# Patient Record
Sex: MALE | Race: WHITE | Employment: UNEMPLOYED | URBAN - METROPOLITAN AREA
[De-identification: names, ages, dates, MRNs, and addresses within clinical notes are randomized per-mention and may not be internally consistent; named-entity substitution may affect disease eponyms.]

---

## 2018-04-23 ENCOUNTER — HOSPITAL ENCOUNTER (OUTPATIENT)
Age: 58
Discharge: HOME | End: 2018-04-25
Payer: COMMERCIAL

## 2018-04-23 LAB
ALBUMIN SERPL-MCNC: 3.9 G/DL (ref 3.5–5.2)
ALP BLD-CCNC: 137 U/L (ref 40–129)
ALT SERPL-CCNC: 42 U/L (ref 0–40)
ANION GAP SERPL CALCULATED.3IONS-SCNC: 16 MMOL/L (ref 7–16)
AST SERPL-CCNC: 31 U/L (ref 0–39)
BASOPHILS ABSOLUTE: 0.08 E9/L (ref 0–0.2)
BASOPHILS RELATIVE PERCENT: 0.8 % (ref 0–2)
BILIRUB SERPL-MCNC: <0.2 MG/DL (ref 0–1.2)
BUN BLDV-MCNC: 9 MG/DL (ref 6–20)
CALCIUM SERPL-MCNC: 9.3 MG/DL (ref 8.6–10.2)
CHLORIDE BLD-SCNC: 95 MMOL/L (ref 98–107)
CHOLESTEROL, TOTAL: 248 MG/DL (ref 0–199)
CO2: 27 MMOL/L (ref 22–29)
CREAT SERPL-MCNC: 0.9 MG/DL (ref 0.7–1.2)
EOSINOPHILS ABSOLUTE: 0.35 E9/L (ref 0.05–0.5)
EOSINOPHILS RELATIVE PERCENT: 3.5 % (ref 0–6)
GFR AFRICAN AMERICAN: >60
GFR NON-AFRICAN AMERICAN: >60 ML/MIN/1.73
GLUCOSE BLD-MCNC: 125 MG/DL (ref 74–109)
HBA1C MFR BLD: 5.9 % (ref 4.8–5.9)
HCT VFR BLD CALC: 50.2 % (ref 37–54)
HDLC SERPL-MCNC: 35 MG/DL
HEMOGLOBIN: 16.9 G/DL (ref 12.5–16.5)
IMMATURE GRANULOCYTES #: 0.04 E9/L
IMMATURE GRANULOCYTES %: 0.4 % (ref 0–5)
LDL CHOLESTEROL CALCULATED: 167 MG/DL (ref 0–99)
LYMPHOCYTES ABSOLUTE: 4.03 E9/L (ref 1.5–4)
LYMPHOCYTES RELATIVE PERCENT: 40.8 % (ref 20–42)
MCH RBC QN AUTO: 36.9 PG (ref 26–35)
MCHC RBC AUTO-ENTMCNC: 33.7 % (ref 32–34.5)
MCV RBC AUTO: 109.6 FL (ref 80–99.9)
MONOCYTES ABSOLUTE: 0.68 E9/L (ref 0.1–0.95)
MONOCYTES RELATIVE PERCENT: 6.9 % (ref 2–12)
NEUTROPHILS ABSOLUTE: 4.7 E9/L (ref 1.8–7.3)
NEUTROPHILS RELATIVE PERCENT: 47.6 % (ref 43–80)
PDW BLD-RTO: 12.5 FL (ref 11.5–15)
PLATELET # BLD: 198 E9/L (ref 130–450)
PMV BLD AUTO: 10.3 FL (ref 7–12)
POTASSIUM SERPL-SCNC: 4.1 MMOL/L (ref 3.5–5)
RBC # BLD: 4.58 E12/L (ref 3.8–5.8)
SODIUM BLD-SCNC: 138 MMOL/L (ref 132–146)
TOTAL PROTEIN: 7.2 G/DL (ref 6.4–8.3)
TRIGL SERPL-MCNC: 232 MG/DL (ref 0–149)
URIC ACID, SERUM: 8 MG/DL (ref 3.4–7)
VLDLC SERPL CALC-MCNC: 46 MG/DL
WBC # BLD: 9.9 E9/L (ref 4.5–11.5)

## 2018-04-23 PROCEDURE — 84550 ASSAY OF BLOOD/URIC ACID: CPT

## 2018-04-23 PROCEDURE — 83036 HEMOGLOBIN GLYCOSYLATED A1C: CPT

## 2018-04-23 PROCEDURE — 85025 COMPLETE CBC W/AUTO DIFF WBC: CPT

## 2018-04-23 PROCEDURE — 80061 LIPID PANEL: CPT

## 2018-04-23 PROCEDURE — 80053 COMPREHEN METABOLIC PANEL: CPT

## 2018-10-12 ENCOUNTER — APPOINTMENT (OUTPATIENT)
Dept: GENERAL RADIOLOGY | Age: 58
End: 2018-10-12
Payer: COMMERCIAL

## 2018-10-12 ENCOUNTER — HOSPITAL ENCOUNTER (EMERGENCY)
Age: 58
Discharge: HOME | End: 2018-10-12
Payer: COMMERCIAL

## 2018-10-12 VITALS
HEART RATE: 108 BPM | WEIGHT: 260 LBS | RESPIRATION RATE: 20 BRPM | SYSTOLIC BLOOD PRESSURE: 183 MMHG | OXYGEN SATURATION: 98 % | DIASTOLIC BLOOD PRESSURE: 93 MMHG | TEMPERATURE: 98.4 F

## 2018-10-12 DIAGNOSIS — J40 BRONCHITIS: Primary | ICD-10-CM

## 2018-10-12 PROCEDURE — 99213 OFFICE O/P EST LOW 20 MIN: CPT

## 2018-10-12 PROCEDURE — 71046 X-RAY EXAM CHEST 2 VIEWS: CPT

## 2018-10-12 PROCEDURE — 94640 AIRWAY INHALATION TREATMENT: CPT

## 2018-10-12 PROCEDURE — 6370000000 HC RX 637 (ALT 250 FOR IP): Performed by: PHYSICIAN ASSISTANT

## 2018-10-12 RX ORDER — IPRATROPIUM BROMIDE AND ALBUTEROL SULFATE 2.5; .5 MG/3ML; MG/3ML
1 SOLUTION RESPIRATORY (INHALATION) ONCE
Status: COMPLETED | OUTPATIENT
Start: 2018-10-12 | End: 2018-10-12

## 2018-10-12 RX ORDER — PREDNISONE 10 MG/1
40 TABLET ORAL DAILY
Qty: 20 TABLET | Refills: 0 | Status: SHIPPED | OUTPATIENT
Start: 2018-10-12 | End: 2018-10-17

## 2018-10-12 RX ORDER — DOXYCYCLINE HYCLATE 100 MG
100 TABLET ORAL 2 TIMES DAILY
Qty: 20 TABLET | Refills: 0 | Status: SHIPPED | OUTPATIENT
Start: 2018-10-12 | End: 2018-10-22

## 2018-10-12 RX ORDER — BENZONATATE 100 MG/1
100 CAPSULE ORAL 3 TIMES DAILY PRN
Qty: 21 CAPSULE | Refills: 0 | Status: SHIPPED | OUTPATIENT
Start: 2018-10-12 | End: 2018-10-19

## 2018-10-12 RX ORDER — ALBUTEROL SULFATE 90 UG/1
2 AEROSOL, METERED RESPIRATORY (INHALATION) EVERY 6 HOURS PRN
Qty: 1 INHALER | Refills: 0 | Status: ON HOLD | OUTPATIENT
Start: 2018-10-12 | End: 2021-03-25 | Stop reason: HOSPADM

## 2018-10-12 RX ADMIN — IPRATROPIUM BROMIDE AND ALBUTEROL SULFATE 1 AMPULE: .5; 3 SOLUTION RESPIRATORY (INHALATION) at 17:07

## 2018-10-12 NOTE — ED PROVIDER NOTES
tenderness. · Respiratory:   Breath sounds: Bilateral normal.  Lung sounds: diminished breath sounds- throughout and wheezing- throughout. No respiratory distress. · CV:  Regular rate and rhythm, normal heart sounds, without pathological murmurs, ectopy, gallops, or rubs. · GI:  Abdomen Soft, nontender, good bowel sounds. No firm or pulsatile mass. · Integument:  Normal turgor. Warm, dry, without visible rash. · Neurological:  Oriented. Motor functions intact. Lab / Imaging Results   (All laboratory and radiology results have been personally reviewed by myself)  Labs:  No results found for this visit on 10/12/18. Imaging: All Radiology results interpreted by Radiologist unless otherwise noted. XR CHEST STANDARD (2 VW)   Final Result   No acute cardiopulmonary disease. ED Course / Medical Decision Making     Medications   ipratropium-albuterol (DUONEB) nebulizer solution 1 ampule (1 ampule Inhalation Given 10/12/18 1707)        Re-examination:  10/12/18       Time: 8504    Patients symptoms are improving. Better air movement overall. Mild scattered wheezing. Updated on results. Consults:   None    Procedures:   none    Medical Decision Making:    No sign of pneumonia on chest x-ray today. Patient is not hypoxic, he has no respiratory distress. He is mildly tachycardic, heart rate of 108 at time of discharge. At last visit 2 years ago heart was seen at that time. He denies any chest pain or shortness of breath. The bronchitis versus COPD exacerbation as he is a heavy smoker. We'll discharge home at this time. Advised that he needs to go to the ER for any worsening symptoms. Otherwise follow-up with any PCP. Plan of Care: Normal progression of disease discussed. All questions answered. Explained the rationale for symptomatic treatment rather than use of an antibiotic.   Instruction provided in the use of fluids, vaporizer, acetaminophen, and other OTC medication for symptom

## 2019-09-03 ENCOUNTER — HOSPITAL ENCOUNTER (OUTPATIENT)
Age: 59
Discharge: HOME | End: 2019-09-05
Payer: COMMERCIAL

## 2019-09-03 LAB
ALBUMIN SERPL-MCNC: 4.2 G/DL (ref 3.5–5.2)
ALP BLD-CCNC: 132 U/L (ref 40–129)
ALT SERPL-CCNC: 30 U/L (ref 0–40)
ANION GAP SERPL CALCULATED.3IONS-SCNC: 17 MMOL/L (ref 7–16)
ANISOCYTOSIS: ABNORMAL
AST SERPL-CCNC: 40 U/L (ref 0–39)
BASOPHILS ABSOLUTE: 0 E9/L (ref 0–0.2)
BASOPHILS RELATIVE PERCENT: 0.7 % (ref 0–2)
BILIRUB SERPL-MCNC: 0.6 MG/DL (ref 0–1.2)
BUN BLDV-MCNC: 17 MG/DL (ref 6–20)
CALCIUM SERPL-MCNC: 9.5 MG/DL (ref 8.6–10.2)
CHLORIDE BLD-SCNC: 96 MMOL/L (ref 98–107)
CHOLESTEROL, TOTAL: 274 MG/DL (ref 0–199)
CO2: 24 MMOL/L (ref 22–29)
CREAT SERPL-MCNC: 0.9 MG/DL (ref 0.7–1.2)
EOSINOPHILS ABSOLUTE: 0.68 E9/L (ref 0.05–0.5)
EOSINOPHILS RELATIVE PERCENT: 6.1 % (ref 0–6)
GFR AFRICAN AMERICAN: >60
GFR NON-AFRICAN AMERICAN: >60 ML/MIN/1.73
GLUCOSE BLD-MCNC: 155 MG/DL (ref 74–99)
HBA1C MFR BLD: 6.5 % (ref 4–5.6)
HCT VFR BLD CALC: 53.1 % (ref 37–54)
HDLC SERPL-MCNC: 50 MG/DL
HEMOGLOBIN: 17.7 G/DL (ref 12.5–16.5)
LDL CHOLESTEROL CALCULATED: 187 MG/DL (ref 0–99)
LYMPHOCYTES ABSOLUTE: 4.22 E9/L (ref 1.5–4)
LYMPHOCYTES RELATIVE PERCENT: 38.3 % (ref 20–42)
MCH RBC QN AUTO: 36.7 PG (ref 26–35)
MCHC RBC AUTO-ENTMCNC: 33.3 % (ref 32–34.5)
MCV RBC AUTO: 110.2 FL (ref 80–99.9)
MICROALBUMIN UR-MCNC: 84.6 MG/L
MONOCYTES ABSOLUTE: 0.44 E9/L (ref 0.1–0.95)
MONOCYTES RELATIVE PERCENT: 4.3 % (ref 2–12)
NEUTROPHILS ABSOLUTE: 5.66 E9/L (ref 1.8–7.3)
NEUTROPHILS RELATIVE PERCENT: 51.3 % (ref 43–80)
PDW BLD-RTO: 12.7 FL (ref 11.5–15)
PLATELET # BLD: 197 E9/L (ref 130–450)
PMV BLD AUTO: 10.6 FL (ref 7–12)
POLYCHROMASIA: ABNORMAL
POTASSIUM SERPL-SCNC: 4.8 MMOL/L (ref 3.5–5)
PROSTATE SPECIFIC ANTIGEN: 0.69 NG/ML (ref 0–4)
RBC # BLD: 4.82 E12/L (ref 3.8–5.8)
SODIUM BLD-SCNC: 137 MMOL/L (ref 132–146)
TOTAL PROTEIN: 7.9 G/DL (ref 6.4–8.3)
TRIGL SERPL-MCNC: 183 MG/DL (ref 0–149)
TSH SERPL DL<=0.05 MIU/L-ACNC: 1.66 UIU/ML (ref 0.27–4.2)
VLDLC SERPL CALC-MCNC: 37 MG/DL
WBC # BLD: 11.1 E9/L (ref 4.5–11.5)

## 2019-09-03 PROCEDURE — 83036 HEMOGLOBIN GLYCOSYLATED A1C: CPT

## 2019-09-03 PROCEDURE — 85025 COMPLETE CBC W/AUTO DIFF WBC: CPT

## 2019-09-03 PROCEDURE — G0103 PSA SCREENING: HCPCS

## 2019-09-03 PROCEDURE — 84443 ASSAY THYROID STIM HORMONE: CPT

## 2019-09-03 PROCEDURE — 82044 UR ALBUMIN SEMIQUANTITATIVE: CPT

## 2019-09-03 PROCEDURE — 80053 COMPREHEN METABOLIC PANEL: CPT

## 2019-09-03 PROCEDURE — 80061 LIPID PANEL: CPT

## 2020-01-17 ENCOUNTER — HOSPITAL ENCOUNTER (EMERGENCY)
Age: 60
Discharge: HOME | End: 2020-01-17
Attending: EMERGENCY MEDICINE
Payer: COMMERCIAL

## 2020-01-17 VITALS
WEIGHT: 260 LBS | RESPIRATION RATE: 16 BRPM | SYSTOLIC BLOOD PRESSURE: 130 MMHG | HEIGHT: 71 IN | HEART RATE: 111 BPM | BODY MASS INDEX: 36.4 KG/M2 | DIASTOLIC BLOOD PRESSURE: 77 MMHG | OXYGEN SATURATION: 95 % | TEMPERATURE: 98.9 F

## 2020-01-17 PROCEDURE — 10061 I&D ABSCESS COMP/MULTIPLE: CPT

## 2020-01-17 PROCEDURE — 99283 EMERGENCY DEPT VISIT LOW MDM: CPT

## 2020-01-17 PROCEDURE — 6370000000 HC RX 637 (ALT 250 FOR IP): Performed by: STUDENT IN AN ORGANIZED HEALTH CARE EDUCATION/TRAINING PROGRAM

## 2020-01-17 RX ORDER — HYDROCODONE BITARTRATE AND ACETAMINOPHEN 5; 325 MG/1; MG/1
1 TABLET ORAL ONCE
Status: COMPLETED | OUTPATIENT
Start: 2020-01-17 | End: 2020-01-17

## 2020-01-17 RX ORDER — DOXYCYCLINE HYCLATE 100 MG
100 TABLET ORAL 2 TIMES DAILY
Qty: 20 TABLET | Refills: 0 | Status: SHIPPED | OUTPATIENT
Start: 2020-01-17 | End: 2020-01-27

## 2020-01-17 RX ADMIN — HYDROCODONE BITARTRATE AND ACETAMINOPHEN 1 TABLET: 5; 325 TABLET ORAL at 14:59

## 2020-01-17 ASSESSMENT — ENCOUNTER SYMPTOMS
COUGH: 0
VOMITING: 0
ABDOMINAL PAIN: 0
COLOR CHANGE: 0
NAUSEA: 0
SHORTNESS OF BREATH: 0
DIARRHEA: 0
WHEEZING: 0
CONSTIPATION: 0

## 2020-01-17 ASSESSMENT — PAIN SCALES - GENERAL: PAINLEVEL_OUTOF10: 10

## 2020-01-17 ASSESSMENT — PAIN DESCRIPTION - FREQUENCY: FREQUENCY: CONTINUOUS

## 2020-01-17 NOTE — ED PROVIDER NOTES
Patient is a 63-year-old male presents the ED for abscess on the back of his neck. Patient states that he has had this for quite some time, states that it progressively got worse over the last week. He states that he had some drainage and noticed that there was a spot with some mild drainage, but was told that if the cyst did not get any worse or any bigger, to just watch it until it got so bad then to have it excised at that time. Patient denies any other focal neurologic deficits, no numbness or tingling. The history is provided by the patient. No  was used. Review of Systems   Constitutional: Negative for chills and fever. Respiratory: Negative for cough, shortness of breath and wheezing. Cardiovascular: Negative for chest pain and palpitations. Gastrointestinal: Negative for abdominal pain, constipation, diarrhea, nausea and vomiting. Genitourinary: Negative for dysuria and hematuria. Musculoskeletal: Negative for neck pain and neck stiffness. Skin: Positive for wound (Posterior neck. ). Negative for color change, pallor and rash. Neurological: Negative for dizziness, syncope, light-headedness, numbness and headaches. Psychiatric/Behavioral: Negative for confusion and decreased concentration. The patient is not nervous/anxious. Physical Exam  Vitals signs and nursing note reviewed. Constitutional:       General: He is not in acute distress. Appearance: He is well-developed. He is not diaphoretic. HENT:      Head: Normocephalic and atraumatic. Right Ear: External ear normal.      Left Ear: External ear normal.      Mouth/Throat:      Pharynx: No oropharyngeal exudate. Eyes:      Pupils: Pupils are equal, round, and reactive to light. Neck:      Musculoskeletal: Normal range of motion. Comments: Large abscess on the posterior portion of the neck, confirmed with ultrasound to have some fluid in that cystic area.   Cardiovascular: Rate and Rhythm: Normal rate and regular rhythm. Heart sounds: Normal heart sounds. No murmur. No friction rub. No gallop. Pulmonary:      Effort: Pulmonary effort is normal. No respiratory distress. Breath sounds: Normal breath sounds. No wheezing or rales. Chest:      Chest wall: No tenderness. Abdominal:      General: Bowel sounds are normal. There is no distension. Palpations: Abdomen is soft. There is no mass. Tenderness: There is no tenderness. There is no guarding or rebound. Hernia: No hernia is present. Musculoskeletal: Normal range of motion. General: No tenderness or deformity. Lymphadenopathy:      Cervical: No cervical adenopathy. Skin:     General: Skin is warm and dry. Capillary Refill: Capillary refill takes less than 2 seconds. Coloration: Skin is not pale. Findings: No erythema or rash. Neurological:      Mental Status: He is alert and oriented to person, place, and time. Cranial Nerves: No cranial nerve deficit. Psychiatric:         Behavior: Behavior normal.         Thought Content: Thought content normal.         Judgment: Judgment normal.          Procedures   Incision and Drainage Procedure Note    Indication: Abscess    Procedure: The patient was positioned appropriately and the skin over the incision site was prepped with alcohol. Local anesthesia was obtained by infiltration using 3.0 cc of 1% Lidocaine without epinephrine. An incision was then made over the greatest area of fluctuance and approximately 10 cc of thick, foul smelling and sabaceous material was expressed. Loculations were broken up using a hemostat but no more material was returned. The drainage cavity was then packed with sterile gauze. The patients tetanus status was up to date and did not require a booster dose. The patient tolerated the procedure well.     Complications: None    Heather Hebert DO  Resident, PGY-3  1/17/2020  3:19 PM        --------------------------------------------- PAST HISTORY ---------------------------------------------  Past Medical History:  has a past medical history of Hyperlipidemia and Hypertension. Past Surgical History:  has no past surgical history on file. Social History:  reports that he has been smoking. He has been smoking about 0.50 packs per day. He does not have any smokeless tobacco history on file. Family History: family history is not on file. The patients home medications have been reviewed. Allergies: Patient has no known allergies. -------------------------------------------------- RESULTS -------------------------------------------------  Labs:  No results found for this visit on 01/17/20. Radiology:  No orders to display       ------------------------- NURSING NOTES AND VITALS REVIEWED ---------------------------  Date / Time Roomed:  1/17/2020  2:09 PM  ED Bed Assignment:  02/02    The nursing notes within the ED encounter and vital signs as below have been reviewed. BP (!) 158/82   Pulse 116   Temp 98.9 °F (37.2 °C)   Resp 20   Ht 5' 11\" (1.803 m)   Wt 260 lb (117.9 kg)   SpO2 96%   BMI 36.26 kg/m²   Oxygen Saturation Interpretation: Normal      ------------------------------------------ PROGRESS NOTES ------------------------------------------  ED Course as of Jan 17 1519 Fri Jan 17, 2020   1429   ATTENDING PROVIDER ATTESTATION:     I have personally performed and/or participated in the history, exam, medical decision making, and procedures and agree with all pertinent clinical information unless otherwise noted. I have also reviewed and agree with the past medical, family and social history unless otherwise noted.     I have discussed this patient in detail with the resident and provided the instruction and education regarding the evidence-based evaluation and treatment of [unfilled]  History: patient presents with sebaceous cyst that has increased

## 2020-01-21 ENCOUNTER — HOSPITAL ENCOUNTER (OUTPATIENT)
Age: 60
Discharge: HOME | End: 2020-01-23
Payer: COMMERCIAL

## 2020-01-21 LAB
ALBUMIN SERPL-MCNC: 4.1 G/DL (ref 3.5–5.2)
ALP BLD-CCNC: 160 U/L (ref 40–129)
ALT SERPL-CCNC: 21 U/L (ref 0–40)
ANION GAP SERPL CALCULATED.3IONS-SCNC: 18 MMOL/L (ref 7–16)
AST SERPL-CCNC: 19 U/L (ref 0–39)
BASOPHILS ABSOLUTE: 0.11 E9/L (ref 0–0.2)
BASOPHILS RELATIVE PERCENT: 0.8 % (ref 0–2)
BILIRUB SERPL-MCNC: 0.3 MG/DL (ref 0–1.2)
BUN BLDV-MCNC: 17 MG/DL (ref 6–20)
CALCIUM SERPL-MCNC: 9.6 MG/DL (ref 8.6–10.2)
CHLORIDE BLD-SCNC: 94 MMOL/L (ref 98–107)
CHOLESTEROL, TOTAL: 233 MG/DL (ref 0–199)
CO2: 23 MMOL/L (ref 22–29)
CREAT SERPL-MCNC: 1 MG/DL (ref 0.7–1.2)
EOSINOPHILS ABSOLUTE: 0.41 E9/L (ref 0.05–0.5)
EOSINOPHILS RELATIVE PERCENT: 2.9 % (ref 0–6)
GFR AFRICAN AMERICAN: >60
GFR NON-AFRICAN AMERICAN: >60 ML/MIN/1.73
GLUCOSE BLD-MCNC: 238 MG/DL (ref 74–99)
HBA1C MFR BLD: 7 % (ref 4–5.6)
HCT VFR BLD CALC: 52.8 % (ref 37–54)
HDLC SERPL-MCNC: 40 MG/DL
HEMOGLOBIN: 17.2 G/DL (ref 12.5–16.5)
IMMATURE GRANULOCYTES #: 0.12 E9/L
IMMATURE GRANULOCYTES %: 0.8 % (ref 0–5)
LDL CHOLESTEROL CALCULATED: 147 MG/DL (ref 0–99)
LYMPHOCYTES ABSOLUTE: 4 E9/L (ref 1.5–4)
LYMPHOCYTES RELATIVE PERCENT: 28.3 % (ref 20–42)
MCH RBC QN AUTO: 35.5 PG (ref 26–35)
MCHC RBC AUTO-ENTMCNC: 32.6 % (ref 32–34.5)
MCV RBC AUTO: 108.9 FL (ref 80–99.9)
MICROALBUMIN UR-MCNC: 35.9 MG/L
MONOCYTES ABSOLUTE: 1.02 E9/L (ref 0.1–0.95)
MONOCYTES RELATIVE PERCENT: 7.2 % (ref 2–12)
NEUTROPHILS ABSOLUTE: 8.48 E9/L (ref 1.8–7.3)
NEUTROPHILS RELATIVE PERCENT: 60 % (ref 43–80)
PDW BLD-RTO: 12.1 FL (ref 11.5–15)
PLATELET # BLD: 225 E9/L (ref 130–450)
PMV BLD AUTO: 10.7 FL (ref 7–12)
POTASSIUM SERPL-SCNC: 4.7 MMOL/L (ref 3.5–5)
RBC # BLD: 4.85 E12/L (ref 3.8–5.8)
SODIUM BLD-SCNC: 135 MMOL/L (ref 132–146)
TOTAL PROTEIN: 7.8 G/DL (ref 6.4–8.3)
TRIGL SERPL-MCNC: 228 MG/DL (ref 0–149)
VLDLC SERPL CALC-MCNC: 46 MG/DL
WBC # BLD: 14.1 E9/L (ref 4.5–11.5)

## 2020-01-21 PROCEDURE — 82044 UR ALBUMIN SEMIQUANTITATIVE: CPT

## 2020-01-21 PROCEDURE — 83036 HEMOGLOBIN GLYCOSYLATED A1C: CPT

## 2020-01-21 PROCEDURE — 85025 COMPLETE CBC W/AUTO DIFF WBC: CPT

## 2020-01-21 PROCEDURE — 80061 LIPID PANEL: CPT

## 2020-01-21 PROCEDURE — 80053 COMPREHEN METABOLIC PANEL: CPT

## 2020-01-27 ENCOUNTER — OFFICE VISIT (OUTPATIENT)
Dept: SURGERY | Age: 60
End: 2020-01-27
Payer: COMMERCIAL

## 2020-01-27 VITALS
BODY MASS INDEX: 36.4 KG/M2 | WEIGHT: 260 LBS | HEART RATE: 110 BPM | SYSTOLIC BLOOD PRESSURE: 161 MMHG | DIASTOLIC BLOOD PRESSURE: 96 MMHG | OXYGEN SATURATION: 93 % | HEIGHT: 71 IN | TEMPERATURE: 97.3 F

## 2020-01-27 PROCEDURE — 99204 OFFICE O/P NEW MOD 45 MIN: CPT | Performed by: SURGERY

## 2020-01-27 RX ORDER — OMEPRAZOLE 40 MG/1
40 CAPSULE, DELAYED RELEASE ORAL DAILY PRN
COMMUNITY
Start: 2015-04-28 | End: 2022-07-10 | Stop reason: ALTCHOICE

## 2020-01-27 RX ORDER — AMOXICILLIN AND CLAVULANATE POTASSIUM 875; 125 MG/1; MG/1
TABLET, FILM COATED ORAL
COMMUNITY
Start: 2020-01-21 | End: 2020-02-04 | Stop reason: ALTCHOICE

## 2020-01-27 RX ORDER — SITAGLIPTIN 50 MG/1
50 TABLET, FILM COATED ORAL DAILY
COMMUNITY
Start: 2020-01-21 | End: 2022-07-10 | Stop reason: ALTCHOICE

## 2020-01-27 RX ORDER — ZOLPIDEM TARTRATE 5 MG/1
TABLET ORAL
Status: ON HOLD | COMMUNITY
Start: 2018-10-23 | End: 2021-03-25 | Stop reason: HOSPADM

## 2020-01-27 RX ORDER — UMECLIDINIUM BROMIDE AND VILANTEROL TRIFENATATE 62.5; 25 UG/1; UG/1
1 POWDER RESPIRATORY (INHALATION) DAILY
COMMUNITY
Start: 2019-12-11 | End: 2022-07-10 | Stop reason: ALTCHOICE

## 2020-01-27 RX ORDER — PRAVASTATIN SODIUM 20 MG
20 TABLET ORAL DAILY
COMMUNITY
Start: 2020-01-10 | End: 2022-07-10 | Stop reason: ALTCHOICE

## 2020-01-27 RX ORDER — AMLODIPINE BESYLATE AND BENAZEPRIL HYDROCHLORIDE 10; 40 MG/1; MG/1
CAPSULE ORAL
Status: ON HOLD | COMMUNITY
Start: 2018-10-23 | End: 2021-03-25 | Stop reason: HOSPADM

## 2020-01-27 RX ORDER — METOPROLOL SUCCINATE 50 MG/1
TABLET, EXTENDED RELEASE ORAL
Status: ON HOLD | COMMUNITY
Start: 2019-09-03 | End: 2021-03-25 | Stop reason: HOSPADM

## 2020-01-27 RX ORDER — ALLOPURINOL 300 MG/1
300 TABLET ORAL DAILY
COMMUNITY
Start: 2018-04-23 | End: 2022-07-10 | Stop reason: ALTCHOICE

## 2020-01-27 SDOH — HEALTH STABILITY: MENTAL HEALTH: HOW OFTEN DO YOU HAVE A DRINK CONTAINING ALCOHOL?: 4 OR MORE TIMES A WEEK

## 2020-01-27 NOTE — PROGRESS NOTES
General Surgery History and Physical    Patient's Name/Date of Birth: Heather Cerda / 1960    Date: January 27, 2020     Surgeon: Nii Mireles M.D.    PCP: Ruth Levine MD     Chief Complaint: soft tissue neoplasm of posterior neck    HPI:   Heather Cerda is a 61 y.o. male who presents for evaluation of soft tissue neoplasm of posterior neck. It has become more painful and is enlarging. Past Medical History:   Diagnosis Date    Hyperlipidemia     Hypertension        No past surgical history on file. Current Outpatient Medications   Medication Sig Dispense Refill    allopurinol (ZYLOPRIM) 300 MG tablet Take by mouth      amLODIPine-benazepril (LOTREL) 10-40 MG per capsule Take by mouth      amoxicillin-clavulanate (AUGMENTIN) 875-125 MG per tablet Take by mouth      metFORMIN (GLUCOPHAGE) 500 MG tablet Take by mouth      metoprolol succinate (TOPROL XL) 50 MG extended release tablet Take by mouth      omeprazole (PRILOSEC) 40 MG delayed release capsule Take by mouth      JANUVIA 50 MG tablet TK 1 T PO QD      pravastatin (PRAVACHOL) 20 MG tablet TK 1 T PO QD      zolpidem (AMBIEN) 5 MG tablet       ANORO ELLIPTA 62.5-25 MCG/INH AEPB inhaler INL 1 PUFF PO D      doxycycline hyclate (VIBRA-TABS) 100 MG tablet Take 1 tablet by mouth 2 times daily for 10 days 20 tablet 0    albuterol sulfate HFA (VENTOLIN HFA) 108 (90 Base) MCG/ACT inhaler Inhale 2 puffs into the lungs every 6 hours as needed for Wheezing 1 Inhaler 0    Amlodipine-Valsartan-HCTZ (EXFORGE HCT PO) Take  by mouth.  ibuprofen (IBU) 800 MG tablet Take 1 tablet by mouth every 8 hours as needed for Pain for 10 days. 30 tablet 0    ibuprofen (IBU) 800 MG tablet Take 1 tablet by mouth every 8 hours as needed for Pain for 7 days. 21 tablet 0     No current facility-administered medications for this visit.         No Known Allergies    The patient has a family history that is negative for severe cardiovascular or

## 2020-01-28 ENCOUNTER — TELEPHONE (OUTPATIENT)
Dept: SURGERY | Age: 60
End: 2020-01-28

## 2020-01-29 ENCOUNTER — PREP FOR PROCEDURE (OUTPATIENT)
Dept: SURGERY | Age: 60
End: 2020-01-29

## 2020-01-29 RX ORDER — SODIUM CHLORIDE 0.9 % (FLUSH) 0.9 %
10 SYRINGE (ML) INJECTION PRN
Status: CANCELLED | OUTPATIENT
Start: 2020-01-29

## 2020-01-29 RX ORDER — SODIUM CHLORIDE 0.9 % (FLUSH) 0.9 %
10 SYRINGE (ML) INJECTION EVERY 12 HOURS SCHEDULED
Status: CANCELLED | OUTPATIENT
Start: 2020-01-29

## 2020-01-30 ENCOUNTER — TELEPHONE (OUTPATIENT)
Dept: SURGERY | Age: 60
End: 2020-01-30

## 2020-02-05 ENCOUNTER — HOSPITAL ENCOUNTER (OUTPATIENT)
Age: 60
Setting detail: OUTPATIENT SURGERY
Discharge: HOME | End: 2020-02-05
Attending: SURGERY | Admitting: SURGERY
Payer: COMMERCIAL

## 2020-02-05 ENCOUNTER — ANESTHESIA EVENT (OUTPATIENT)
Dept: OPERATING ROOM | Age: 60
End: 2020-02-05
Payer: COMMERCIAL

## 2020-02-05 ENCOUNTER — ANESTHESIA (OUTPATIENT)
Dept: OPERATING ROOM | Age: 60
End: 2020-02-05
Payer: COMMERCIAL

## 2020-02-05 VITALS
DIASTOLIC BLOOD PRESSURE: 79 MMHG | SYSTOLIC BLOOD PRESSURE: 122 MMHG | OXYGEN SATURATION: 94 % | RESPIRATION RATE: 18 BRPM | HEIGHT: 71 IN | BODY MASS INDEX: 38.5 KG/M2 | TEMPERATURE: 97.8 F | WEIGHT: 275 LBS | HEART RATE: 99 BPM

## 2020-02-05 VITALS
OXYGEN SATURATION: 93 % | SYSTOLIC BLOOD PRESSURE: 99 MMHG | RESPIRATION RATE: 17 BRPM | DIASTOLIC BLOOD PRESSURE: 57 MMHG

## 2020-02-05 LAB
BASOPHILS ABSOLUTE: 0.07 E9/L (ref 0–0.2)
BASOPHILS RELATIVE PERCENT: 0.4 % (ref 0–2)
EOSINOPHILS ABSOLUTE: 0.27 E9/L (ref 0.05–0.5)
EOSINOPHILS RELATIVE PERCENT: 1.7 % (ref 0–6)
HCT VFR BLD CALC: 48.9 % (ref 37–54)
HEMOGLOBIN: 17.3 G/DL (ref 12.5–16.5)
IMMATURE GRANULOCYTES #: 0.11 E9/L
IMMATURE GRANULOCYTES %: 0.7 % (ref 0–5)
LYMPHOCYTES ABSOLUTE: 4.77 E9/L (ref 1.5–4)
LYMPHOCYTES RELATIVE PERCENT: 30.1 % (ref 20–42)
MCH RBC QN AUTO: 36.6 PG (ref 26–35)
MCHC RBC AUTO-ENTMCNC: 35.4 % (ref 32–34.5)
MCV RBC AUTO: 103.4 FL (ref 80–99.9)
METER GLUCOSE: 168 MG/DL (ref 74–99)
MONOCYTES ABSOLUTE: 1.06 E9/L (ref 0.1–0.95)
MONOCYTES RELATIVE PERCENT: 6.7 % (ref 2–12)
NEUTROPHILS ABSOLUTE: 9.56 E9/L (ref 1.8–7.3)
NEUTROPHILS RELATIVE PERCENT: 60.4 % (ref 43–80)
PDW BLD-RTO: 12.2 FL (ref 11.5–15)
PLATELET # BLD: 106 E9/L (ref 130–450)
PMV BLD AUTO: 10.9 FL (ref 7–12)
RBC # BLD: 4.73 E12/L (ref 3.8–5.8)
WBC # BLD: 15.8 E9/L (ref 4.5–11.5)

## 2020-02-05 PROCEDURE — 85025 COMPLETE CBC W/AUTO DIFF WBC: CPT

## 2020-02-05 PROCEDURE — 3700000000 HC ANESTHESIA ATTENDED CARE: Performed by: SURGERY

## 2020-02-05 PROCEDURE — 36415 COLL VENOUS BLD VENIPUNCTURE: CPT

## 2020-02-05 PROCEDURE — 88304 TISSUE EXAM BY PATHOLOGIST: CPT

## 2020-02-05 PROCEDURE — 7100000010 HC PHASE II RECOVERY - FIRST 15 MIN: Performed by: SURGERY

## 2020-02-05 PROCEDURE — 2580000003 HC RX 258: Performed by: ANESTHESIOLOGY

## 2020-02-05 PROCEDURE — 6360000002 HC RX W HCPCS: Performed by: NURSE ANESTHETIST, CERTIFIED REGISTERED

## 2020-02-05 PROCEDURE — 3600000012 HC SURGERY LEVEL 2 ADDTL 15MIN: Performed by: SURGERY

## 2020-02-05 PROCEDURE — 12042 INTMD RPR N-HF/GENIT2.6-7.5: CPT | Performed by: SURGERY

## 2020-02-05 PROCEDURE — 3700000001 HC ADD 15 MINUTES (ANESTHESIA): Performed by: SURGERY

## 2020-02-05 PROCEDURE — 2580000003 HC RX 258: Performed by: SURGERY

## 2020-02-05 PROCEDURE — 2500000003 HC RX 250 WO HCPCS: Performed by: SURGERY

## 2020-02-05 PROCEDURE — 99024 POSTOP FOLLOW-UP VISIT: CPT | Performed by: SURGERY

## 2020-02-05 PROCEDURE — 2709999900 HC NON-CHARGEABLE SUPPLY: Performed by: SURGERY

## 2020-02-05 PROCEDURE — 6360000002 HC RX W HCPCS: Performed by: SURGERY

## 2020-02-05 PROCEDURE — 3600000002 HC SURGERY LEVEL 2 BASE: Performed by: SURGERY

## 2020-02-05 PROCEDURE — 11424 EXC H-F-NK-SP B9+MARG 3.1-4: CPT | Performed by: SURGERY

## 2020-02-05 PROCEDURE — 82962 GLUCOSE BLOOD TEST: CPT

## 2020-02-05 PROCEDURE — 7100000011 HC PHASE II RECOVERY - ADDTL 15 MIN: Performed by: SURGERY

## 2020-02-05 RX ORDER — CEFAZOLIN SODIUM 2 G/50ML
2 SOLUTION INTRAVENOUS
Status: COMPLETED | OUTPATIENT
Start: 2020-02-05 | End: 2020-02-05

## 2020-02-05 RX ORDER — HYDROCODONE BITARTRATE AND ACETAMINOPHEN 5; 325 MG/1; MG/1
1 TABLET ORAL EVERY 6 HOURS PRN
Qty: 5 TABLET | Refills: 0 | Status: SHIPPED | OUTPATIENT
Start: 2020-02-05 | End: 2020-02-08

## 2020-02-05 RX ORDER — BUPIVACAINE HYDROCHLORIDE AND EPINEPHRINE 2.5; 5 MG/ML; UG/ML
INJECTION, SOLUTION EPIDURAL; INFILTRATION; INTRACAUDAL; PERINEURAL PRN
Status: DISCONTINUED | OUTPATIENT
Start: 2020-02-05 | End: 2020-02-05 | Stop reason: ALTCHOICE

## 2020-02-05 RX ORDER — SODIUM CHLORIDE 9 MG/ML
INJECTION, SOLUTION INTRAVENOUS CONTINUOUS
Status: DISCONTINUED | OUTPATIENT
Start: 2020-02-05 | End: 2020-02-05 | Stop reason: HOSPADM

## 2020-02-05 RX ORDER — PROPOFOL 10 MG/ML
INJECTION, EMULSION INTRAVENOUS CONTINUOUS PRN
Status: DISCONTINUED | OUTPATIENT
Start: 2020-02-05 | End: 2020-02-05 | Stop reason: SDUPTHER

## 2020-02-05 RX ORDER — MIDAZOLAM HYDROCHLORIDE 1 MG/ML
INJECTION INTRAMUSCULAR; INTRAVENOUS PRN
Status: DISCONTINUED | OUTPATIENT
Start: 2020-02-05 | End: 2020-02-05 | Stop reason: SDUPTHER

## 2020-02-05 RX ORDER — SODIUM CHLORIDE 0.9 % (FLUSH) 0.9 %
10 SYRINGE (ML) INJECTION PRN
Status: DISCONTINUED | OUTPATIENT
Start: 2020-02-05 | End: 2020-02-05 | Stop reason: HOSPADM

## 2020-02-05 RX ORDER — FENTANYL CITRATE 50 UG/ML
INJECTION, SOLUTION INTRAMUSCULAR; INTRAVENOUS PRN
Status: DISCONTINUED | OUTPATIENT
Start: 2020-02-05 | End: 2020-02-05 | Stop reason: SDUPTHER

## 2020-02-05 RX ORDER — SODIUM CHLORIDE 0.9 % (FLUSH) 0.9 %
10 SYRINGE (ML) INJECTION EVERY 12 HOURS SCHEDULED
Status: DISCONTINUED | OUTPATIENT
Start: 2020-02-05 | End: 2020-02-05 | Stop reason: HOSPADM

## 2020-02-05 RX ADMIN — SODIUM CHLORIDE, PRESERVATIVE FREE 10 ML: 5 INJECTION INTRAVENOUS at 08:54

## 2020-02-05 RX ADMIN — CEFAZOLIN SODIUM 2 G: 2 SOLUTION INTRAVENOUS at 07:40

## 2020-02-05 RX ADMIN — FENTANYL CITRATE 100 MCG: 50 INJECTION, SOLUTION INTRAMUSCULAR; INTRAVENOUS at 07:33

## 2020-02-05 RX ADMIN — SODIUM CHLORIDE: 9 INJECTION, SOLUTION INTRAVENOUS at 06:56

## 2020-02-05 RX ADMIN — FENTANYL CITRATE 100 MCG: 50 INJECTION, SOLUTION INTRAMUSCULAR; INTRAVENOUS at 07:58

## 2020-02-05 RX ADMIN — MIDAZOLAM 2 MG: 1 INJECTION INTRAMUSCULAR; INTRAVENOUS at 07:33

## 2020-02-05 RX ADMIN — PROPOFOL 120 MCG/KG/MIN: 10 INJECTION, EMULSION INTRAVENOUS at 07:37

## 2020-02-05 ASSESSMENT — PULMONARY FUNCTION TESTS
PIF_VALUE: 1
PIF_VALUE: 0
PIF_VALUE: 0
PIF_VALUE: 1
PIF_VALUE: 1
PIF_VALUE: 0
PIF_VALUE: 0
PIF_VALUE: 1
PIF_VALUE: 0
PIF_VALUE: 1
PIF_VALUE: 0
PIF_VALUE: 1
PIF_VALUE: 1
PIF_VALUE: 0
PIF_VALUE: 1
PIF_VALUE: 0
PIF_VALUE: 1
PIF_VALUE: 0
PIF_VALUE: 0
PIF_VALUE: 1
PIF_VALUE: 0
PIF_VALUE: 1
PIF_VALUE: 0
PIF_VALUE: 0
PIF_VALUE: 1
PIF_VALUE: 0
PIF_VALUE: 1
PIF_VALUE: 0

## 2020-02-05 ASSESSMENT — PAIN DESCRIPTION - LOCATION: LOCATION: NECK

## 2020-02-05 ASSESSMENT — PAIN DESCRIPTION - DESCRIPTORS: DESCRIPTORS: SORE

## 2020-02-05 ASSESSMENT — LIFESTYLE VARIABLES: SMOKING_STATUS: 1

## 2020-02-05 ASSESSMENT — PAIN - FUNCTIONAL ASSESSMENT: PAIN_FUNCTIONAL_ASSESSMENT: 0-10

## 2020-02-05 ASSESSMENT — PAIN SCALES - GENERAL: PAINLEVEL_OUTOF10: 5

## 2020-02-05 ASSESSMENT — PAIN DESCRIPTION - PAIN TYPE: TYPE: SURGICAL PAIN

## 2020-02-05 NOTE — H&P
General Surgery History and Physical     Patient's Name/Date of Birth: Barb Gamboa / 1960     Date: 2/5/20      Surgeon: Pily Bautista M.D.     PCP: Ken Lu MD     Chief Complaint: soft tissue neoplasm of posterior neck     HPI:   Barb Gamboa is a 61 y.o. male who presents for evaluation of soft tissue neoplasm of posterior neck. It has become more painful and is enlarging.         Past Medical History        Past Medical History:   Diagnosis Date    Hyperlipidemia      Hypertension              Past Surgical History   No past surgical history on file.        Current Facility-Administered Medications          Current Outpatient Medications   Medication Sig Dispense Refill    allopurinol (ZYLOPRIM) 300 MG tablet Take by mouth        amLODIPine-benazepril (LOTREL) 10-40 MG per capsule Take by mouth        amoxicillin-clavulanate (AUGMENTIN) 875-125 MG per tablet Take by mouth        metFORMIN (GLUCOPHAGE) 500 MG tablet Take by mouth        metoprolol succinate (TOPROL XL) 50 MG extended release tablet Take by mouth        omeprazole (PRILOSEC) 40 MG delayed release capsule Take by mouth        JANUVIA 50 MG tablet TK 1 T PO QD        pravastatin (PRAVACHOL) 20 MG tablet TK 1 T PO QD        zolpidem (AMBIEN) 5 MG tablet          ANORO ELLIPTA 62.5-25 MCG/INH AEPB inhaler INL 1 PUFF PO D        doxycycline hyclate (VIBRA-TABS) 100 MG tablet Take 1 tablet by mouth 2 times daily for 10 days 20 tablet 0    albuterol sulfate HFA (VENTOLIN HFA) 108 (90 Base) MCG/ACT inhaler Inhale 2 puffs into the lungs every 6 hours as needed for Wheezing 1 Inhaler 0    Amlodipine-Valsartan-HCTZ (EXFORGE HCT PO) Take  by mouth.          ibuprofen (IBU) 800 MG tablet Take 1 tablet by mouth every 8 hours as needed for Pain for 10 days. 30 tablet 0    ibuprofen (IBU) 800 MG tablet Take 1 tablet by mouth every 8 hours as needed for Pain for 7 days.  21 tablet 0      No current facility-administered congestion  Hematological and Lymphatic ROS: negative for - blood clots, blood transfusions, bruising or fatigue  Endocrine ROS: negative for - malaise/lethargy, mood swings, palpitations or polydipsia/polyuria  Breast ROS: negative for - new or changing breast lumps or nipple changes  Respiratory ROS: negative for - sputum changes, stridor, tachypnea or wheezing  Cardiovascular ROS: negative for - irregular heartbeat, loss of consciousness, murmur or orthopnea  Gastrointestinal ROS: negative for - constipation, diarrhea, gas/bloating, heartburn or hematemesis  Genito-Urinary ROS: negative for -  genital discharge, genital ulcers or hematuria  Musculoskeletal ROS: negative for - gait disturbance, muscle pain or muscular weakness        Physical exam:   BP (!) 140/67   Pulse 100   Temp 96.5 °F (35.8 °C) (Temporal)   Resp 20   Ht 5' 11\" (1.803 m)   Wt 275 lb (124.7 kg)   SpO2 96%   BMI 38.35 kg/m²     General appearance:  NAD  Head: NCAT, PERRLA, EOMI, red conjunctiva  Neck: supple, no masses  Lungs: CTAB, equal chest rise bilateral  Heart: Reg rate  Abdomen: soft, nontender, nondistended  Skin; soft tissue neoplasm of posterior neck that is soft rubbery and mobile. Gu: no cva tenderness  Extremities: extremities normal, atraumatic, no cyanosis or edema  Pyschosocial: normal affect, no signs of depression        Assessment:  61 y.o. male with soft tissue neoplasm of posterior neck     Plan: To OR for excision Discussed the risk, benefits and alternatives of surgery including wound infections, bleeding, scar  and the risks of  anesthetic including MI, CVA, sudden death or reactions to anesthetic medications. The patient understands the risks and alternatives.  All questions were answered to the patient's satisfaction and they freely signed the consent.  Jayne Thorne MD

## 2020-02-05 NOTE — OP NOTE
DATE OF PROCEDURE: 2/5/2020   SURGEON: GEORGIE Luis. PREOPERATIVE DIAGNOSIS: painful neck cyst, 3.5 cm in size. POSTOPERATIVE DIAGNOSIS: same. OPERATION: Excision of painful neck cyst 3.5 cm in sizet . ANESTHESIA: LMAC and local.   ESTIMATED BLOOD LOSS: Minimal.   COMPLICATIONS: None. FLUIDS: Crystalloid. DISPOSITION: Discharged home. SPECIMEN: cyst.   PROCEDURE: The patient was brought into the operative suite and was placed   under [facility] anesthesia; was infused with local anesthetic after being prepped   and draped in a normal sterile condition. Once this was done, approximately a 0.5x 1.5-cm incision was made in the neck. Once this was done, the incision was carried down through the   dermis with electrocautery. This was then delivered and sent for   specimen. The wound was sterilely irrigated, and electrocautery was used to obtain   hemostasis. Once this was done, deep dermal stitches were placed with a 3-0   Vicryl suture, and a running 4-0 Vicryl suture was used in a subcuticular   fashion to approximate the skin. Finally, Dermabond was placed, and the   patient was woken up in stable condition and taken to PACU.

## 2020-02-05 NOTE — PROGRESS NOTES
Ambulated in the hallway, steady gait. Requesting discharge, met criteria. Instructions reviewed including MyChart and escribed med.

## 2020-02-05 NOTE — ANESTHESIA PRE PROCEDURE
Department of Anesthesiology  Preprocedure Note       Name:  Severo Pickett   Age:  61 y.o.  :  1960                                          MRN:  [id number]         Date:  2020      Surgeon: Alie Martinez):  Nikita Roger MD    Procedure: EXCISION POSTERIOR SOFT TISSUE NEOPLASM NECK (CPT 85336) (N/A )    Medications prior to admission:   Prior to Admission medications    Medication Sig Start Date End Date Taking? Authorizing Provider   allopurinol (ZYLOPRIM) 300 MG tablet Take by mouth 18   Historical Provider, MD   amLODIPine-benazepril (LOTREL) 10-40 MG per capsule Take by mouth 10/23/18   Historical Provider, MD   metFORMIN (GLUCOPHAGE) 500 MG tablet Take by mouth 2 times daily (with meals)  18   Historical Provider, MD   metoprolol succinate (TOPROL XL) 50 MG extended release tablet Take by mouth 9/3/19   Historical Provider, MD   omeprazole (PRILOSEC) 40 MG delayed release capsule Take by mouth 4/28/15   Historical Provider, MD   JANUVIA 50 MG tablet TK 1 T PO QD 20   Historical Provider, MD   pravastatin (PRAVACHOL) 20 MG tablet TK 1 T PO QD 1/10/20   Historical Provider, MD   zolpidem (AMBIEN) 5 MG tablet  10/23/18   Historical Provider, MD Sandra Greene ELLIPTA 62.5-25 MCG/INH AEPB inhaler INL 1 PUFF PO D 19   Historical Provider, MD   albuterol sulfate HFA (VENTOLIN HFA) 108 (90 Base) MCG/ACT inhaler Inhale 2 puffs into the lungs every 6 hours as needed for Wheezing 10/12/18   BELGICA Roa   ibuprofen (IBU) 800 MG tablet Take 1 tablet by mouth every 8 hours as needed for Pain for 10 days. 13  Duglas Howell MD   ibuprofen (IBU) 800 MG tablet Take 1 tablet by mouth every 8 hours as needed for Pain for 7 days.  12  Salud Whipple DO       Current medications:    Current Facility-Administered Medications   Medication Dose Route Frequency Provider Last Rate Last Dose    0.9 % sodium chloride infusion   Intravenous Continuous Vincenzo Faye MD

## 2020-02-13 ENCOUNTER — OFFICE VISIT (OUTPATIENT)
Dept: SURGERY | Age: 60
End: 2020-02-13

## 2020-02-13 VITALS
OXYGEN SATURATION: 95 % | TEMPERATURE: 98.1 F | BODY MASS INDEX: 37.1 KG/M2 | WEIGHT: 265 LBS | DIASTOLIC BLOOD PRESSURE: 85 MMHG | SYSTOLIC BLOOD PRESSURE: 157 MMHG | HEIGHT: 71 IN | HEART RATE: 105 BPM

## 2020-02-13 PROCEDURE — 99024 POSTOP FOLLOW-UP VISIT: CPT | Performed by: SURGERY

## 2020-02-13 NOTE — PROGRESS NOTES
Surgery Progress Note            Chief complaint:   Patient Active Problem List   Diagnosis    Epidermal cyst of neck       S: doing well    O:   Vitals:    02/13/20 0952   BP: (!) 157/85   Pulse: 105   Temp: 98.1 °F (36.7 °C)   SpO2: 95%     No intake or output data in the 24 hours ending 02/13/20 1014        Labs:  No results for input(s): WBC, HGB, HCT in the last 72 hours. Invalid input(s): PLR  Lab Results   Component Value Date    CREATININE 1.0 01/21/2020    BUN 17 01/21/2020     01/21/2020    K 4.7 01/21/2020    CL 94 (L) 01/21/2020    CO2 23 01/21/2020     No results for input(s): LIPASE, AMYLASE in the last 72 hours. Physical exam:   BP (!) 157/85   Pulse 105   Temp 98.1 °F (36.7 °C) (Oral)   Ht 5' 11\" (1.803 m)   Wt 265 lb (120.2 kg)   SpO2 95%   BMI 36.96 kg/m²   General appearance: NAD  Head: NCAT  Neck: supple, no masses  Lungs: equal chest rise bilateral  Heart: S1S2 present  Abdomen: soft, nontender, nondistended  Skin; no new lesions, incisions clean and intact  Gu: no cva tenderness  Extremities: extremities normal, atraumatic, no cyanosis or edema    A:  Post op neck cyst excision    P: follow up as needed.      Maria T No MD  2/13/2020

## 2020-02-20 ENCOUNTER — OFFICE VISIT (OUTPATIENT)
Dept: SURGERY | Age: 60
End: 2020-02-20

## 2020-02-20 VITALS
BODY MASS INDEX: 36.4 KG/M2 | HEIGHT: 71 IN | SYSTOLIC BLOOD PRESSURE: 134 MMHG | DIASTOLIC BLOOD PRESSURE: 75 MMHG | TEMPERATURE: 98 F | HEART RATE: 112 BPM | WEIGHT: 260 LBS

## 2020-02-20 PROCEDURE — 99024 POSTOP FOLLOW-UP VISIT: CPT | Performed by: SURGERY

## 2021-03-20 ENCOUNTER — HOSPITAL ENCOUNTER (INPATIENT)
Age: 61
LOS: 5 days | Discharge: HOME | DRG: 897 | End: 2021-03-25
Attending: EMERGENCY MEDICINE | Admitting: INTERNAL MEDICINE
Payer: COMMERCIAL

## 2021-03-20 ENCOUNTER — APPOINTMENT (OUTPATIENT)
Dept: CT IMAGING | Age: 61
DRG: 897 | End: 2021-03-20
Payer: COMMERCIAL

## 2021-03-20 DIAGNOSIS — I49.8 OTHER CARDIAC ARRHYTHMIA: ICD-10-CM

## 2021-03-20 DIAGNOSIS — E83.42 HYPOMAGNESEMIA: Primary | ICD-10-CM

## 2021-03-20 DIAGNOSIS — S22.42XA CLOSED FRACTURE OF MULTIPLE RIBS OF LEFT SIDE, INITIAL ENCOUNTER: ICD-10-CM

## 2021-03-20 DIAGNOSIS — S40.019A CONTUSION, SHOULDER AND UPPER ARM, MULTIPLE SITES, UNSPECIFIED LATERALITY, INITIAL ENCOUNTER: ICD-10-CM

## 2021-03-20 DIAGNOSIS — R55 SYNCOPE AND COLLAPSE: ICD-10-CM

## 2021-03-20 DIAGNOSIS — S40.029A CONTUSION, SHOULDER AND UPPER ARM, MULTIPLE SITES, UNSPECIFIED LATERALITY, INITIAL ENCOUNTER: ICD-10-CM

## 2021-03-20 DIAGNOSIS — J44.1 COPD EXACERBATION (HCC): ICD-10-CM

## 2021-03-20 DIAGNOSIS — S09.90XA CLOSED HEAD INJURY, INITIAL ENCOUNTER: ICD-10-CM

## 2021-03-20 PROBLEM — I49.9 CARDIAC ARRHYTHMIA: Status: ACTIVE | Noted: 2021-03-20

## 2021-03-20 LAB
ANION GAP SERPL CALCULATED.3IONS-SCNC: 20 MMOL/L (ref 7–16)
APTT: 26 SEC (ref 24.5–35.1)
B.E.: 1.3 MMOL/L (ref -3–3)
BUN BLDV-MCNC: 25 MG/DL (ref 8–23)
CALCIUM SERPL-MCNC: 7.4 MG/DL (ref 8.6–10.2)
CHLORIDE BLD-SCNC: 87 MMOL/L (ref 98–107)
CO2: 26 MMOL/L (ref 22–29)
COHB: 0.3 % (ref 0–1.5)
CREAT SERPL-MCNC: 1.2 MG/DL (ref 0.7–1.2)
CRITICAL: ABNORMAL
DATE ANALYZED: ABNORMAL
DATE OF COLLECTION: ABNORMAL
GFR AFRICAN AMERICAN: >60
GFR NON-AFRICAN AMERICAN: >60 ML/MIN/1.73
GLUCOSE BLD-MCNC: 162 MG/DL (ref 74–99)
HCO3: 20.7 MMOL/L (ref 22–26)
HCT VFR BLD CALC: 31.8 % (ref 37–54)
HEMOGLOBIN: 11.1 G/DL (ref 12.5–16.5)
HHB: 2.7 % (ref 0–5)
INR BLD: 1.2
LAB: ABNORMAL
Lab: ABNORMAL
MAGNESIUM: 1.1 MG/DL (ref 1.6–2.6)
MCH RBC QN AUTO: 37.6 PG (ref 26–35)
MCHC RBC AUTO-ENTMCNC: 34.9 % (ref 32–34.5)
MCV RBC AUTO: 107.8 FL (ref 80–99.9)
METHB: 0.3 % (ref 0–1.5)
MODE: ABNORMAL
O2 CONTENT: 16.4 ML/DL
O2 SATURATION: 97.3 % (ref 92–98.5)
O2HB: 96.7 % (ref 94–97)
OPERATOR ID: 274
PATIENT TEMP: 37
PCO2: 20.2 MMHG (ref 35–45)
PDW BLD-RTO: 14.1 FL (ref 11.5–15)
PH BLOOD GAS: 7.63 (ref 7.35–7.45)
PLATELET # BLD: 410 E9/L (ref 130–450)
PMV BLD AUTO: 10 FL (ref 7–12)
PO2: 91.3 MMHG (ref 75–100)
POTASSIUM SERPL-SCNC: 3.5 MMOL/L (ref 3.5–5)
PRO-BNP: 1540 PG/ML (ref 0–125)
PROTHROMBIN TIME: 14.1 SEC (ref 9.3–12.4)
RBC # BLD: 2.95 E12/L (ref 3.8–5.8)
REASON FOR REJECTION: NORMAL
REJECTED TEST: NORMAL
SARS-COV-2, NAAT: NOT DETECTED
SODIUM BLD-SCNC: 133 MMOL/L (ref 132–146)
SOURCE, BLOOD GAS: ABNORMAL
THB: 12 G/DL (ref 11.5–16.5)
TIME ANALYZED: 1617
TROPONIN: 0.02 NG/ML (ref 0–0.03)
WBC # BLD: 17.1 E9/L (ref 4.5–11.5)

## 2021-03-20 PROCEDURE — 83880 ASSAY OF NATRIURETIC PEPTIDE: CPT

## 2021-03-20 PROCEDURE — 6370000000 HC RX 637 (ALT 250 FOR IP): Performed by: INTERNAL MEDICINE

## 2021-03-20 PROCEDURE — 84484 ASSAY OF TROPONIN QUANT: CPT

## 2021-03-20 PROCEDURE — 36415 COLL VENOUS BLD VENIPUNCTURE: CPT

## 2021-03-20 PROCEDURE — 80048 BASIC METABOLIC PNL TOTAL CA: CPT

## 2021-03-20 PROCEDURE — 94640 AIRWAY INHALATION TREATMENT: CPT

## 2021-03-20 PROCEDURE — 85610 PROTHROMBIN TIME: CPT

## 2021-03-20 PROCEDURE — 93005 ELECTROCARDIOGRAM TRACING: CPT | Performed by: EMERGENCY MEDICINE

## 2021-03-20 PROCEDURE — 96365 THER/PROPH/DIAG IV INF INIT: CPT

## 2021-03-20 PROCEDURE — 87635 SARS-COV-2 COVID-19 AMP PRB: CPT

## 2021-03-20 PROCEDURE — 96375 TX/PRO/DX INJ NEW DRUG ADDON: CPT

## 2021-03-20 PROCEDURE — 99285 EMERGENCY DEPT VISIT HI MDM: CPT

## 2021-03-20 PROCEDURE — 2580000003 HC RX 258: Performed by: PHYSICIAN ASSISTANT

## 2021-03-20 PROCEDURE — 6370000000 HC RX 637 (ALT 250 FOR IP): Performed by: PHYSICIAN ASSISTANT

## 2021-03-20 PROCEDURE — 96366 THER/PROPH/DIAG IV INF ADDON: CPT

## 2021-03-20 PROCEDURE — 85730 THROMBOPLASTIN TIME PARTIAL: CPT

## 2021-03-20 PROCEDURE — 82805 BLOOD GASES W/O2 SATURATION: CPT

## 2021-03-20 PROCEDURE — 6360000002 HC RX W HCPCS: Performed by: PHYSICIAN ASSISTANT

## 2021-03-20 PROCEDURE — 99254 IP/OBS CNSLTJ NEW/EST MOD 60: CPT | Performed by: SURGERY

## 2021-03-20 PROCEDURE — 6360000002 HC RX W HCPCS: Performed by: EMERGENCY MEDICINE

## 2021-03-20 PROCEDURE — 93005 ELECTROCARDIOGRAM TRACING: CPT | Performed by: PHYSICIAN ASSISTANT

## 2021-03-20 PROCEDURE — 2060000000 HC ICU INTERMEDIATE R&B

## 2021-03-20 PROCEDURE — 85027 COMPLETE CBC AUTOMATED: CPT

## 2021-03-20 PROCEDURE — 83735 ASSAY OF MAGNESIUM: CPT

## 2021-03-20 PROCEDURE — 70450 CT HEAD/BRAIN W/O DYE: CPT

## 2021-03-20 PROCEDURE — 71250 CT THORAX DX C-: CPT

## 2021-03-20 RX ORDER — NICOTINE 21 MG/24HR
1 PATCH, TRANSDERMAL 24 HOURS TRANSDERMAL DAILY
Status: DISCONTINUED | OUTPATIENT
Start: 2021-03-20 | End: 2021-03-25 | Stop reason: HOSPADM

## 2021-03-20 RX ORDER — PREDNISONE 20 MG/1
60 TABLET ORAL ONCE
Status: COMPLETED | OUTPATIENT
Start: 2021-03-20 | End: 2021-03-20

## 2021-03-20 RX ORDER — MORPHINE SULFATE 10 MG/ML
6 INJECTION, SOLUTION INTRAMUSCULAR; INTRAVENOUS ONCE
Status: COMPLETED | OUTPATIENT
Start: 2021-03-20 | End: 2021-03-20

## 2021-03-20 RX ORDER — MECOBALAMIN 5000 MCG
10 TABLET,DISINTEGRATING ORAL NIGHTLY
Status: DISCONTINUED | OUTPATIENT
Start: 2021-03-20 | End: 2021-03-25 | Stop reason: HOSPADM

## 2021-03-20 RX ORDER — HYDROCODONE BITARTRATE AND ACETAMINOPHEN 5; 325 MG/1; MG/1
1 TABLET ORAL 3 TIMES DAILY PRN
Status: DISCONTINUED | OUTPATIENT
Start: 2021-03-20 | End: 2021-03-21

## 2021-03-20 RX ORDER — METHYLPREDNISOLONE SODIUM SUCCINATE 125 MG/2ML
60 INJECTION, POWDER, LYOPHILIZED, FOR SOLUTION INTRAMUSCULAR; INTRAVENOUS ONCE
Status: COMPLETED | OUTPATIENT
Start: 2021-03-20 | End: 2021-03-20

## 2021-03-20 RX ORDER — SODIUM CHLORIDE 0.9 % (FLUSH) 0.9 %
10 SYRINGE (ML) INJECTION PRN
Status: DISCONTINUED | OUTPATIENT
Start: 2021-03-20 | End: 2021-03-25 | Stop reason: HOSPADM

## 2021-03-20 RX ORDER — LIDOCAINE 4 G/G
1 PATCH TOPICAL DAILY
Status: DISCONTINUED | OUTPATIENT
Start: 2021-03-20 | End: 2021-03-25 | Stop reason: HOSPADM

## 2021-03-20 RX ORDER — LEVALBUTEROL 1.25 MG/.5ML
1.25 SOLUTION, CONCENTRATE RESPIRATORY (INHALATION) ONCE
Status: COMPLETED | OUTPATIENT
Start: 2021-03-20 | End: 2021-03-20

## 2021-03-20 RX ORDER — MAGNESIUM SULFATE IN WATER 40 MG/ML
2000 INJECTION, SOLUTION INTRAVENOUS ONCE
Status: COMPLETED | OUTPATIENT
Start: 2021-03-20 | End: 2021-03-20

## 2021-03-20 RX ORDER — SODIUM CHLORIDE FOR INHALATION 0.9 %
3 VIAL, NEBULIZER (ML) INHALATION ONCE
Status: COMPLETED | OUTPATIENT
Start: 2021-03-20 | End: 2021-03-20

## 2021-03-20 RX ORDER — 0.9 % SODIUM CHLORIDE 0.9 %
1000 INTRAVENOUS SOLUTION INTRAVENOUS ONCE
Status: COMPLETED | OUTPATIENT
Start: 2021-03-20 | End: 2021-03-20

## 2021-03-20 RX ADMIN — PREDNISONE 60 MG: 20 TABLET ORAL at 16:15

## 2021-03-20 RX ADMIN — METOPROLOL TARTRATE 50 MG: 25 TABLET, FILM COATED ORAL at 21:29

## 2021-03-20 RX ADMIN — METHYLPREDNISOLONE SODIUM SUCCINATE 60 MG: 125 INJECTION, POWDER, FOR SOLUTION INTRAMUSCULAR; INTRAVENOUS at 16:30

## 2021-03-20 RX ADMIN — LEVALBUTEROL 1.25 MG: 1.25 SOLUTION, CONCENTRATE RESPIRATORY (INHALATION) at 16:11

## 2021-03-20 RX ADMIN — HYDROCODONE BITARTRATE AND ACETAMINOPHEN 1 TABLET: 5; 325 TABLET ORAL at 21:29

## 2021-03-20 RX ADMIN — MAGNESIUM SULFATE HEPTAHYDRATE 2000 MG: 40 INJECTION, SOLUTION INTRAVENOUS at 16:26

## 2021-03-20 RX ADMIN — Medication 10 MG: at 22:19

## 2021-03-20 RX ADMIN — ISODIUM CHLORIDE 3 ML: 0.03 SOLUTION RESPIRATORY (INHALATION) at 16:11

## 2021-03-20 RX ADMIN — MORPHINE SULFATE 6 MG: 10 INJECTION, SOLUTION INTRAMUSCULAR; INTRAVENOUS at 17:45

## 2021-03-20 RX ADMIN — SODIUM CHLORIDE 1000 ML: 9 INJECTION, SOLUTION INTRAVENOUS at 16:27

## 2021-03-20 ASSESSMENT — PAIN DESCRIPTION - PAIN TYPE: TYPE: ACUTE PAIN

## 2021-03-20 ASSESSMENT — PAIN DESCRIPTION - DESCRIPTORS: DESCRIPTORS: BURNING;SHARP;STABBING

## 2021-03-20 ASSESSMENT — PAIN SCALES - GENERAL: PAINLEVEL_OUTOF10: 9

## 2021-03-20 ASSESSMENT — PAIN DESCRIPTION - ORIENTATION: ORIENTATION: LEFT

## 2021-03-20 ASSESSMENT — PAIN DESCRIPTION - LOCATION: LOCATION: RIB CAGE

## 2021-03-20 NOTE — ED NOTES
Patient vomited after given to prednisone and 2 out of the 3 pills were in the bag. Dr. Julio C Foster notified. DR. Julio C Foster also notified of critical ABG values.       Estefani Rothman, RN  03/20/21 [address] [employer] [place], RN  03/20/21 9802

## 2021-03-20 NOTE — ED PROVIDER NOTES
ED Attending: CC: Jacqui  Department of Emergency Medicine   ED  Encounter Note  Admit Date/RoomTime: 3/20/2021  2:11 PM  ED Room:     NAME: Selam Simpson  : 1960  MRN: [id number]     Chief Complaint:  Fall (multiple falls over the last couple weeks) and Rib Pain (left sided)    HISTORY OF PRESENT ILLNESS        Selam Simpson is a 61 y.o. male who presents to the ED by ambulance for multiple falls, left rib pain, palpitations and shortness of breath, beginning several days ago. The complaint has been persistent and are moderate in severity. Patient has longstanding history of COPD, diabetes hyperlipidemia hypertension and history of cardiac arrhythmias where he was evaluated more than 1 occasion at [facility].  He states he was told at least at 1 time that he has atrial fibrillation then he was told he does not have atrial fibrillation. He does not take any chronic medications for rate control or blood thinners. He is unsure if he is actually having loss of consciousness because that he feels dizzy at times when he stands and walks around. At the moment he is denying any abdominal pain, back pain or extremity discomfort. He does have old bruising around his right eye, scabs over his right knee and ecchymosis to the forehead. He is denying any neck pain, fever or urinary complaints also. ROS   Pertinent positives and negatives are stated within HPI, all other systems reviewed and are negative. Past Medical History:  has a past medical history of Diabetes mellitus ([address] Hyperlipidemia, and Hypertension. Surgical History:  has a past surgical history that includes Appendectomy; Lung surgery; Colonoscopy; and Neck surgery (N/A, 2020). Social History:  reports that he has been smoking. He has been smoking about 0.50 packs per day.  He has never used smokeless tobacco. He reports current alcohol use of about 5.0 standard drinks of alcohol per week. He reports that he does not use drugs. Family History: family history is not on file. Allergies: Patient has no known allergies. PHYSICAL EXAM   Oxygen Saturation Interpretation: Normal.        ED Triage Vitals   BP Temp Temp Source Pulse Resp SpO2 Height Weight   03/20/21 1610 03/20/21 1412 03/20/21 1412 03/20/21 1412 03/20/21 1412 03/20/21 1412 03/20/21 1412 03/20/21 1412   121/86 98.1 °F (36.7 °C) Oral 130 20 98 % 5' 11\" (1.803 m) 260 lb (117.9 kg)         Physical Exam  Constitutional/General: Chronically ill-appearing, seated in bed uncomfortable appearing at times, nontoxic  HEENT:  NC/multistage bruising forehead and around right eye from recent trauma. No tenderness palpated. PERRLA,  Airway patent. Neck: Supple, full ROM, non tender to palpation in the midline, no stridor, no crepitus, no meningeal signs  Respiratory: Breath sounds equal bilaterally. Scattered wheezes throughout both lung periods. CV: Tachycardic regularly irregular rhythm ranging anywhere from 110 to 150 bpm.. No murmurs, gallops, or rubs. 2+ distal pulses  Chest: Significant tenderness left lateral chest wall without crepitus or wounds noted. No rash. Remainder of anterior and posterior thorax nontraumatic nontender. GI:  Abdomen Soft, obese, Non tender, Non distended. +BS. No rebound, guarding, or rigidity. No pulsatile masses. Musculoskeletal: Moves all extremities x 4. Warm and well perfused, no clubbing, cyanosis, or edema. Capillary refill <3 seconds. Healing scabs over right knee, nontender full range of motion. Integument: skin warm and dry. No rashes. Lymphatic: no lymphadenopathy noted  Neurologic: GCS 15, no focal deficits, symmetric strength 5/5 in the upper and lower extremities bilaterally  Psychiatric: Anxious.       Lab / Imaging Results   (All laboratory and radiology results have been personally reviewed by myself)  Labs:  Results for orders placed or performed during the hospital encounter of 03/20/21   COVID-19, Rapid    Specimen: Nasopharyngeal Swab; Nasal   Result Value Ref Range    SARS-CoV-2, NAAT Not Detected Not Detected   CBC   Result Value Ref Range    WBC 17.1 (H) 4.5 - 11.5 E9/L    RBC 2.95 (L) 3.80 - 5.80 E12/L    Hemoglobin 11.1 (L) 12.5 - 16.5 g/dL    Hematocrit 31.8 (L) 37.0 - 54.0 %    .8 (H) 80.0 - 99.9 fL    MCH 37.6 (H) 26.0 - 35.0 pg    MCHC 34.9 (H) 32.0 - 34.5 %    RDW 14.1 11.5 - 15.0 fL    Platelets 241 452 - 565 E9/L    MPV 10.0 7.0 - 12.0 fL   Protime-INR   Result Value Ref Range    Protime 14.1 (H) 9.3 - 12.4 sec    INR 1.2    APTT   Result Value Ref Range    aPTT 26.0 24.5 - 35.1 sec   Brain Natriuretic Peptide   Result Value Ref Range    Pro-BNP 1,540 (H) 0 - 125 pg/mL   Magnesium   Result Value Ref Range    Magnesium 1.1 (LL) 1.6 - 2.6 mg/dL   SPECIMEN REJECTION   Result Value Ref Range    Rejected Test BMP TROP     Reason for Rejection see below    Troponin   Result Value Ref Range    Troponin 0.02 0.00 - 0.03 ng/mL   Basic Metabolic Panel   Result Value Ref Range    Sodium 133 132 - 146 mmol/L    Potassium 3.5 3.5 - 5.0 mmol/L    Chloride 87 (L) 98 - 107 mmol/L    CO2 26 22 - 29 mmol/L    Anion Gap 20 (H) 7 - 16 mmol/L    Glucose 162 (H) 74 - 99 mg/dL    BUN 25 (H) 8 - 23 mg/dL    CREATININE 1.2 0.7 - 1.2 mg/dL    GFR Non-African American >60 >=60 mL/min/1.73    GFR African American >60     Calcium 7.4 (L) 8.6 - 10.2 mg/dL   Blood Gas, Arterial   Result Value Ref Range    Date Analyzed [id number]     Time Analyzed 1617     Source: Blood Arterial     pH, Blood Gas 7.629 (HH) 7.350 - 7.450    PCO2 20.2 (L) 35.0 - 45.0 mmHg    PO2 91.3 75.0 - 100.0 mmHg    HCO3 20.7 (L) 22.0 - 26.0 mmol/L    B.E. 1.3 -3.0 - 3.0 mmol/L    O2 Sat 97.3 92.0 - 98.5 %    O2Hb 96.7 94.0 - 97.0 %    COHb 0.3 0.0 - 1.5 %    MetHb 0.3 0.0 - 1.5 %    O2 Content 16.4 mL/dL    HHb 2.7 0.0 - 5.0 %    tHb (est) 12.0 11.5 - 16.5 g/dL    Mode RA     Date Of Collection      Time Collected      Pt Temp 37           Lab [id number]     Critical(s) Notified Handed report to Dr/RN    EKG 12 Lead   Result Value Ref Range    Ventricular Rate 118 BPM    Atrial Rate 104 BPM    QRS Duration 74 ms    Q-T Interval 348 ms    QTc Calculation (Bazett) 487 ms    R Axis -12 degrees    T Axis 104 degrees   EKG 12 Lead   Result Value Ref Range    Ventricular Rate 131 BPM    Atrial Rate 120 BPM    P-R Interval 168 ms    QRS Duration 74 ms    Q-T Interval 342 ms    QTc Calculation (Bazett) 505 ms    R Axis -10 degrees    T Axis 130 degrees     Imaging: All Radiology results interpreted by Radiologist unless otherwise noted. CT HEAD WO CONTRAST   Final Result   1.   1.  There is no acute intracranial abnormality. Specifically, there is   no intracranial hemorrhage. 2. Age-appropriate atrophy and periventricular leukomalacia,   3. Chronic bilateral maxillary sinusitis         CT CHEST WO CONTRAST   Final Result   Old healed fractures of the 5th and 6th ribs on the left with no acute   fracture. Chronic obstructive lung changes with mild subsegmental atelectasis or   scarring scattered along the left lung base anteriorly and posteriorly with   associated pleural thickening posteriorly. Suggest follow-up with chest   x-rays to assure stability      Mildly atherosclerotic thoracic aorta with no aneurysm or dissection and   unremarkable mediastinum. Moderate coronary artery calcifications, suggest cardiology follow-up      4 cm right adrenal mass which may represent an adenoma but remains   indeterminate. Suggest a follow-up CT scan or MRI of the abdomen for further   characterization. Mild hepatomegaly and chronic liver changes with fatty replacement throughout      Probable gynecomastia bilaterally. EKG #1:  Interpreted by emergency department physician unless otherwise noted. Time:  1529    Rate: 131 bpm  Rhythm: Undetermined rhythm with regular rate. .  Interpretation: Indeterminate rhythm with nonspecific ST-T wave changes, occasional PVC. Comparison: There are no previous tracings available for comparison. ED Course / Medical Decision Making     Medications   sodium chloride flush 0.9 % injection 10 mL (has no administration in time range)   nicotine (NICODERM CQ) 21 MG/24HR 1 patch (1 patch Transdermal Patch Applied 3/20/21 1744)   lidocaine 4 % external patch 1 patch (1 patch Transdermal Patch Applied 3/20/21 2129)   melatonin disintegrating tablet 10 mg (10 mg Oral Given 3/20/21 2219)   HYDROcodone-acetaminophen (NORCO) 5-325 MG per tablet 1 tablet (1 tablet Oral Given 3/20/21 2129)   metoprolol tartrate (LOPRESSOR) tablet 50 mg (50 mg Oral Given 3/20/21 2129)   levalbuterol (XOPENEX) nebulizer solution 1.25 mg (1.25 mg Nebulization Given 3/20/21 1611)   sodium chloride nebulizer 0.9 % solution 3 mL (3 mLs Nebulization Given 3/20/21 1611)   0.9 % sodium chloride bolus (0 mLs Intravenous Stopped 3/20/21 1744)   predniSONE (DELTASONE) tablet 60 mg (60 mg Oral Given 3/20/21 1615)   magnesium sulfate 2000 mg in 50 mL IVPB premix (0 mg Intravenous Stopped 3/20/21 1829)   methylPREDNISolone sodium (SOLU-MEDROL) injection 60 mg (60 mg Intravenous Given 3/20/21 1630)   morphine (PF) injection 6 mg (6 mg Intravenous Given 3/20/21 1745)        Re-Evaluations:  3/20/21      Time: 5736    Patients condition remains stable. Consultations:             [address] [place] [address] for trauma services who feels comfortable with patient remaining at this facility as long as medical needs can be managed. IP CONSULT TO HOSPITALIST will admit to the monitored bed with appropriate consultations. Procedures:   none    MDM: Patient presented to the ER with complaints of rib pain after multiple falls. Found to be tachycardic, found to have wheezes throughout both lung fields.     Plan of Care/Counseling:  I reviewed today's visit with the patient in addition to providing specific details for the plan of care and counseling regarding the diagnosis and prognosis. Questions are answered at this time and are agreeable with the plan. ASSESSMENT     1. Hypomagnesemia    2. Other cardiac arrhythmia    3. COPD exacerbation (HCC)    4. Closed head injury, initial encounter    5. Syncope and collapse    6. Contusion, shoulder and upper arm, multiple sites, unspecified laterality, initial encounter    7. Closed fracture of multiple ribs of left side, initial encounter      This patient's ED course included: a personal history and physicial examination, re-evaluation prior to disposition, cardiac monitoring and continuous pulse oximetry  This patient has remained hemodynamically stable and been closely monitored during their ED course. PLAN   Inpatient Admission to Telemetry Unit. Patient condition is stable. New Medications     New Prescriptions    No medications on file     Electronically signed by BELGICA Cr   DD: 3/20/21  **This report was transcribed using voice recognition software. Every effort was made to ensure accuracy; however, inadvertent computerized transcription errors may be present.   [address] [place] [address]  03/20/21 3578

## 2021-03-20 NOTE — CONSULTS
ROS: negative spontaneous bleeding or bruising  Endocrine ROS: negative  lethargy, mood swings, palpitations or polydipsia/polyuria  Respiratory ROS: negative sputum changes, stridor, tachypnea or wheezing  Cardiovascular ROS: negative for - loss of consciousness, murmur or orthopnea  Gastrointestinal ROS: negative for - hematochezia or hematemesis  Genito-Urinary ROS: negative for -  genital discharge or hematuria  Musculoskeletal ROS: negative for - focal weakness, gangrene  Psych/Neuro ROS: negative for - visual or auditory hallucinations, suicidal ideation      Physical exam:   /86   Pulse 130   Temp 98.1 °F (36.7 °C) (Oral)   Resp 20   Ht 5' 11\" (1.803 m)   Wt 260 lb (117.9 kg)   SpO2 98%   BMI 36.26 kg/m²   General appearance:  NAD, appears stated age  Head: NCAT, PERRLA, EOMI, red conjunctiva  Neck: supple, no masses, trachea midline  Lungs: Equal chest rise bilateral, no retractions, no wheezing  Heart: Reg rate  Abdomen: soft, obese, nontender  Skin; warm and dry, no cyanosis  Gu: no cva tenderness  Extremities: atraumatic, no focal motor deficits, no open wounds  Psych: No tremor, visual hallucinations        Radiology: I reviewed relevant abdominal imaging from this admission and that available in the EMR including CT chest: No acute rib fractures they will appear to be healing    Assessment:  Gretchen Queen is a 61 y.o. male with recent falls old healing rib fractures COPD    Patient Active Problem List   Diagnosis    Epidermal cyst of neck    Cardiac arrhythmia       Plan:  No acute trauma issue  P.o. pain control  Incentive spirometer  PT OT  Time spent reviewing past medical, surgical, social and family history, vitals, nursing assessment and images. Time spent face to face with patient and family counciling and discussing care exceeded 50% of the time of the consult.  Additional time spent reviewing images and labs, discussing case with nursing, support staff and other physicians; as well as coordinating care.         Physician Signature: Electronically signed by Dr. Jeanie Landry  [phone] (p)

## 2021-03-21 ENCOUNTER — APPOINTMENT (OUTPATIENT)
Dept: ULTRASOUND IMAGING | Age: 61
DRG: 897 | End: 2021-03-21
Payer: COMMERCIAL

## 2021-03-21 LAB
ALBUMIN SERPL-MCNC: 3.6 G/DL (ref 3.5–5.2)
ALP BLD-CCNC: 173 U/L (ref 40–129)
ALT SERPL-CCNC: 11 U/L (ref 0–40)
ANION GAP SERPL CALCULATED.3IONS-SCNC: 18 MMOL/L (ref 7–16)
AST SERPL-CCNC: 36 U/L (ref 0–39)
BASOPHILS ABSOLUTE: 0.02 E9/L (ref 0–0.2)
BASOPHILS RELATIVE PERCENT: 0.2 % (ref 0–2)
BILIRUB SERPL-MCNC: 0.8 MG/DL (ref 0–1.2)
BUN BLDV-MCNC: 33 MG/DL (ref 8–23)
CALCIUM SERPL-MCNC: 7.3 MG/DL (ref 8.6–10.2)
CHLORIDE BLD-SCNC: 86 MMOL/L (ref 98–107)
CO2: 25 MMOL/L (ref 22–29)
CREAT SERPL-MCNC: 1.7 MG/DL (ref 0.7–1.2)
EKG ATRIAL RATE: 104 BPM
EKG ATRIAL RATE: 120 BPM
EKG P-R INTERVAL: 168 MS
EKG Q-T INTERVAL: 342 MS
EKG Q-T INTERVAL: 348 MS
EKG QRS DURATION: 74 MS
EKG QRS DURATION: 74 MS
EKG QTC CALCULATION (BAZETT): 487 MS
EKG QTC CALCULATION (BAZETT): 505 MS
EKG R AXIS: -10 DEGREES
EKG R AXIS: -12 DEGREES
EKG T AXIS: 104 DEGREES
EKG T AXIS: 130 DEGREES
EKG VENTRICULAR RATE: 118 BPM
EKG VENTRICULAR RATE: 131 BPM
EOSINOPHILS ABSOLUTE: 0.01 E9/L (ref 0.05–0.5)
EOSINOPHILS RELATIVE PERCENT: 0.1 % (ref 0–6)
GFR AFRICAN AMERICAN: 50
GFR NON-AFRICAN AMERICAN: 41 ML/MIN/1.73
GLUCOSE BLD-MCNC: 144 MG/DL (ref 74–99)
HCT VFR BLD CALC: 28.3 % (ref 37–54)
HEMOGLOBIN: 9.8 G/DL (ref 12.5–16.5)
IMMATURE GRANULOCYTES #: 0.11 E9/L
IMMATURE GRANULOCYTES %: 0.8 % (ref 0–5)
LYMPHOCYTES ABSOLUTE: 2.54 E9/L (ref 1.5–4)
LYMPHOCYTES RELATIVE PERCENT: 19.1 % (ref 20–42)
MAGNESIUM: 1.6 MG/DL (ref 1.6–2.6)
MCH RBC QN AUTO: 38.4 PG (ref 26–35)
MCHC RBC AUTO-ENTMCNC: 34.6 % (ref 32–34.5)
MCV RBC AUTO: 111 FL (ref 80–99.9)
MONOCYTES ABSOLUTE: 0.7 E9/L (ref 0.1–0.95)
MONOCYTES RELATIVE PERCENT: 5.3 % (ref 2–12)
NEUTROPHILS ABSOLUTE: 9.9 E9/L (ref 1.8–7.3)
NEUTROPHILS RELATIVE PERCENT: 74.5 % (ref 43–80)
PDW BLD-RTO: 14.3 FL (ref 11.5–15)
PLATELET # BLD: 292 E9/L (ref 130–450)
PMV BLD AUTO: 9.8 FL (ref 7–12)
POLYCHROMASIA: ABNORMAL
POTASSIUM REFLEX MAGNESIUM: 3.6 MMOL/L (ref 3.5–5)
RBC # BLD: 2.55 E12/L (ref 3.8–5.8)
SODIUM BLD-SCNC: 129 MMOL/L (ref 132–146)
TOTAL PROTEIN: 6.5 G/DL (ref 6.4–8.3)
WBC # BLD: 13.3 E9/L (ref 4.5–11.5)

## 2021-03-21 PROCEDURE — 99254 IP/OBS CNSLTJ NEW/EST MOD 60: CPT | Performed by: INTERNAL MEDICINE

## 2021-03-21 PROCEDURE — 97161 PT EVAL LOW COMPLEX 20 MIN: CPT | Performed by: PHYSICAL THERAPIST

## 2021-03-21 PROCEDURE — 93880 EXTRACRANIAL BILAT STUDY: CPT

## 2021-03-21 PROCEDURE — 94640 AIRWAY INHALATION TREATMENT: CPT

## 2021-03-21 PROCEDURE — 6370000000 HC RX 637 (ALT 250 FOR IP): Performed by: INTERNAL MEDICINE

## 2021-03-21 PROCEDURE — 97530 THERAPEUTIC ACTIVITIES: CPT | Performed by: PHYSICAL THERAPIST

## 2021-03-21 PROCEDURE — 2060000000 HC ICU INTERMEDIATE R&B

## 2021-03-21 PROCEDURE — 2580000003 HC RX 258: Performed by: INTERNAL MEDICINE

## 2021-03-21 PROCEDURE — 6370000000 HC RX 637 (ALT 250 FOR IP): Performed by: PHYSICIAN ASSISTANT

## 2021-03-21 PROCEDURE — 93010 ELECTROCARDIOGRAM REPORT: CPT | Performed by: INTERNAL MEDICINE

## 2021-03-21 PROCEDURE — 85025 COMPLETE CBC W/AUTO DIFF WBC: CPT

## 2021-03-21 PROCEDURE — 80053 COMPREHEN METABOLIC PANEL: CPT

## 2021-03-21 PROCEDURE — 83735 ASSAY OF MAGNESIUM: CPT

## 2021-03-21 RX ORDER — PANTOPRAZOLE SODIUM 40 MG/1
40 TABLET, DELAYED RELEASE ORAL
Status: DISCONTINUED | OUTPATIENT
Start: 2021-03-21 | End: 2021-03-25 | Stop reason: HOSPADM

## 2021-03-21 RX ORDER — ATORVASTATIN CALCIUM 40 MG/1
40 TABLET, FILM COATED ORAL NIGHTLY
Status: DISCONTINUED | OUTPATIENT
Start: 2021-03-21 | End: 2021-03-25 | Stop reason: HOSPADM

## 2021-03-21 RX ORDER — AMLODIPINE BESYLATE 5 MG/1
10 TABLET ORAL DAILY
Status: DISCONTINUED | OUTPATIENT
Start: 2021-03-21 | End: 2021-03-22

## 2021-03-21 RX ORDER — HYDROCODONE BITARTRATE AND ACETAMINOPHEN 5; 325 MG/1; MG/1
1 TABLET ORAL ONCE
Status: COMPLETED | OUTPATIENT
Start: 2021-03-21 | End: 2021-03-21

## 2021-03-21 RX ORDER — METOPROLOL TARTRATE 50 MG/1
100 TABLET, FILM COATED ORAL 2 TIMES DAILY
Status: DISCONTINUED | OUTPATIENT
Start: 2021-03-21 | End: 2021-03-22

## 2021-03-21 RX ORDER — ALOGLIPTIN 12.5 MG/1
12.5 TABLET, FILM COATED ORAL DAILY
Status: DISCONTINUED | OUTPATIENT
Start: 2021-03-21 | End: 2021-03-25 | Stop reason: HOSPADM

## 2021-03-21 RX ORDER — HYDROCODONE BITARTRATE AND ACETAMINOPHEN 10; 325 MG/1; MG/1
1 TABLET ORAL 3 TIMES DAILY PRN
Status: DISCONTINUED | OUTPATIENT
Start: 2021-03-21 | End: 2021-03-23

## 2021-03-21 RX ORDER — METOPROLOL TARTRATE 50 MG/1
50 TABLET, FILM COATED ORAL ONCE
Status: DISCONTINUED | OUTPATIENT
Start: 2021-03-21 | End: 2021-03-22

## 2021-03-21 RX ORDER — TRAZODONE HYDROCHLORIDE 50 MG/1
50 TABLET ORAL NIGHTLY
Status: DISCONTINUED | OUTPATIENT
Start: 2021-03-21 | End: 2021-03-25 | Stop reason: HOSPADM

## 2021-03-21 RX ORDER — PRAVASTATIN SODIUM 20 MG
20 TABLET ORAL NIGHTLY
Status: DISCONTINUED | OUTPATIENT
Start: 2021-03-21 | End: 2021-03-21

## 2021-03-21 RX ORDER — LISINOPRIL 10 MG/1
40 TABLET ORAL DAILY
Status: DISCONTINUED | OUTPATIENT
Start: 2021-03-21 | End: 2021-03-21

## 2021-03-21 RX ORDER — IPRATROPIUM BROMIDE AND ALBUTEROL SULFATE 2.5; .5 MG/3ML; MG/3ML
1 SOLUTION RESPIRATORY (INHALATION)
Status: DISCONTINUED | OUTPATIENT
Start: 2021-03-21 | End: 2021-03-25 | Stop reason: HOSPADM

## 2021-03-21 RX ORDER — HYDROCODONE BITARTRATE AND ACETAMINOPHEN 10; 325 MG/1; MG/1
1 TABLET ORAL 3 TIMES DAILY
Status: DISCONTINUED | OUTPATIENT
Start: 2021-03-21 | End: 2021-03-21

## 2021-03-21 RX ORDER — SODIUM CHLORIDE 9 MG/ML
INJECTION, SOLUTION INTRAVENOUS CONTINUOUS
Status: DISCONTINUED | OUTPATIENT
Start: 2021-03-21 | End: 2021-03-23

## 2021-03-21 RX ORDER — AMLODIPINE BESYLATE AND BENAZEPRIL HYDROCHLORIDE 10; 40 MG/1; MG/1
1 CAPSULE ORAL DAILY
Status: DISCONTINUED | OUTPATIENT
Start: 2021-03-21 | End: 2021-03-21 | Stop reason: CLARIF

## 2021-03-21 RX ADMIN — METFORMIN HYDROCHLORIDE 500 MG: 500 TABLET ORAL at 08:30

## 2021-03-21 RX ADMIN — HYDROCODONE BITARTRATE AND ACETAMINOPHEN 1 TABLET: 5; 325 TABLET ORAL at 09:38

## 2021-03-21 RX ADMIN — ATORVASTATIN CALCIUM 40 MG: 40 TABLET, FILM COATED ORAL at 23:55

## 2021-03-21 RX ADMIN — AMLODIPINE BESYLATE 10 MG: 5 TABLET ORAL at 09:37

## 2021-03-21 RX ADMIN — IPRATROPIUM BROMIDE AND ALBUTEROL SULFATE 1 AMPULE: .5; 3 SOLUTION RESPIRATORY (INHALATION) at 17:09

## 2021-03-21 RX ADMIN — TRAZODONE HYDROCHLORIDE 50 MG: 50 TABLET ORAL at 22:19

## 2021-03-21 RX ADMIN — IPRATROPIUM BROMIDE AND ALBUTEROL SULFATE 1 AMPULE: .5; 3 SOLUTION RESPIRATORY (INHALATION) at 13:04

## 2021-03-21 RX ADMIN — HYDROCODONE BITARTRATE AND ACETAMINOPHEN 1 TABLET: 10; 325 TABLET ORAL at 23:55

## 2021-03-21 RX ADMIN — IPRATROPIUM BROMIDE AND ALBUTEROL SULFATE 1 AMPULE: .5; 3 SOLUTION RESPIRATORY (INHALATION) at 05:24

## 2021-03-21 RX ADMIN — METFORMIN HYDROCHLORIDE 500 MG: 500 TABLET ORAL at 15:55

## 2021-03-21 RX ADMIN — SODIUM CHLORIDE: 9 INJECTION, SOLUTION INTRAVENOUS at 10:57

## 2021-03-21 RX ADMIN — PANTOPRAZOLE SODIUM 40 MG: 40 TABLET, DELAYED RELEASE ORAL at 08:30

## 2021-03-21 RX ADMIN — Medication 10 MG: at 22:19

## 2021-03-21 RX ADMIN — LISINOPRIL 40 MG: 10 TABLET ORAL at 09:37

## 2021-03-21 RX ADMIN — ALOGLIPTIN 12.5 MG: 12.5 TABLET, FILM COATED ORAL at 09:38

## 2021-03-21 RX ADMIN — HYDROCODONE BITARTRATE AND ACETAMINOPHEN 1 TABLET: 5; 325 TABLET ORAL at 10:58

## 2021-03-21 RX ADMIN — HYDROCODONE BITARTRATE AND ACETAMINOPHEN 1 TABLET: 10; 325 TABLET ORAL at 15:55

## 2021-03-21 RX ADMIN — METOPROLOL TARTRATE 50 MG: 25 TABLET, FILM COATED ORAL at 09:37

## 2021-03-21 ASSESSMENT — PAIN DESCRIPTION - LOCATION: LOCATION: RIB CAGE

## 2021-03-21 ASSESSMENT — PAIN SCALES - GENERAL
PAINLEVEL_OUTOF10: 6
PAINLEVEL_OUTOF10: 3
PAINLEVEL_OUTOF10: 8
PAINLEVEL_OUTOF10: 10

## 2021-03-21 ASSESSMENT — PAIN DESCRIPTION - PAIN TYPE
TYPE: ACUTE PAIN

## 2021-03-21 ASSESSMENT — PAIN DESCRIPTION - ONSET: ONSET: ON-GOING

## 2021-03-21 NOTE — PROGRESS NOTES
Pt complains of dizziness intermittently when going from sitting to standing. Will check orthostatic BP next time pt gets up.

## 2021-03-21 NOTE — ED NOTES
Patient is reporting that pain in left side of ribs are 10/10 and is requesting something more for pain. An attempt to contact Dr. Cristi Pryor will be made.       Sadaf Wing RN  03/21/21 3972

## 2021-03-21 NOTE — PROGRESS NOTES
Dr. Gladys Cárdenas notified about pt 80/48 BP, was told to hold second 50 mg metoprolol dose. No other orders at this time. Dr. Krista Crespo called to get ok to give pt Norco with hypotensive BP. He stated it was ok to give but hold lisinopril. Lisinopril was dc'd by Dr. Gladys Cárdenas earlier in the day.

## 2021-03-21 NOTE — CONSULTS
CHIEF COMPLAINT: Palpitations/Afib/Coronary calcifications    HISTORY OF PRESENT ILLNESS: Patient is a 61 y.o. male seen at the request of Patsy Simeon MD and not followed by cardiology. Consulted for palpitations and coronary calcifications. Patient presented with falls. Poor historian. States in past at [facility] he was told he had atrial fibrillation then later told he does not. Currently in afib. No CP or angina. Baseline HENDERSON he says from COPD. Denies other cardiac history.     Past Medical History:   Diagnosis Date    Diabetes mellitus ([facility] [address]     Hyperlipidemia     Hypertension        Patient Active Problem List   Diagnosis    Epidermal cyst of neck    Cardiac arrhythmia       No Known Allergies    Current Facility-Administered Medications   Medication Dose Route Frequency Provider Last Rate Last Admin    ipratropium-albuterol (DUONEB) nebulizer solution 1 ampule  1 ampule Inhalation Q4H WA Carol Diehl MD   1 ampule at 03/21/21 0524    metFORMIN (GLUCOPHAGE) tablet 500 mg  500 mg Oral BID WC Carol Diehl MD   500 mg at 03/21/21 0830    pravastatin (PRAVACHOL) tablet 20 mg  20 mg Oral Nightly Carol Diehl MD        pantoprazole (PROTONIX) tablet 40 mg  40 mg Oral QAM AC Carol Diehl MD   40 mg at 03/21/21 0830    alogliptin (NESINA) tablet 12.5 mg  12.5 mg Oral Daily Carol Diehl MD   12.5 mg at 03/21/21 8078    amLODIPine (NORVASC) tablet 10 mg  10 mg Oral Daily Carol Diehl MD   10 mg at 03/21/21 4227    lisinopril (PRINIVIL;ZESTRIL) tablet 40 mg  40 mg Oral Daily Carol Diehl MD   40 mg at 03/21/21 9958    0.9 % sodium chloride infusion   Intravenous Continuous Carol Diehl MD        sodium chloride flush 0.9 % injection 10 mL  10 mL Intravenous PRN Enrigue Prude, PA        nicotine (NICODERM CQ) 21 MG/24HR 1 patch  1 patch Transdermal Daily Enrigue Prude, PA   1 patch at 03/21/21 0940    lidocaine 4 % external patch 1 patch  1 patch Transdermal Daily Sunday MD Fide   1 patch at 03/20/21 2129    melatonin disintegrating tablet 10 mg  10 mg Oral Nightly Sunday MD Fide   10 mg at 03/20/21 2219    HYDROcodone-acetaminophen (NORCO) 5-325 MG per tablet 1 tablet  1 tablet Oral TID PRN Morrisyao Elizalde MD   1 tablet at 03/21/21 5738    metoprolol tartrate (LOPRESSOR) tablet 50 mg  50 mg Oral BID Sunday MD Fide   50 mg at 03/21/21 5739       Social History     Socioeconomic History    Marital status:      Spouse name: Not on file    Number of children: Not on file    Years of education: Not on file    Highest education level: Not on file   Occupational History    Not on file   Social Needs    Financial resource strain: Not on file    Food insecurity     Worry: Not on file     Inability: Not on file   [employer] needs     Medical: Not on file     Non-medical: Not on file   Tobacco Use    Smoking status: Current Every Day Smoker     Packs/day: 0.50    Smokeless tobacco: Never Used   Substance and Sexual Activity    Alcohol use: Yes     Alcohol/week: 5.0 standard drinks     Types: 5 Shots of liquor per week     Frequency: 4 or more times a week    Drug use: Never    Sexual activity: Not on file   Lifestyle    Physical activity     Days per week: Not on file     Minutes per session: Not on file    Stress: Not on file   Relationships    Social connections     Talks on phone: Not on file     Gets together: Not on file     Attends Jain service: Not on file     Active member of club or organization: Not on file     Attends meetings of clubs or organizations: Not on file     Relationship status: Not on file    Intimate partner violence     Fear of current or ex partner: Not on file     Emotionally abused: Not on file     Physically abused: Not on file     Forced sexual activity: Not on file   Other Topics Concern    Not on file   Social History Narrative    Not on file       History reviewed.  No pertinent family history. Review of Systems:   Heart: as above   Lungs: as above   Eyes: denies changes in vision or discharge. Ears: denies changes in hearing or pain. Nose: denies epistaxis or masses   Throat: denies sore throat or trouble swallowing. Neuro: denies numbness, tingling, tremors. Skin: denies rashes or itching. : denies hematuria, dysuria   GI: denies vomiting, diarrhea   Psych: denies mood changed, anxiety, depression. all others negative. Physical Exam   /69   Pulse 107   Temp 98.2 °F (36.8 °C) (Infrared)   Resp 18   Ht 5' 11\" (1.803 m)   Wt 260 lb (117.9 kg)   SpO2 97%   BMI 36.26 kg/m²   Constitutional: Oriented to person, place, and time. Well-developed and well-nourished. No distress. Head: Normocephalic and atraumatic. Eyes: EOM are normal. Pupils are equal, round, and reactive to light. Neck: Normal range of motion. Neck supple. No hepatojugular reflux and no JVD present. Carotid bruit is not present. No tracheal deviation present. No thyromegaly present. Cardiovascular: Normal rate, regular rhythm, normal heart sounds and intact distal pulses. Exam reveals no gallop and no friction rub. No murmur heard. Pulmonary/Chest: Effort normal and breath sounds normal. No respiratory distress. No wheezes. No rales. No tenderness. Abdominal: Soft. Bowel sounds are normal. No distension and no mass. No tenderness. No rebound and no guarding. Musculoskeletal: Normal range of motion. No edema and no tenderness. Lymphadenopathy:   No cervical adenopathy. No groin adenopathy. Neurological: Alert and oriented to person, place, and time. Skin: Skin is warm and dry. No rash noted. Not diaphoretic. No erythema. Psychiatric: Normal mood and affect.  Behavior is normal.       CBC:   Lab Results   Component Value Date    WBC 13.3 03/21/2021    RBC 2.55 03/21/2021    HGB 9.8 03/21/2021    HCT 28.3 03/21/2021    .0 03/21/2021    MCH 38.4 03/21/2021    MCHC 34.6 03/21/2021    RDW 14.3 03/21/2021     03/21/2021    MPV 9.8 03/21/2021     BMP:   Lab Results   Component Value Date     03/21/2021    K 3.6 03/21/2021    CL 86 03/21/2021    CO2 25 03/21/2021    BUN 33 03/21/2021    LABALBU 3.6 03/21/2021    CREATININE 1.7 03/21/2021    CALCIUM 7.3 03/21/2021    GFRAA 50 03/21/2021    LABGLOM 41 03/21/2021     Magnesium:    Lab Results   Component Value Date    MG 1.6 03/21/2021     Cardiac Enzymes:   Lab Results   Component Value Date    TROPONINI 0.02 03/20/2021      PT/INR:    Lab Results   Component Value Date    PROTIME 14.1 03/20/2021    INR 1.2 03/20/2021     TSH:    Lab Results   Component Value Date    TSH 1.660 09/03/2019     Rhythm Strip: atrial fibrillation. EKG:  nonspecific ST and T waves changes, atrial fibrillation. CT chest without contrast 3/20/2021:  Old healed fractures of the 5th and 6th ribs on the left with no acute   fracture.       Chronic obstructive lung changes with mild subsegmental atelectasis or   scarring scattered along the left lung base anteriorly and posteriorly with   associated pleural thickening posteriorly.  Suggest follow-up with chest   x-rays to assure stability       Mildly atherosclerotic thoracic aorta with no aneurysm or dissection and   unremarkable mediastinum.       Moderate coronary artery calcifications, suggest cardiology follow-up       4 cm right adrenal mass which may represent an adenoma but remains   indeterminate.  Suggest a follow-up CT scan or MRI of the abdomen for further   characterization.       Mild hepatomegaly and chronic liver changes with fatty replacement throughout       Probable gynecomastia bilaterally. ASSESSMENT AND PLAN:  Patient Active Problem List   Diagnosis    Epidermal cyst of neck    Cardiac arrhythmia     1. Palpitations/Afib:    Labs reviewed. Echo. Continue titrated BB. Lovenox for now if okay with surgery.  Probably home on NOAC, but hold on that for now given fall issues. 2. Coronary calcifications: No CP or angina. Medically manage for now. Statin(high intensity). 3. Multiple falls    4. ANALI: Hold ACEI. 5. COPD    6. HTN: Observe. 7. Lipids: Statin. 8. DM: Per primary service. Trina Dewitt D.O.   Cardiologist  Cardiology, [address] [facility]

## 2021-03-21 NOTE — ED NOTES
Breakfast tray given. Report called and given to [facility].       Nubia Sheth RN  03/21/21 7993

## 2021-03-21 NOTE — PROGRESS NOTES
Physical Therapy Initial Evaluation    Room #:  4609/0852-07  Patient Name: Irene Miller  YOB: 1960  MRN: [id number]    Referring Provider: Steve Rhoades MD       Date of Service: 3/21/2021    Evaluating Physical Therapist: Jesús Carrera, PT  #[id number]       Diagnosis:   Cardiac arrhythmia [I49.9]    admitted to hospital with multiple falls over past few weeks with rib pain/fracture  on left, shortness of breath , closed head injury      Patient Active Problem List   Diagnosis    Epidermal cyst of neck    Cardiac arrhythmia    Hypomagnesemia        Tentative placement recommendation: Subacute vs Home Health Physical Therapy if patient meets goals    Equipment recommendation:  To be determined      Prior Level of Function: Patient ambulated independently    Rehab Potential: good    for baseline    Past medical history:   Past Medical History:   Diagnosis Date    Diabetes mellitus ([facility] [address]     Hyperlipidemia     Hypertension      Past Surgical History:   Procedure Laterality Date    APPENDECTOMY      COLONOSCOPY      LUNG SURGERY      from scar tissue    NECK SURGERY N/A 2/5/2020    EXCISION POSTERIOR SOFT TISSUE NEOPLASM NECK (CPT 20145) performed by Ines Butt MD at [address]       Precautions: , falls and alarm , irregular heart rate    SUBJECTIVE:    Social history: Patient lives with daughter   in a ranch home  with 1 step  to enter without Rail  Tub shower     Equipment owned: None,       [address]   How much difficulty turning over in bed?: A Little  How much difficulty sitting down on / standing up from a chair with arms?: A Little  How much difficulty moving from lying on back to sitting on side of bed?: A Little  How much help from another person moving to and from a bed to a chair?: A Little  How much help from another person needed to walk in hospital room?: A Little  How much help from another person for climbing 3-5 steps with a railing?: A Little  AM-[facility] Inpatient Mobility Raw Score : 18  AM-PAC Inpatient T-Scale Score : 43.63  Mobility Inpatient CMS 0-100% Score: 46.58  Mobility Inpatient CMS G-Code Modifier : CK    Nursing cleared patient for PT evaluation. The admitting diagnosis and active problem list as listed above have been reviewed prior to the initiation of this evaluation. OBJECTIVE;   Initial Evaluation  Date: 3/21/2021 Treatment Date:     Short Term/ Long Term   Goals   Was pt agreeable to Eval/treatment? Yes    To be met in 3 days   Pain level   10/10  left ribs     Bed Mobility    Rolling: Minimal assist of 1    Supine to sit: Minimal assist of 1    Sit to supine: Minimal assist of 1    Scooting: Minimal assist of 1    Rolling: Independent    Supine to sit:  Independent    Sit to supine: Independent    Scooting: Independent     Transfers Sit to stand: Minimal assist of 1    Sit to stand: Independent     Ambulation    4 steps using  hand held assist with Minimal assist of 1   with dizziness; hr  during all levels of mobility   50 feet using  no device with Independent    Stair negotiation: ascended and descended   Not assessed       1 step independent    ROM Within functional limits with exception of Left upper extremitiy d/t painful ribs and mm spasms    Increase range of motion 10% of affected joints    Strength RUE:  3+/5     LUE:  2/5  RLE:  4/5  LLE:  4/5   Increase strength in affected mm groups by 1/3 grade   Balance Sitting EOB:  fair    Dynamic Standing:  fair    Sitting EOB:  good    Dynamic Standing: good       Patient is Alert & Oriented x person, place, time and situation and follows directions    Sensation:  Patient  denies numbness and tingling     Edema:  yes left upper extremity    Endurance: poor d/t minimal sleep d/t pain and spasm and need to change positions every 15 min       Patient education  Patient educated on role of Physical Therapy, risks of immobility, safety and plan of care, importance of mobility while in hospital  and spinting of ribs during movement        Patient response to education:   Pt verbalized understanding Pt demonstrated skill Pt requires further education in this area   Yes Partial Yes      Treatment:  Patient practiced and was instructed/facilitated in the following treatment: Patient   Sat edge of bed 5 minutes with Supervision  to increase dynamic sitting balance and activity tolerance. seated and standing challenges for balance and endurance     Therapeutic Exercises:  not performed      At end of session, patient in chair with alarm call light and phone within reach,   all lines and tubes intact, nursing notified. Patient would benefit from continued skilled Physical Therapy to improve functional independence and quality of life. Patient's/ family goals   home        ASSESSMENT: Patient exhibits decreased strength, balance, endurance, range of motion and pain left ribs impairing functional mobility, transfers, gait , tolerance to activity and participation are barriers to d/c and require skilled intervention during hospital stay         Plan of Care:     -Bed Mobility: Lower extremity exercises   -Sitting Balance: Incorporate reaching activities to activate trunk muscles   -Standing Balance: Perform strengthening exercises in standing to promote motor control with or without upper extremity support   -Transfers: Provide instruction on proper hand and foot position for adequate transfer of weight onto lower extremities and use of gait device  -Gait: Gait training and Standing activities to improve: base of support, weight shift, weight bearing    -Endurance: Utilize Supervised activities to increase level of endurance to allow for safe functional mobility including transfers and gait     Patient and or family understand(s) diagnosis, prognosis, and plan of care. Frequency of treatments: Patient will be seen  daily.        Time in  450  Time out 516    Total Treatment Time  8 minutes    Evaluation time includes thorough review of current medical information, gathering information on past medical history/social history and prior level of function, completion of standardized testing/informal observation of tasks, assessment of data, and development of Plan of care and goals.      CPT codes:  Low Complexity PT evaluation (31242)  Therapeutic activities (66508)   8 minutes  1 unit(s)    Carlos Alberto Hayes, PT

## 2021-03-21 NOTE — ED NOTES
No answer from Dr. Geno Finney. Patient informed of time for next dose of pain medication. Patient expressing frustration.       Joe Hickey RN  03/21/21 2018

## 2021-03-21 NOTE — PROGRESS NOTES
Checked with pt that it is ok to add daughter Aniket Fraga on to emergency contact list per her request.

## 2021-03-22 LAB
ALBUMIN SERPL-MCNC: 3 G/DL (ref 3.5–5.2)
ALP BLD-CCNC: 149 U/L (ref 40–129)
ALT SERPL-CCNC: 10 U/L (ref 0–40)
ANION GAP SERPL CALCULATED.3IONS-SCNC: 15 MMOL/L (ref 7–16)
AST SERPL-CCNC: 32 U/L (ref 0–39)
BILIRUB SERPL-MCNC: 0.6 MG/DL (ref 0–1.2)
BUN BLDV-MCNC: 37 MG/DL (ref 8–23)
CALCIUM SERPL-MCNC: 6.9 MG/DL (ref 8.6–10.2)
CHLORIDE BLD-SCNC: 91 MMOL/L (ref 98–107)
CO2: 27 MMOL/L (ref 22–29)
CREAT SERPL-MCNC: 1.4 MG/DL (ref 0.7–1.2)
GFR AFRICAN AMERICAN: >60
GFR NON-AFRICAN AMERICAN: 52 ML/MIN/1.73
GLUCOSE BLD-MCNC: 109 MG/DL (ref 74–99)
HCT VFR BLD CALC: 27.3 % (ref 37–54)
HEMOGLOBIN: 9 G/DL (ref 12.5–16.5)
MAGNESIUM: 1.6 MG/DL (ref 1.6–2.6)
MCH RBC QN AUTO: 37.2 PG (ref 26–35)
MCHC RBC AUTO-ENTMCNC: 33 % (ref 32–34.5)
MCV RBC AUTO: 112.8 FL (ref 80–99.9)
PDW BLD-RTO: 14.3 FL (ref 11.5–15)
PLATELET # BLD: 235 E9/L (ref 130–450)
PMV BLD AUTO: 9.6 FL (ref 7–12)
POTASSIUM REFLEX MAGNESIUM: 3 MMOL/L (ref 3.5–5)
RBC # BLD: 2.42 E12/L (ref 3.8–5.8)
SODIUM BLD-SCNC: 133 MMOL/L (ref 132–146)
TOTAL PROTEIN: 5.7 G/DL (ref 6.4–8.3)
WBC # BLD: 10.5 E9/L (ref 4.5–11.5)

## 2021-03-22 PROCEDURE — 6370000000 HC RX 637 (ALT 250 FOR IP): Performed by: INTERNAL MEDICINE

## 2021-03-22 PROCEDURE — 2580000003 HC RX 258: Performed by: INTERNAL MEDICINE

## 2021-03-22 PROCEDURE — 99251 PR INITL INPATIENT CONSULT NEW/ESTAB PT 20 MIN: CPT | Performed by: NURSE PRACTITIONER

## 2021-03-22 PROCEDURE — 2060000000 HC ICU INTERMEDIATE R&B

## 2021-03-22 PROCEDURE — 36415 COLL VENOUS BLD VENIPUNCTURE: CPT

## 2021-03-22 PROCEDURE — 97530 THERAPEUTIC ACTIVITIES: CPT

## 2021-03-22 PROCEDURE — 99233 SBSQ HOSP IP/OBS HIGH 50: CPT | Performed by: INTERNAL MEDICINE

## 2021-03-22 PROCEDURE — 83735 ASSAY OF MAGNESIUM: CPT

## 2021-03-22 PROCEDURE — 85027 COMPLETE CBC AUTOMATED: CPT

## 2021-03-22 PROCEDURE — 6360000002 HC RX W HCPCS: Performed by: INTERNAL MEDICINE

## 2021-03-22 PROCEDURE — 97165 OT EVAL LOW COMPLEX 30 MIN: CPT

## 2021-03-22 PROCEDURE — 6370000000 HC RX 637 (ALT 250 FOR IP): Performed by: PHYSICIAN ASSISTANT

## 2021-03-22 PROCEDURE — 97535 SELF CARE MNGMENT TRAINING: CPT

## 2021-03-22 PROCEDURE — 94640 AIRWAY INHALATION TREATMENT: CPT

## 2021-03-22 PROCEDURE — 80053 COMPREHEN METABOLIC PANEL: CPT

## 2021-03-22 RX ORDER — METOPROLOL TARTRATE 50 MG/1
100 TABLET, FILM COATED ORAL DAILY
Status: DISCONTINUED | OUTPATIENT
Start: 2021-03-22 | End: 2021-03-23

## 2021-03-22 RX ORDER — POTASSIUM CHLORIDE 20 MEQ/1
40 TABLET, EXTENDED RELEASE ORAL ONCE
Status: COMPLETED | OUTPATIENT
Start: 2021-03-22 | End: 2021-03-22

## 2021-03-22 RX ORDER — MAGNESIUM SULFATE IN WATER 40 MG/ML
2000 INJECTION, SOLUTION INTRAVENOUS ONCE
Status: COMPLETED | OUTPATIENT
Start: 2021-03-22 | End: 2021-03-22

## 2021-03-22 RX ADMIN — IPRATROPIUM BROMIDE AND ALBUTEROL SULFATE 1 AMPULE: .5; 3 SOLUTION RESPIRATORY (INHALATION) at 06:20

## 2021-03-22 RX ADMIN — METFORMIN HYDROCHLORIDE 500 MG: 500 TABLET ORAL at 08:40

## 2021-03-22 RX ADMIN — HYDROCODONE BITARTRATE AND ACETAMINOPHEN 1 TABLET: 10; 325 TABLET ORAL at 16:09

## 2021-03-22 RX ADMIN — PANTOPRAZOLE SODIUM 40 MG: 40 TABLET, DELAYED RELEASE ORAL at 06:13

## 2021-03-22 RX ADMIN — ATORVASTATIN CALCIUM 40 MG: 40 TABLET, FILM COATED ORAL at 21:46

## 2021-03-22 RX ADMIN — HYDROCODONE BITARTRATE AND ACETAMINOPHEN 1 TABLET: 10; 325 TABLET ORAL at 08:39

## 2021-03-22 RX ADMIN — IPRATROPIUM BROMIDE AND ALBUTEROL SULFATE 1 AMPULE: .5; 3 SOLUTION RESPIRATORY (INHALATION) at 09:59

## 2021-03-22 RX ADMIN — IPRATROPIUM BROMIDE AND ALBUTEROL SULFATE 1 AMPULE: .5; 3 SOLUTION RESPIRATORY (INHALATION) at 18:24

## 2021-03-22 RX ADMIN — METFORMIN HYDROCHLORIDE 500 MG: 500 TABLET ORAL at 16:09

## 2021-03-22 RX ADMIN — SODIUM CHLORIDE: 9 INJECTION, SOLUTION INTRAVENOUS at 00:03

## 2021-03-22 RX ADMIN — Medication 10 MG: at 21:46

## 2021-03-22 RX ADMIN — IPRATROPIUM BROMIDE AND ALBUTEROL SULFATE 1 AMPULE: .5; 3 SOLUTION RESPIRATORY (INHALATION) at 14:25

## 2021-03-22 RX ADMIN — TRAZODONE HYDROCHLORIDE 50 MG: 50 TABLET ORAL at 21:46

## 2021-03-22 RX ADMIN — POTASSIUM CHLORIDE 40 MEQ: 1500 TABLET, EXTENDED RELEASE ORAL at 08:43

## 2021-03-22 RX ADMIN — ALOGLIPTIN 12.5 MG: 12.5 TABLET, FILM COATED ORAL at 10:11

## 2021-03-22 RX ADMIN — MAGNESIUM SULFATE HEPTAHYDRATE 2000 MG: 40 INJECTION, SOLUTION INTRAVENOUS at 13:28

## 2021-03-22 RX ADMIN — METOPROLOL TARTRATE 100 MG: 50 TABLET, FILM COATED ORAL at 08:40

## 2021-03-22 ASSESSMENT — PAIN DESCRIPTION - PAIN TYPE
TYPE: ACUTE PAIN
TYPE: ACUTE PAIN

## 2021-03-22 ASSESSMENT — PAIN SCALES - GENERAL
PAINLEVEL_OUTOF10: 10
PAINLEVEL_OUTOF10: 10

## 2021-03-22 ASSESSMENT — PAIN DESCRIPTION - DESCRIPTORS: DESCRIPTORS: CONSTANT;ACHING;DISCOMFORT

## 2021-03-22 ASSESSMENT — PAIN DESCRIPTION - ORIENTATION: ORIENTATION: LEFT

## 2021-03-22 NOTE — CONSULTS
PSYCHIATRY ATTENDING CONSULT    Tele-health encounter originating from [address] to patient at [address] [place] [address]. Patient assessed through telehealth encounter, patient is in [address] in [facility]. I have assessed and evaluated the patient today through telehealth, and agree with the below assessment and recommendations by the medical student. Mental status examination is limited due to telehealth encounter. Mental status examination reveals 80-year-old male who is superficially forthcoming and cooperative for assessment. Unable to assess psychomotor due to limitations of telehealth encounter. Speech is regular rate rhythm tone well articulated he is able to answer questions with relevance and there is no delayed or long latency of response. Unable to assess eye contact. Mood is \"not that great. \"  Unable to assess affect due to telehealth encounter. Thought process is linear and goal directed, the patient states that he wishes to feel better. Thought content is devoid of auditory or visual hallucinations or no overt or covert signs of psychosis or paranoia. The patient does demonstrate some neurovegetative signs of depression including anhedonia low mood low motivation and increased sleep. Cognitive function appears to be below baseline due to illness. Memory testing not completed at this time. Insight judgment and impulse control are fair. He is alert and oriented to person place time and situation able to recount events leading to his hospitalization. REASON FOR CONSULT:  Depression    REQUESTING PHYSICIAN:  Carie Mehta MD    CHIEF COMPLAINT: \"I'm tired of falling and feeling bad\"    HISTORY OF PRESENT ILLNESS:  Thresa Holter  is a 61 y.o.   male  Who is on temporary disability, former forging , unmarried, lives with daughter, has past psychiatric history of depression, no history of inpatient psychiatric hospitalization, doesn't follow outpatient counseling services was admitted on 3/20 at [facility] due to recent history of recurrent falls and rib pain. He was medically managed and started on Desyrel 50 mg QD, General surgery and cardiology were consulted. Psychiatry was consulted for evaluation of his depression. This morning, the patient was evaluated via telemedicine (phone call). The patient's mood was \"not great\" due to the fact that he was in pain. He doesn't list any identifiable non-medical reason for his depressed mood. He admits to sleep disturbances, loss of interest, guilt, impaired concentration, occasional poor appetite (no weight changes), low self-esteem,. He denies feeling sad, suicidal ideation or plan, homicidal ideation or plan, manic history, distractibility, impulsivity, paranoia, auditory/visual/tactile hallucinations. Does not have access to guns or weapons. PAST PSYCHIATRIC HISTORY:  Depression. No history of inpatient psychiatric hospitalization, No outpatient counseling services follow up, Not currently on home psychiatric medications, past drug trial included Zoloft. Legal history: Denies juvenile or criminal history. Substance Abuse history: Admits to past substance abuse of cocaine, LSD, marijuana but not currently. He currently smokes 1.5-2 packs of cigarettes per day and drinks about 1 gallon of alcohol every 4 days. ALLERGIES:  Patient has no known allergies. FAMILY PSYCHIATRIC HISTORY: Patient denies any pertinent psychiatric family history. SOCIAL HISTORY: The patient moved around a lot growing up and describes his childhood as unable due to the constant moving. Educational background consists of GED. 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 Relationship status is unmarried and not in a relationship. He has 3 children. He lives with his youngest daughter and 3 grandchildren.  He denies significant physical, emotional, sexual abuse history. SUBSTANCE ABUSE HISTORY:  reports that he has been smoking. He has been smoking about 0.50 packs per day. He has never used smokeless tobacco. He reports current alcohol use of about 5.0 standard drinks of alcohol per week. He reports that he does not use drugs. MENTAL STATUS EXAMINATION  Appearance: Unable to access Behavior: cooperative Mood: \"not great\" Affect: unable to access Speech: decreased rate & volume Thought Process: linear and coherent Thought Content: devoid of AVH Perception: no obvious perceptual abnormalities detected  Orientation: AxO x 3 Concentration: poor (serial 7's) Memory: fair (recall 2/3) Abstraction: fair Fund of knowledge: fair Insight: good Judgement: good      ASSESSMENT:   Depression related to medical condition      PLAN & RECOMMENDATIONS:    The plan of care and recommendations have been reviewed with Dr. Estefani Gomez and the collaborative care team.     Patient is not suicidal, homicidal, psychotic or manic and does not meet criteria for inpatient psychiatric hospitalization or stabilization. There is no acute need for psychiatric intervention. The patient expresses interest in beginning psychotropic medications for depression, the risk-benefit ratio is not consistent with starting any psychotropic medications at this time due to the patient's cardiac disorder. Recommend discontinuing the trazodone 50 mg at at bedtime due to prolonged QTC. This medication is known to prolong QTC. Multiple psychotropic medications have strong cardiogenic affect and are contraindicated in light of cardiac disorders and arrhthymias. Patient continues to have depression once discharged from the hospital we recommend that he follows up with outpatient mental health services including therapy and psychotropic medication management in the outpatient setting. Social work may set up OP appointments prior to discharge. Thank you for the consult.   Please call if questions.          Romina Robertson 3/22/2021 12:31 PM

## 2021-03-22 NOTE — PROGRESS NOTES
Dr. Anabel Palma notified of pt BP and that he received am medications. Order to increased NS to 100 mL/hr. Stated that he will be back around to see patient.

## 2021-03-22 NOTE — PROGRESS NOTES
CHIEF COMPLAINT: Follow-up for palpitations, atrial fibrillation, coronary calcifications    Date of Service: 3/22/2021    HISTORY OF PRESENT ILLNESS:  Patient presented with falls. Poor historian. States in past at [facility] he was told he had atrial fibrillation then later told he does not. Current telemetry demonstrates atrial fibrillation, rate 110    No CP or angina. Baseline HENDERSON which he says is from COPD. Denies other cardiac history.     Review of Systems:  Cardiac: As per HPI  General: No fever, chills  Pulmonary: As per HPI  HEENT: No visual disturbances, difficult swallowing  GI: No nausea, vomiting  : No dysuria, hematuria  Endocrine: No thyroid disease, +DM  Musculoskeletal: MARCIAL x 4, no focal motor deficits  Skin: Intact, no rashes  Neuro: No headache, seizures  Psych: Currently with no depression, anxiety    Past Medical History:   Diagnosis Date    Diabetes mellitus ([facility] [address]     Hyperlipidemia     Hypertension        Patient Active Problem List   Diagnosis    Epidermal cyst of neck    Cardiac arrhythmia    Hypomagnesemia       No Known Allergies    Current Facility-Administered Medications   Medication Dose Route Frequency Provider Last Rate Last Admin    metoprolol tartrate (LOPRESSOR) tablet 100 mg  100 mg Oral Daily Roger Mcdowell MD   100 mg at 03/22/21 0840    ipratropium-albuterol (DUONEB) nebulizer solution 1 ampule  1 ampule Inhalation Q4H WA Carlene Calvo MD   1 ampule at 03/22/21 0959    metFORMIN (GLUCOPHAGE) tablet 500 mg  500 mg Oral BID WC Carlene Calvo MD   500 mg at 03/22/21 0840    pantoprazole (PROTONIX) tablet 40 mg  40 mg Oral QAM AC Carlene Calvo MD   40 mg at 03/22/21 4867    alogliptin (NESINA) tablet 12.5 mg  12.5 mg Oral Daily Carlene Calvo MD   12.5 mg at 03/22/21 1011    0.9 % sodium chloride infusion   Intravenous Continuous Kendell Hammond  mL/hr at 03/22/21 1055 Rate Change at 03/22/21 1055    perflutren lipid microspheres (DEFINITY) injection 1.65 mg  1.5 mL Intravenous ONCE PRN Prerna Sepulveda,         atorvastatin (LIPITOR) tablet 40 mg  40 mg Oral Nightly Prerna Sepulveda DO   40 mg at 03/21/21 2355    traZODone (DESYREL) tablet 50 mg  50 mg Oral Nightly Lori Robb MD   50 mg at 03/21/21 2219    HYDROcodone-acetaminophen (NORCO)  MG per tablet 1 tablet  1 tablet Oral TID PRN Lori Robb MD   1 tablet at 03/22/21 6375    sodium chloride flush 0.9 % injection 10 mL  10 mL Intravenous PRN BELGICA Carney        nicotine (NICODERM CQ) 21 MG/24HR 1 patch  1 patch Transdermal Daily Girish Carney   1 patch at 03/22/21 1884    lidocaine 4 % external patch 1 patch  1 patch Transdermal Daily Lori Robb MD   1 patch at 03/21/21 2220    melatonin disintegrating tablet 10 mg  10 mg Oral Nightly Lori Robb MD   10 mg at 03/21/21 2219       Social History     Socioeconomic History    Marital status:      Spouse name: Not on file    Number of children: Not on file    Years of education: Not on file    Highest education level: Not on file   Occupational History    Not on file   Social Needs    Financial resource strain: Not on file    Food insecurity     Worry: Not on file     Inability: Not on file   [employer] needs     Medical: Not on file     Non-medical: Not on file   Tobacco Use    Smoking status: Current Every Day Smoker     Packs/day: 0.50    Smokeless tobacco: Never Used   Substance and Sexual Activity    Alcohol use:  Yes     Alcohol/week: 5.0 standard drinks     Types: 5 Shots of liquor per week     Frequency: 4 or more times a week    Drug use: Never    Sexual activity: Not on file   Lifestyle    Physical activity     Days per week: Not on file     Minutes per session: Not on file    Stress: Not on file   Relationships    Social connections     Talks on phone: Not on file     Gets together: Not on file     Attends Alevism service: Not on file     Active member of club or organization: Not on file     Attends meetings of clubs or organizations: Not on file     Relationship status: Not on file    Intimate partner violence     Fear of current or ex partner: Not on file     Emotionally abused: Not on file     Physically abused: Not on file     Forced sexual activity: Not on file   Other Topics Concern    Not on file   Social History Narrative    Not on file       History reviewed. No pertinent family history. Physical Exam   BP (!) 83/46   Pulse 88   Temp 97.2 °F (36.2 °C)   Resp 16   Ht 5' 11\" (1.803 m)   Wt 241 lb 14.4 oz (109.7 kg)   SpO2 98%   BMI 33.74 kg/m²   Appearance: Awake, alert and oriented x 3, no acute respiratory distress  Skin: Intact, no rash  Head: Normocephalic, atraumatic  Eyes: EOMI, no conjunctival erythema  ENMT: No pharyngeal erythema, MMM, no rhinorrhea, no dentures  Neck: Supple, no elevated JVP, no carotid bruits  Lungs: Clear to auscultation bilaterally. No wheezes, rales, or rhonchi.   Cardiac: IRRR, no murmurs apparent  Abdomen: Soft, nontender, +bowel sounds  Extremities: Moves all extremities x 4, no lower extremity edema  Neurologic: No focal motor deficits apparent, normal mood and affect, alert and oriented x 3  CBC:   Lab Results   Component Value Date    WBC 10.5 03/22/2021    RBC 2.42 03/22/2021    HGB 9.0 03/22/2021    HCT 27.3 03/22/2021    .8 03/22/2021    MCH 37.2 03/22/2021    MCHC 33.0 03/22/2021    RDW 14.3 03/22/2021     03/22/2021    MPV 9.6 03/22/2021     BMP:   Lab Results   Component Value Date     03/22/2021    K 3.0 03/22/2021    CL 91 03/22/2021    CO2 27 03/22/2021    BUN 37 03/22/2021    LABALBU 3.0 03/22/2021    CREATININE 1.4 03/22/2021    CALCIUM 6.9 03/22/2021    GFRAA >60 03/22/2021    LABGLOM 52 03/22/2021     Magnesium:    Lab Results   Component Value Date    MG 1.6 03/22/2021     Cardiac Enzymes:   Lab Results   Component Value Date    TROPONINI 0.02 03/20/2021      PT/INR:    Lab Results -- K 3.0 (Mg 1.6)  - Keep K > 4 and Mg > 2  - Supplement K / will replace Mg    11.  ETOH abuse  - Counseled re: cessation    Ovidio Mace MD  [facility]) Cardiology

## 2021-03-22 NOTE — CARE COORDINATION
SOCIAL WORK / DISCHARGE PLANNING:  COVID negative 3/20. Sw met with pt at bedside. He reports to reside with dtr and 3 grandchildren, ( 7,11, &13) in 1 story home, 1/2 JACKIE. He has not been moving much the last year, has had multiple hospital stays at [facility] with no answers he says. He has fallen multiple times, does not use cane/ walker. He has home O2 but does not know what provider, he is currently room air and states he can't breathe through his nose anyway. No hx HHC or KAILA. Await PT/OT eval to aide with dc planning. PCP Dr Lindy Mckoy. Pt with ETOH use, did not discuss due to roommate in close proximity, will discuss further.                Electronically signed by AVERY Torres on 3/22/2021 at 3:28 PM

## 2021-03-22 NOTE — PROGRESS NOTES
Occupational Therapy  OCCUPATIONAL THERAPY INITIAL EVALUATION      Date:3/22/2021  Patient Name: Satya Eden  MRN: [id number]  : 1960  Room: [address] [place] [address]    Referring Provider: Lex Phelps MD    Evaluating OT: Ignacio Mejia, OTR/L #[id number]    AM-PAC Daily Activity Raw Score:     Recommended Placement: Subacute vs. Home with 24/7 assist if patient meets goals  Recommended Adaptive Equipment: shower chair    Diagnosis:   1. Hypomagnesemia    2. Other cardiac arrhythmia    3. COPD exacerbation (HCC)    4. Closed head injury, initial encounter    5. Syncope and collapse    6. Contusion, shoulder and upper arm, multiple sites, unspecified laterality, initial encounter    7. Closed fracture of multiple ribs of left side, initial encounter        Surgery: N/A      Pertinent Medical History:    Past Medical History:   Diagnosis Date    Diabetes mellitus ([facility] [address]     Hyperlipidemia     Hypertension       Precautions:  Falls, alarm refusal, low BP (will pass out)     Home Living: Pt lives with daughter in a 1 story home with 1 JACKIE with 0 rail(s). Bathroom setup: tub/shower   Equipment owned: None    Prior Level of Function: Independent with ADLs , Independent with IADLs; ambulated without AD  Driving: Not for a year  Occupation: Retired    Pain Level: Rib pain, Therapist facilitated repositioning to address pain.   Cognition: A&O: 4/4; Follows 2 step directions   Memory:  good   Sequencing:  Fair+   Problem solving:  Fair+   Judgement/safety:  Fair+     Functional Assessment:   Initial Eval Status  Date: 3/23/21 Treatment Status  Date: STGs = LTGs  Time frame: 5-7 days   Feeding Independent        Grooming Stand by Assist   standing Bilateral task   Independent    UB Dressing Minimal Assist     Independent    LB Dressing Minimal Assist   Steadying assist upon standing, pulling up waistband   Cuing on safety  Independent    Bathing Minimal Assist    Independent    Toileting Minimal Assist   Using commode in bathroom, steadying assist for clothing management. Pt able to complete hygiene. Independent    Bed Mobility  Supine to sit: Supervision   Sit to supine: Supervision     Supine to sit: Independent   Sit to supine: Independent    Functional Transfers Stand by Assist   Sit<>Stand  Bed, chair, commode  Mild unsteadiness  Cuing on pace and safety  Independent    Functional Mobility Minimal Assist   No AD, mild unsteadiness  However, pt reports passing out frequently due to low BP  To/from bathroom, seated rest break on the way back from bathroom  Independent    Balance Sitting:     Static:  good    Dynamic:good  Standing: fair+  Sitting:     Static:  good    Dynamic:good  Standing: good   Activity Tolerance Fair-  BP 96/51 Supine, 100/53 seated, 95/55 at end of session, supine. Fair+   Visual/  Perceptual WFL       Hand Dominance Not reported     Strength ROM Additional Info:    RUE  4+/5  WFL good  and wfl FMC/dexterity noted during ADL tasks       LUE 4+/5  WFL good  and wfl FMC/dexterity noted during ADL tasks       Hearing: WFL   Sensation:  No c/o numbness or tingling   Tone: WFL   Edema: None noted    Comments:   Nursing approved therapy session. Upon arrival, patient supine in bed and agreeable to OT session. Therapist educated pt on role of OT. At end of session, patient supine in bed with call light and phone within reach, all lines and tubes intact; nursing aware. Pt required extended time due to BP throughout session. Pt demonstrated fair+ understanding of education/techniques and decreased independence and safety during completion of ADL/functional transfer/mobility tasks. Pt would benefit from continued skilled OT to increase safety and independence with completion of ADL/IADL tasks for functional independence and quality of life.     Treatment:   Skilled occupational therapy services provided include instruction/training on safety and adapted techniques for completion of therapeutic activities, to improve motor endurance, ROM, and functional strength for ADLs/functional transfers  Therapeutic activities to facilitate/challenge dynamic balance, stand tolerance, fine motor dexterity/in-hand manipulation for increased independence with ADLs  Neuro-muscular re-education: facilitation of righting/equilibrium reactions, midline orientation, scapular stability/mobility, normalization of muscle tone, and facilitation of volitional active controled movement    Rehab Potential: Good for established goals     Patient / Family Goal: Return home      Patient and/or family were instructed on functional diagnosis, prognosis/goals and OT plan of care. Demonstrated fair+ understanding. Eval Complexity: Low      Time In: 1518  Time Out: 1547  Total Treatment Time: 23    Min Units   OT Eval Low 97165  X  1   OT Eval Medium 06718      OT Eval High 89912       OT Re-Eval [id number]       Therapeutic Ex 23109       Therapeutic Activities 29489  13 1    ADL/Self Care 66500  10 1    Orthotic Management 02905       Neuro Re-Ed 69987       Non-Billable Time          Evaluation Time includes thorough review of current medical information, gathering information on past medical history/social history and prior level of function, completion of standardized testing/informal observation of tasks, assessment of data and education on plan of care and goals.     Haley Carreon, OTR/L #[id number]

## 2021-03-22 NOTE — H&P
Department of Internal Medicine            CHIEF COMPLAINT:  Passed out and fracture ribs    HISTORY OF PRESENT ILLNESS:      This is a has been passing out a lot and pt hit concrete step and broke his ribs. Pt have been having increase sob on exertion. He can bare walk 30 feet and had to sit down because he could not breath. Pt has hx of copd and still smoking. He has hx of ? afib in past.  Ct scan of chest showed moderate cardiac calcifications. Cardiology consult due to his cardiac symptoms and frequent syncope and abn ct    PAST MEDICAL Hx:  Past Medical History:   Diagnosis Date    Diabetes mellitus ([address]     Hyperlipidemia     Hypertension        PAST SURGICAL Hx:   Past Surgical History:   Procedure Laterality Date    APPENDECTOMY      COLONOSCOPY      LUNG SURGERY      from scar tissue    NECK SURGERY N/A 2/5/2020    EXCISION POSTERIOR SOFT TISSUE NEOPLASM NECK (CPT 52969) performed by Paul Hartman MD at [address] Hx:  History reviewed. No pertinent family history. HOME MEDICATIONS:  Prior to Admission medications    Medication Sig Start Date End Date Taking?  Authorizing Provider   allopurinol (ZYLOPRIM) 300 MG tablet Take by mouth 4/23/18   Historical Provider, MD   amLODIPine-benazepril (LOTREL) 10-40 MG per capsule Take by mouth 10/23/18   Historical Provider, MD   metFORMIN (GLUCOPHAGE) 500 MG tablet Take by mouth 2 times daily (with meals)  4/23/18   Historical Provider, MD   metoprolol succinate (TOPROL XL) 50 MG extended release tablet Take by mouth 9/3/19   Historical Provider, MD   omeprazole (PRILOSEC) 40 MG delayed release capsule Take by mouth 4/28/15   Historical Provider, MD   JANUVIA 50 MG tablet TK 1 T PO QD 1/21/20   Historical Provider, MD   pravastatin (PRAVACHOL) 20 MG tablet TK 1 T PO QD 1/10/20   Historical Provider, MD   zolpidem (AMBIEN) 5 MG tablet  10/23/18   Historical Provider, MD Leon Nye ELLIPTA 62.5-25 MCG/INH AEPB inhaler INL 1 PUFF PO D 12/11/19 Historical Provider, MD   albuterol sulfate HFA (VENTOLIN HFA) 108 (90 Base) MCG/ACT inhaler Inhale 2 puffs into the lungs every 6 hours as needed for Wheezing 10/12/18   BELGICA Rodríguez   ibuprofen (IBU) 800 MG tablet Take 1 tablet by mouth every 8 hours as needed for Pain for 10 days. 6/4/13 6/14/13  Duglas Davies MD   ibuprofen (IBU) 800 MG tablet Take 1 tablet by mouth every 8 hours as needed for Pain for 7 days. 2/12/12 2/13/20  Lina Joseph DO       ALLERGIES:  Patient has no known allergies. SOCIAL Hx:  Social History     Socioeconomic History    Marital status:      Spouse name: Not on file    Number of children: Not on file    Years of education: Not on file    Highest education level: Not on file   Occupational History    Not on file   Social Needs    Financial resource strain: Not on file    Food insecurity     Worry: Not on file     Inability: Not on file    Transportation needs     Medical: Not on file     Non-medical: Not on file   Tobacco Use    Smoking status: Current Every Day Smoker     Packs/day: 0.50    Smokeless tobacco: Never Used   Substance and Sexual Activity    Alcohol use:  Yes     Alcohol/week: 5.0 standard drinks     Types: 5 Shots of liquor per week     Frequency: 4 or more times a week    Drug use: Never    Sexual activity: Not on file   Lifestyle    Physical activity     Days per week: Not on file     Minutes per session: Not on file    Stress: Not on file   Relationships    Social connections     Talks on phone: Not on file     Gets together: Not on file     Attends Restorationist service: Not on file     Active member of club or organization: Not on file     Attends meetings of clubs or organizations: Not on file     Relationship status: Not on file    Intimate partner violence     Fear of current or ex partner: Not on file     Emotionally abused: Not on file     Physically abused: Not on file     Forced sexual activity: Not on file   Other Topics Concern    Not on file   Social History Narrative    Not on file       ROS:  General:   Denies chills, fatigue, fever, malaise, night sweats or weight loss    Psychological:   Denies anxiety, disorientation or hallucinations    ENT:    Denies epistaxis, headaches, vertigo or visual changes    Cardiovascular:   Has  irregular heartbeats, and palpitations. Dyspnea with minimal exertion. Respiratory:   Plus shortness of breath, coughing, no sputum production, hemoptysis, or wheezing. No orthopnea. Gastrointestinal:   Denies nausea, vomiting, diarrhea, or constipation. Denies any abdominal pain. Denies change in bowel habits or stools. Genito-Urinary:    Denies any urgency, frequency, hematuria. Voiding without difficulty. Musculoskeletal:   Denies joint pain, joint stiffness, joint swelling or muscle pain    Neurology:    syncope    Derm:    Denies any rashes, ulcers, or excoriations. Denies bruising. Extremities:   Denies any lower extremity swelling or edema. PHYSICAL EXAM:  VITALS:  Vitals:    03/21/21 1945   BP: (!) 85/52   Pulse: 90   Resp: 18   Temp: 98.7 °F (37.1 °C)   SpO2: 96%         CONSTITUTIONAL:    Awake, alert, cooperative, no apparent distress, and appears stated age    EYES:    PERRL, EOMI, sclera clear, conjunctiva normal    ENT:    Normocephalic, atraumatic, sinuses nontender on palpation. External ears without lesions. Oral pharynx with moist mucus membranes. Tonsils without erythema or exudates. NECK:    Supple, symmetrical, trachea midline, no adenopathy, thyroid symmetric, not enlarged and no tenderness, skin normal, no bruits, no JVD    HEMATOLOGIC/LYMPHATICS:    No cervical lymphadenopathy and no supraclavicular lymphadenopathy    LUNGS:    Symmetric.  No increased work of breathing, good air exchange, clear to auscultation bilaterally, no wheezes, rhonchi, or rales,     CARDIOVASCULAR:    Normal apical impulse, regular rate and rhythm, normal S1 and S2, no S3 or S4, and no murmur noted    ABDOMEN:    No scars, normal bowel sounds, soft, non-distended, non-tender, no masses palpated, no hepatosplenomegaly, no rebound or guarding elicited on palpation     MUSCULOSKELETAL:    There is no redness, warmth, or swelling of the joints. Full range of motion noted. Motor strength is 5 out of 5 all extremities bilaterally. Tone is normal.    NEUROLOGIC:    Awake, alert, oriented to name, place and time. Cranial nerves II-XII are grossly intact. Motor is 5 out of 5 bilaterally. SKIN:    No bruising or bleeding. No redness, warmth, or swelling    EXTREMITIES:    Peripheral pulses present. No edema, cyanosis, or swelling. LABORATORY DATA:  CBC:   Lab Results   Component Value Date    WBC 13.3 03/21/2021    RBC 2.55 03/21/2021    HGB 9.8 03/21/2021    HCT 28.3 03/21/2021    .0 03/21/2021    MCH 38.4 03/21/2021    MCHC 34.6 03/21/2021    RDW 14.3 03/21/2021     03/21/2021    MPV 9.8 03/21/2021     CMP:    Lab Results   Component Value Date     03/21/2021    K 3.6 03/21/2021    CL 86 03/21/2021    CO2 25 03/21/2021    BUN 33 03/21/2021    CREATININE 1.7 03/21/2021    GFRAA 50 03/21/2021    LABGLOM 41 03/21/2021    GLUCOSE 144 03/21/2021    PROT 6.5 03/21/2021    LABALBU 3.6 03/21/2021    CALCIUM 7.3 03/21/2021    BILITOT 0.8 03/21/2021    ALKPHOS 173 03/21/2021    AST 36 03/21/2021    ALT 11 03/21/2021       ASSESSMENT:  Patient Active Problem List   Diagnosis    Epidermal cyst of neck    Cardiac arrhythmia    Hypomagnesemia   syncope  Copd  Rib fracture  Acute renal insufficiency  Type 2 diabetes    PLAN:  Hold lisinopril, ivf, cardiology consult. carotid us. Pain management for rib fracture.     Dylon Nogueira M.D.  10:01 PM  3/21/2021

## 2021-03-22 NOTE — PROGRESS NOTES
Patient ID:  Eunice Hobson  [id number]  61 y.o.  1960    HPI:  Patient has been drinking alcohol daily basis, patient has been falling frequently, known history of multifocal atrial tachycardia, depression, anxiety disorder and chronic alcohol abuse, patient took all his zolpidem in 1 day according to daughter. Patient at present more awake cooperative. . Questions, answers, and tests reviewed. ROS:  Cardiovascular:   Denies any chest pain, irregular heartbeats, or palpitations. Respiratory:   Denies shortness of breath, coughing, sputum production, hemoptysis, or wheezing. Gastrointestinal:   Denies nausea, vomiting, diarrhea, or constipation. Denies any abdominal pain. Extremities:   Denies any lower extremity swelling or edema. Neurology:    Denies any headache or focal neurological deficits. No weakness or paresthesia. Derm:    Denies any rashes, ulcers, or excoriations. Denies bruising. Genitourinary:    Denies any urgency, frequency, hematuria. Voiding without difficulty. Physical Exam:    Vitals:    03/22/21 0800   BP: (!) 92/54   Pulse: 103   Resp: 16   Temp: 98.2 °F (36.8 °C)   SpO2:        HEENT:  PERRLA. EOMI. Sclera clear. Buccal mucosa moist.    Neck:  Supple. Trachea midline. No thyromegaly. No JVD. No bruits. Heart:  Rhythm regular, rate controlled. No murmurs. Lungs:  Symmetrical. Clear to auscultation bilaterally. No wheezes. No rhonchi. No rales. Abdomen: Soft. Non-tender. Non-distended. Bowel sounds positive. No organomegaly or masses. No pain on palpation    Extremities:  Peripheral pulses present. No peripheral edema. No ulcers. Neurologic:  Alert x 3. No focal deficit. Cranial nerves grossly intact. Skin:  No petechia. No hemorrhage. No wounds.     Labs:  CBC:   Lab Results   Component Value Date    WBC 10.5 03/22/2021    RBC 2.42 03/22/2021    HGB 9.0 03/22/2021    HCT 27.3 03/22/2021    .8 03/22/2021    MCH 37.2 03/22/2021    MCHC 33.0 03/22/2021    RDW 14.3 03/22/2021     03/22/2021    MPV 9.6 03/22/2021     CMP:    Lab Results   Component Value Date     03/22/2021    K 3.0 03/22/2021    CL 91 03/22/2021    CO2 27 03/22/2021    BUN 37 03/22/2021    CREATININE 1.4 03/22/2021    GFRAA >60 03/22/2021    LABGLOM 52 03/22/2021    GLUCOSE 109 03/22/2021    PROT 5.7 03/22/2021    LABALBU 3.0 03/22/2021    CALCIUM 6.9 03/22/2021    BILITOT 0.6 03/22/2021    ALKPHOS 149 03/22/2021    AST 32 03/22/2021    ALT 10 03/22/2021     PT/INR:    Lab Results   Component Value Date    PROTIME 14.1 03/20/2021    INR 1.2 03/20/2021         US CAROTID ARTERY BILATERAL   Final Result   The right internal carotid artery demonstrates 0-50% stenosis. The left internal carotid artery demonstrates 0-50% stenosis. Bilateral vertebral arteries are patent with flow in the normal direction. CT HEAD WO CONTRAST   Final Result   1.   1.  There is no acute intracranial abnormality. Specifically, there is   no intracranial hemorrhage. 2. Age-appropriate atrophy and periventricular leukomalacia,   3. Chronic bilateral maxillary sinusitis         CT CHEST WO CONTRAST   Final Result   Old healed fractures of the 5th and 6th ribs on the left with no acute   fracture. Chronic obstructive lung changes with mild subsegmental atelectasis or   scarring scattered along the left lung base anteriorly and posteriorly with   associated pleural thickening posteriorly. Suggest follow-up with chest   x-rays to assure stability      Mildly atherosclerotic thoracic aorta with no aneurysm or dissection and   unremarkable mediastinum. Moderate coronary artery calcifications, suggest cardiology follow-up      4 cm right adrenal mass which may represent an adenoma but remains   indeterminate. Suggest a follow-up CT scan or MRI of the abdomen for further   characterization.       Mild hepatomegaly and chronic liver changes with fatty replacement throughout Probable gynecomastia bilaterally. Other Data:      Intake/Output Summary (Last 24 hours) at 3/22/2021 0812  Last data filed at 3/22/2021 0614  Gross per 24 hour   Intake 2048. 75 ml   Output --   Net 2048. 75 ml         Scheduled Medications:   metoprolol tartrate  100 mg Oral Daily    ipratropium-albuterol  1 ampule Inhalation Q4H WA    metFORMIN  500 mg Oral BID WC    pantoprazole  40 mg Oral QAM AC    alogliptin  12.5 mg Oral Daily    atorvastatin  40 mg Oral Nightly    traZODone  50 mg Oral Nightly    nicotine  1 patch Transdermal Daily    lidocaine  1 patch Transdermal Daily    melatonin  10 mg Oral Nightly         Infusion Medications:   sodium chloride 75 mL/hr at 03/22/21 0003       Assessment:   Patient Active Problem List    Diagnosis Date Noted    Hypomagnesemia     Cardiac arrhythmia 03/20/2021    Epidermal cyst of neck          Plan: Repeated falls secondary to chronic alcohol abuse and probable folic acid and nutritional deficiency with polyneuropathy and alcoholic Wernicke's encephalopathy continue current therapy, psych consult. 2.  Low blood pressure, discontinue amlodipine,   continue IV hydration. 3.  COPD and tobacco abuse continue aerosol treatment, nicotine patches. 4.  Prediabetes continue Metformin. See orders for further plan of care.     Completed By:  Dr. Enmanuel Croft MD, [address]  8:12 AM  3/22/2021      Electronically signed by Renard Ramon MD on 3/22/21 at 8:12 AM EDT

## 2021-03-22 NOTE — PROGRESS NOTES
Physical Therapy Treatment Note    Room #:  5507/2374-03  Patient Name: Alex Williamson  YOB: 1960  MRN: [id number]    Referring Provider: Jaskaran Guerrero MD       Date of Service: 3/22/2021    Evaluating Physical Therapist: Dayne Lynch, PT  #[id number]       Diagnosis:   Cardiac arrhythmia [I49.9]    admitted to hospital with multiple falls over past few weeks with rib pain/fracture  on left, shortness of breath , closed head injury      Patient Active Problem List   Diagnosis    Epidermal cyst of neck    Cardiac arrhythmia    Hypomagnesemia        Tentative placement recommendation: Subacute vs Home Health Physical Therapy if patient meets goals    Equipment recommendation:  To be determined      Prior Level of Function: Patient ambulated independently    Rehab Potential: good    for baseline    Past medical history:   Past Medical History:   Diagnosis Date    Diabetes mellitus ([address]     Hyperlipidemia     Hypertension      Past Surgical History:   Procedure Laterality Date    APPENDECTOMY      COLONOSCOPY      LUNG SURGERY      from scar tissue    NECK SURGERY N/A 2/5/2020    EXCISION POSTERIOR SOFT TISSUE NEOPLASM NECK (CPT 92834) performed by Kristine Hernandes MD at [address]       Precautions: , falls and alarm , irregular heart rate    SUBJECTIVE:    Social history: Patient lives with daughter   in a ranch home  with 1 step  to enter without Rail  Tub shower     Equipment owned: None,       [address]   How much difficulty turning over in bed?: None  How much difficulty sitting down on / standing up from a chair with arms?: A Little  How much difficulty moving from lying on back to sitting on side of bed?: A Little  How much help from another person moving to and from a bed to a chair?: A Little  How much help from another person needed to walk in hospital room?: A Little  How much help from another person for climbing 3-5 steps with a railing?: A Lot  AM-PAC Inpatient Mobility Raw Score : 18  AM-PAC Inpatient T-Scale Score : 43.63  Mobility Inpatient CMS 0-100% Score: 46.58  Mobility Inpatient CMS G-Code Modifier : CK    Nursing cleared patient for PT treatment. OBJECTIVE;   Initial Evaluation  Date: 3/21/2021 Treatment Date:  3/22/2021       Short Term/ Long Term   Goals   Was pt agreeable to Eval/treatment? Yes   yes To be met in 3 days   Pain level   10/10  left ribs 10/10  Left ribs    Bed Mobility    Rolling: Minimal assist of 1    Supine to sit: Minimal assist of 1    Sit to supine: Minimal assist of 1    Scooting: Minimal assist of 1   Rolling: Supervision    Supine to sit: Supervision    Sit to supine: Supervision    Scooting: Supervision     Rolling: Independent    Supine to sit: Independent    Sit to supine: Independent    Scooting: Independent     Transfers Sit to stand: Minimal assist of 1   Sit to stand: Minimal assist of 1 cues for hand placement     Sit to stand: Independent     Ambulation    4 steps using  hand held assist with Minimal assist of 1   with dizziness; hr  during all levels of mobility 10 feet to the chair; 6 feet to the restroom; 20 feet using  IV pole with Minimal assist of 1   V/C for pacing and safety.     50 feet using  no device with Independent    Stair negotiation: ascended and descended   Not assessed       1 step independent    ROM Within functional limits with exception of Left upper extremitiy d/t painful ribs and mm spasms    Increase range of motion 10% of affected joints    Strength RUE:  3+/5     LUE:  2/5  RLE:  4/5  LLE:  4/5   Increase strength in affected mm groups by 1/3 grade   Balance Sitting EOB:  fair    Dynamic Standing:  fair   Sitting EOB: fair   Dynamic Standing: fair    Sitting EOB:  good    Dynamic Standing: good       Patient is Alert & Oriented x person, place, time and situation and follows directions    Sensation:  Patient  denies numbness and tingling     Edema:  yes left upper extremity    Endurance: poor d/t minimal sleep d/t pain and spasm and need to change positions every 15 min       Patient education  Patient educated on role of Physical Therapy, risks of immobility, safety and plan of care,  importance of mobility while in hospital  and spinting of ribs during movement        Patient response to education:   Pt verbalized understanding Pt demonstrated skill Pt requires further education in this area   Yes Partial Yes      Treatment:  Patient practiced and was instructed/facilitated in the following treatment: Patient transferred to edge of the bed. Pt  Sat edge of bed 5 minutes with Supervision  to increase dynamic sitting balance and activity tolerance. Pt stood and ambulated to the chair outside the restroom. Pt rested for 3 minutes, stood, ambulated to the restroom. Pt stood and ambulated back to the bed. Pt returned to supine. Therapeutic Exercises:  not performed      At end of session, patient in bed with alarm call light and phone within reach,   all lines and tubes intact, nursing notified. Patient would benefit from continued skilled Physical Therapy to improve functional independence and quality of life. Patient's/ family goals   home        ASSESSMENT: Patient exhibits decreased strength, balance, endurance, range of motion and pain left ribs impairing functional mobility, transfers, gait , tolerance to activity and participation are barriers to d/c and require skilled intervention during hospital stay. Patient displays impaired strength,endurance, and safety awareness; all factors that increase his risk for falls. Pt needed a seated rest period on his way to the restroom.         Plan of Care:     -Bed Mobility: Lower extremity exercises   -Sitting Balance: Incorporate reaching activities to activate trunk muscles   -Standing Balance: Perform strengthening exercises in standing to promote motor control with or without upper extremity support -Transfers: Provide instruction on proper hand and foot position for adequate transfer of weight onto lower extremities and use of gait device  -Gait: Gait training and Standing activities to improve: base of support, weight shift, weight bearing    -Endurance: Utilize Supervised activities to increase level of endurance to allow for safe functional mobility including transfers and gait     Patient and or family understand(s) diagnosis, prognosis, and plan of care. Frequency of treatments: Patient will be seen  daily. Time in  8:20  Time out 8:35    Total Treatment Time  15 minutes        CPT codes:    Therapeutic activities (32375)   15 minutes  1 unit(s)     Spencer Cobian  [facility]  LIC # [id number]

## 2021-03-22 NOTE — PLAN OF CARE
Problem: Falls - Risk of:  Goal: Will remain free from falls  Description: Will remain free from falls  Outcome: Met This Shift  Goal: Absence of physical injury  Description: Absence of physical injury  Outcome: Met This Shift     Problem: Pain:  Goal: Control of chronic pain  Description: Control of chronic pain  Outcome: Met This Shift     Problem: Skin Integrity:  Goal: Will show no infection signs and symptoms  Description: Will show no infection signs and symptoms  Outcome: Met This Shift  Goal: Absence of new skin breakdown  Description: Absence of new skin breakdown  Outcome: Met This Shift     Problem: Pain:  Goal: Pain level will decrease  Description: Pain level will decrease  Outcome: Not Met This Shift  Note: Pt continues to have c/o pain  Goal: Control of acute pain  Description: Control of acute pain  Outcome: Not Met This Shift  Note: Pt continues to have c/o pain

## 2021-03-23 LAB
ALBUMIN SERPL-MCNC: 3.1 G/DL (ref 3.5–5.2)
ALP BLD-CCNC: 140 U/L (ref 40–129)
ALT SERPL-CCNC: 10 U/L (ref 0–40)
ANION GAP SERPL CALCULATED.3IONS-SCNC: 10 MMOL/L (ref 7–16)
AST SERPL-CCNC: 47 U/L (ref 0–39)
BILIRUB SERPL-MCNC: 0.6 MG/DL (ref 0–1.2)
BUN BLDV-MCNC: 20 MG/DL (ref 8–23)
CALCIUM SERPL-MCNC: 7 MG/DL (ref 8.6–10.2)
CHLORIDE BLD-SCNC: 98 MMOL/L (ref 98–107)
CO2: 26 MMOL/L (ref 22–29)
CREAT SERPL-MCNC: 0.8 MG/DL (ref 0.7–1.2)
GFR AFRICAN AMERICAN: >60
GFR NON-AFRICAN AMERICAN: >60 ML/MIN/1.73
GLUCOSE BLD-MCNC: 113 MG/DL (ref 74–99)
LV EF: 60 %
LVEF MODALITY: NORMAL
MAGNESIUM: 1.6 MG/DL (ref 1.6–2.6)
POTASSIUM SERPL-SCNC: 3.5 MMOL/L (ref 3.5–5)
SODIUM BLD-SCNC: 134 MMOL/L (ref 132–146)
TOTAL PROTEIN: 5.5 G/DL (ref 6.4–8.3)

## 2021-03-23 PROCEDURE — 6370000000 HC RX 637 (ALT 250 FOR IP): Performed by: INTERNAL MEDICINE

## 2021-03-23 PROCEDURE — 83735 ASSAY OF MAGNESIUM: CPT

## 2021-03-23 PROCEDURE — 6370000000 HC RX 637 (ALT 250 FOR IP): Performed by: PHYSICIAN ASSISTANT

## 2021-03-23 PROCEDURE — 99233 SBSQ HOSP IP/OBS HIGH 50: CPT | Performed by: INTERNAL MEDICINE

## 2021-03-23 PROCEDURE — C8929 TTE W OR WO FOL WCON,DOPPLER: HCPCS

## 2021-03-23 PROCEDURE — 36415 COLL VENOUS BLD VENIPUNCTURE: CPT

## 2021-03-23 PROCEDURE — 6360000004 HC RX CONTRAST MEDICATION: Performed by: INTERNAL MEDICINE

## 2021-03-23 PROCEDURE — 94640 AIRWAY INHALATION TREATMENT: CPT

## 2021-03-23 PROCEDURE — 80053 COMPREHEN METABOLIC PANEL: CPT

## 2021-03-23 PROCEDURE — 97530 THERAPEUTIC ACTIVITIES: CPT

## 2021-03-23 PROCEDURE — 2580000003 HC RX 258: Performed by: INTERNAL MEDICINE

## 2021-03-23 PROCEDURE — 2060000000 HC ICU INTERMEDIATE R&B

## 2021-03-23 PROCEDURE — 6360000002 HC RX W HCPCS: Performed by: INTERNAL MEDICINE

## 2021-03-23 RX ORDER — METOPROLOL SUCCINATE 100 MG/1
100 TABLET, EXTENDED RELEASE ORAL 2 TIMES DAILY
Status: DISCONTINUED | OUTPATIENT
Start: 2021-03-23 | End: 2021-03-25 | Stop reason: HOSPADM

## 2021-03-23 RX ORDER — POTASSIUM CHLORIDE 20 MEQ/1
40 TABLET, EXTENDED RELEASE ORAL ONCE
Status: COMPLETED | OUTPATIENT
Start: 2021-03-23 | End: 2021-03-23

## 2021-03-23 RX ORDER — DIGOXIN 0.25 MG/ML
250 INJECTION INTRAMUSCULAR; INTRAVENOUS EVERY 6 HOURS
Status: COMPLETED | OUTPATIENT
Start: 2021-03-23 | End: 2021-03-23

## 2021-03-23 RX ORDER — MAGNESIUM SULFATE IN WATER 40 MG/ML
2000 INJECTION, SOLUTION INTRAVENOUS ONCE
Status: COMPLETED | OUTPATIENT
Start: 2021-03-23 | End: 2021-03-23

## 2021-03-23 RX ADMIN — HYDROCODONE BITARTRATE AND ACETAMINOPHEN 1 TABLET: 10; 325 TABLET ORAL at 15:49

## 2021-03-23 RX ADMIN — Medication 10 MG: at 23:26

## 2021-03-23 RX ADMIN — MAGNESIUM SULFATE HEPTAHYDRATE 2000 MG: 40 INJECTION, SOLUTION INTRAVENOUS at 13:16

## 2021-03-23 RX ADMIN — IPRATROPIUM BROMIDE AND ALBUTEROL SULFATE 1 AMPULE: .5; 3 SOLUTION RESPIRATORY (INHALATION) at 07:02

## 2021-03-23 RX ADMIN — PERFLUTREN 2.2 MG: 6.52 INJECTION, SUSPENSION INTRAVENOUS at 08:29

## 2021-03-23 RX ADMIN — METFORMIN HYDROCHLORIDE 500 MG: 500 TABLET ORAL at 18:08

## 2021-03-23 RX ADMIN — ATORVASTATIN CALCIUM 40 MG: 40 TABLET, FILM COATED ORAL at 23:25

## 2021-03-23 RX ADMIN — PANTOPRAZOLE SODIUM 40 MG: 40 TABLET, DELAYED RELEASE ORAL at 06:24

## 2021-03-23 RX ADMIN — IPRATROPIUM BROMIDE AND ALBUTEROL SULFATE 1 AMPULE: .5; 3 SOLUTION RESPIRATORY (INHALATION) at 09:52

## 2021-03-23 RX ADMIN — METFORMIN HYDROCHLORIDE 500 MG: 500 TABLET ORAL at 09:29

## 2021-03-23 RX ADMIN — DIGOXIN 250 MCG: 250 INJECTION, SOLUTION INTRAMUSCULAR; INTRAVENOUS; PARENTERAL at 13:16

## 2021-03-23 RX ADMIN — IPRATROPIUM BROMIDE AND ALBUTEROL SULFATE 1 AMPULE: .5; 3 SOLUTION RESPIRATORY (INHALATION) at 20:40

## 2021-03-23 RX ADMIN — SODIUM CHLORIDE: 9 INJECTION, SOLUTION INTRAVENOUS at 18:54

## 2021-03-23 RX ADMIN — METOPROLOL SUCCINATE 100 MG: 100 TABLET, FILM COATED, EXTENDED RELEASE ORAL at 23:26

## 2021-03-23 RX ADMIN — SODIUM CHLORIDE: 9 INJECTION, SOLUTION INTRAVENOUS at 00:29

## 2021-03-23 RX ADMIN — TRAZODONE HYDROCHLORIDE 50 MG: 50 TABLET ORAL at 23:25

## 2021-03-23 RX ADMIN — POTASSIUM CHLORIDE 40 MEQ: 1500 TABLET, EXTENDED RELEASE ORAL at 13:16

## 2021-03-23 RX ADMIN — SODIUM CHLORIDE: 9 INJECTION, SOLUTION INTRAVENOUS at 09:36

## 2021-03-23 RX ADMIN — DIGOXIN 250 MCG: 250 INJECTION, SOLUTION INTRAMUSCULAR; INTRAVENOUS; PARENTERAL at 18:53

## 2021-03-23 RX ADMIN — HYDROCODONE BITARTRATE AND ACETAMINOPHEN 1 TABLET: 10; 325 TABLET ORAL at 00:29

## 2021-03-23 RX ADMIN — HYDROCODONE BITARTRATE AND ACETAMINOPHEN 1 TABLET: 10; 325 TABLET ORAL at 09:28

## 2021-03-23 RX ADMIN — ALOGLIPTIN 12.5 MG: 12.5 TABLET, FILM COATED ORAL at 09:28

## 2021-03-23 RX ADMIN — IPRATROPIUM BROMIDE AND ALBUTEROL SULFATE 1 AMPULE: .5; 3 SOLUTION RESPIRATORY (INHALATION) at 15:57

## 2021-03-23 RX ADMIN — METOPROLOL TARTRATE 100 MG: 50 TABLET, FILM COATED ORAL at 09:29

## 2021-03-23 ASSESSMENT — PAIN DESCRIPTION - ORIENTATION: ORIENTATION: LEFT

## 2021-03-23 ASSESSMENT — PAIN DESCRIPTION - PROGRESSION: CLINICAL_PROGRESSION: GRADUALLY WORSENING

## 2021-03-23 ASSESSMENT — PAIN SCALES - GENERAL: PAINLEVEL_OUTOF10: 9

## 2021-03-23 ASSESSMENT — PAIN DESCRIPTION - LOCATION: LOCATION: RIB CAGE

## 2021-03-23 ASSESSMENT — PAIN DESCRIPTION - PAIN TYPE: TYPE: ACUTE PAIN

## 2021-03-23 NOTE — PROGRESS NOTES
CHIEF COMPLAINT: Follow-up for palpitations, atrial fibrillation, coronary calcifications    Date of Service: 3/23/2021    HISTORY OF PRESENT ILLNESS:  Patient presented with falls. Current telemetry demonstrates atrial fibrillation, rate 110    No CP or angina. Chronic HENDERSON / COPD. No new overnight cardiac complaints.     Review of Systems:  Cardiac: As per HPI  General: No fever, chills  Pulmonary: As per HPI  HEENT: No visual disturbances, difficult swallowing  GI: No nausea, vomiting  : No dysuria, hematuria  Endocrine: No thyroid disease, +DM  Musculoskeletal: MARCIAL x 4, no focal motor deficits  Skin: Intact, no rashes  Neuro: No headache, seizures  Psych: Currently with no depression, anxiety    Past Medical History:   Diagnosis Date    Diabetes mellitus ([facility] [address]     Hyperlipidemia     Hypertension        Patient Active Problem List   Diagnosis    Epidermal cyst of neck    Cardiac arrhythmia    Hypomagnesemia       No Known Allergies    Current Facility-Administered Medications   Medication Dose Route Frequency Provider Last Rate Last Admin    metoprolol tartrate (LOPRESSOR) tablet 100 mg  100 mg Oral Daily Roger Juani Dorado MD   100 mg at 03/23/21 0929    ipratropium-albuterol (DUONEB) nebulizer solution 1 ampule  1 ampule Inhalation Q4H WA Calin Wagner MD   1 ampule at 03/23/21 3530    metFORMIN (GLUCOPHAGE) tablet 500 mg  500 mg Oral BID WC Calin Wagner MD   500 mg at 03/23/21 0929    pantoprazole (PROTONIX) tablet 40 mg  40 mg Oral QAM AC Calin Wagner MD   40 mg at 03/23/21 8898    alogliptin (NESINA) tablet 12.5 mg  12.5 mg Oral Daily Calin Wagner MD   12.5 mg at 03/23/21 8185    0.9 % sodium chloride infusion   Intravenous Continuous Ovidio Mace  mL/hr at 03/23/21 0936 New Bag at 03/23/21 0936    atorvastatin (LIPITOR) tablet 40 mg  40 mg Oral Nightly Trina Dewitt DO   40 mg at 03/22/21 2146    traZODone (DESYREL) tablet 50 mg  50 mg Oral Nightly Fahad Mike abused: Not on file     Forced sexual activity: Not on file   Other Topics Concern    Not on file   Social History Narrative    Not on file       History reviewed. No pertinent family history. Physical Exam   BP (!) 111/59   Pulse 101   Temp 97.9 °F (36.6 °C) (Oral)   Resp 18   Ht 5' 11\" (1.803 m)   Wt 241 lb 14.4 oz (109.7 kg)   SpO2 97%   BMI 33.74 kg/m²   Appearance: Awake, alert and oriented x 3, no acute respiratory distress  Skin: Intact, no rash  Head: Normocephalic, atraumatic  Eyes: EOMI, no conjunctival erythema  ENMT: No pharyngeal erythema, MMM, no rhinorrhea, no dentures  Neck: Supple, no elevated JVP, no carotid bruits  Lungs: Clear to auscultation bilaterally. No wheezes, rales, or rhonchi.   Cardiac: IRRR, no murmurs apparent  Abdomen: Soft, nontender, +bowel sounds  Extremities: Moves all extremities x 4, no lower extremity edema  Neurologic: No focal motor deficits apparent, normal mood and affect, alert and oriented x 3  CBC:   Lab Results   Component Value Date    WBC 10.5 03/22/2021    RBC 2.42 03/22/2021    HGB 9.0 03/22/2021    HCT 27.3 03/22/2021    .8 03/22/2021    MCH 37.2 03/22/2021    MCHC 33.0 03/22/2021    RDW 14.3 03/22/2021     03/22/2021    MPV 9.6 03/22/2021     BMP:   Lab Results   Component Value Date     03/23/2021    K 3.5 03/23/2021    K 3.0 03/22/2021    CL 98 03/23/2021    CO2 26 03/23/2021    BUN 20 03/23/2021    LABALBU 3.1 03/23/2021    CREATININE 0.8 03/23/2021    CALCIUM 7.0 03/23/2021    GFRAA >60 03/23/2021    LABGLOM >60 03/23/2021     Magnesium:    Lab Results   Component Value Date    MG 1.6 03/23/2021     Cardiac Enzymes:   Lab Results   Component Value Date    TROPONINI 0.02 03/20/2021      PT/INR:    Lab Results   Component Value Date    PROTIME 14.1 03/20/2021    INR 1.2 03/20/2021     TSH:    Lab Results   Component Value Date    TSH 1.660 09/03/2019     Telemetry (3/23/2021): atrial fibrillation, rate 110    EKG:  nonspecific ST and T waves changes, atrial fibrillation. CT chest without contrast 3/20/2021:  Old healed fractures of the 5th and 6th ribs on the left with no acute   fracture.       Chronic obstructive lung changes with mild subsegmental atelectasis or   scarring scattered along the left lung base anteriorly and posteriorly with   associated pleural thickening posteriorly.  Suggest follow-up with chest   x-rays to assure stability       Mildly atherosclerotic thoracic aorta with no aneurysm or dissection and   unremarkable mediastinum.       Moderate coronary artery calcifications, suggest cardiology follow-up       4 cm right adrenal mass which may represent an adenoma but remains   indeterminate.  Suggest a follow-up CT scan or MRI of the abdomen for further   characterization.       Mild hepatomegaly and chronic liver changes with fatty replacement throughout       Probable gynecomastia bilaterally. Echocardiogram: 3/23/21   Normal left ventricular systolic function. Ejection fraction is visually estimated at 60%. Mild concentric left ventricular hypertrophy. Normal right ventricular size and function (TAPSE 2.2 cm). Indeterminate diastolic function. Mildly dilated left atrium by volume index. Physiologic and/or trace tricuspid regurgitation. ASSESSMENT AND PLAN:  Patient Active Problem List   Diagnosis    Epidermal cyst of neck    Cardiac arrhythmia    Hypomagnesemia     1. Palpitations/persistent atrial fibrillation    Echocardiogram results outlined above    Will switch BB to Toprol XL / intermittent hypotension (stopped norvasc this admission / ACE-I on hold)    Will order IV digoxin for today    Once H/H stabilizes and if no contraindications, recommending adding NOAC    If able to anticoagulate uninterrupted, discussed option of JORGE/CVN    2. Coronary calcifications: No CP or angina. Medically manage for now. 3. Multiple falls    4. ANALI: Hold ACEI.   - prior Cr ~ 1.0-1.2 --> 1.7 on admission --> 1.4 --> 0.8    5. Ongoing tobacco abuse / COPD  - Counseled re: cessation    6. HTN  - Intermittent hypotension  - As per #1    7. Lipids: Statin. 8. DM: Per primary service. 9. Anemia -- Hgb 17.3 in 2/2020 --> 11.1 on this admission --> 9.8 --> 9.0  - Repeat CBC    10. Hypokalemia -- K 3.0 (Mg 1.6)  - Keep K > 4 and Mg > 2  - Will replace K / will replace Mg again    11.  ETOH abuse  - Counseled re: cessation    Kinga Kemp MD  [facility]) Cardiology

## 2021-03-23 NOTE — PROGRESS NOTES
Physical Therapy Treatment Note    Room #:  6784/4888-77  Patient Name: Teodora Tomlinson  YOB: 1960  MRN: [id number]    Referring Provider: Carol Diehl MD       Date of Service: 3/23/2021    Evaluating Physical Therapist: Cheryl Santillan, PT  #[id number]       Diagnosis:   Cardiac arrhythmia [I49.9]    admitted to hospital with multiple falls over past few weeks with rib pain/fracture  on left, shortness of breath , closed head injury      Patient Active Problem List   Diagnosis    Epidermal cyst of neck    Cardiac arrhythmia    Hypomagnesemia        Tentative placement recommendation: Subacute vs Home Health Physical Therapy if patient meets goals    Equipment recommendation:  To be determined      Prior Level of Function: Patient ambulated independently    Rehab Potential: good    for baseline    Past medical history:   Past Medical History:   Diagnosis Date    Diabetes mellitus ([facility] [address]     Hyperlipidemia     Hypertension      Past Surgical History:   Procedure Laterality Date    APPENDECTOMY      COLONOSCOPY      LUNG SURGERY      from scar tissue    NECK SURGERY N/A 2/5/2020    EXCISION POSTERIOR SOFT TISSUE NEOPLASM NECK (CPT 18756) performed by Blas Gonzalez MD at [address]       Precautions: , falls and alarm , irregular heart rate    SUBJECTIVE:    Social history: Patient lives with daughter   in a ranch home  with 1 step  to enter without Rail  Tub shower     Equipment owned: None,       [address]   How much difficulty turning over in bed?: None  How much difficulty sitting down on / standing up from a chair with arms?: A Little  How much difficulty moving from lying on back to sitting on side of bed?: None  How much help from another person moving to and from a bed to a chair?: A Little  How much help from another person needed to walk in hospital room?: A Little  How much help from another person for climbing 3-5 steps with a -Transfers: Provide instruction on proper hand and foot position for adequate transfer of weight onto lower extremities and use of gait device  -Gait: Gait training and Standing activities to improve: base of support, weight shift, weight bearing    -Endurance: Utilize Supervised activities to increase level of endurance to allow for safe functional mobility including transfers and gait     Patient and or family understand(s) diagnosis, prognosis, and plan of care. Frequency of treatments: Patient will be seen  daily. Time in  2:44  Time out 3:07    Total Treatment Time  23 minutes        CPT codes:    Therapeutic activities (91345)   23 minutes  2 unit(s)     Crystal Cobian  [facility]  LIC # [id number]

## 2021-03-24 LAB
ALBUMIN SERPL-MCNC: 2.9 G/DL (ref 3.5–5.2)
ALP BLD-CCNC: 169 U/L (ref 40–129)
ALT SERPL-CCNC: 13 U/L (ref 0–40)
ANION GAP SERPL CALCULATED.3IONS-SCNC: 10 MMOL/L (ref 7–16)
AST SERPL-CCNC: 42 U/L (ref 0–39)
BILIRUB SERPL-MCNC: 0.5 MG/DL (ref 0–1.2)
BUN BLDV-MCNC: 11 MG/DL (ref 8–23)
CALCIUM SERPL-MCNC: 7.1 MG/DL (ref 8.6–10.2)
CHLORIDE BLD-SCNC: 98 MMOL/L (ref 98–107)
CO2: 26 MMOL/L (ref 22–29)
CREAT SERPL-MCNC: 0.7 MG/DL (ref 0.7–1.2)
GFR AFRICAN AMERICAN: >60
GFR NON-AFRICAN AMERICAN: >60 ML/MIN/1.73
GLUCOSE BLD-MCNC: 115 MG/DL (ref 74–99)
HCT VFR BLD CALC: 25.4 % (ref 37–54)
HEMOGLOBIN: 8.3 G/DL (ref 12.5–16.5)
MCH RBC QN AUTO: 37.7 PG (ref 26–35)
MCHC RBC AUTO-ENTMCNC: 32.7 % (ref 32–34.5)
MCV RBC AUTO: 115.5 FL (ref 80–99.9)
PDW BLD-RTO: 14.4 FL (ref 11.5–15)
PLATELET # BLD: 178 E9/L (ref 130–450)
PMV BLD AUTO: 10 FL (ref 7–12)
POTASSIUM REFLEX MAGNESIUM: 3.9 MMOL/L (ref 3.5–5)
RBC # BLD: 2.2 E12/L (ref 3.8–5.8)
SODIUM BLD-SCNC: 134 MMOL/L (ref 132–146)
TOTAL PROTEIN: 5.5 G/DL (ref 6.4–8.3)
WBC # BLD: 7.5 E9/L (ref 4.5–11.5)

## 2021-03-24 PROCEDURE — 2580000003 HC RX 258: Performed by: PHYSICIAN ASSISTANT

## 2021-03-24 PROCEDURE — 36415 COLL VENOUS BLD VENIPUNCTURE: CPT

## 2021-03-24 PROCEDURE — 85027 COMPLETE CBC AUTOMATED: CPT

## 2021-03-24 PROCEDURE — 94640 AIRWAY INHALATION TREATMENT: CPT

## 2021-03-24 PROCEDURE — 2060000000 HC ICU INTERMEDIATE R&B

## 2021-03-24 PROCEDURE — 99233 SBSQ HOSP IP/OBS HIGH 50: CPT | Performed by: INTERNAL MEDICINE

## 2021-03-24 PROCEDURE — 6370000000 HC RX 637 (ALT 250 FOR IP): Performed by: INTERNAL MEDICINE

## 2021-03-24 PROCEDURE — 6370000000 HC RX 637 (ALT 250 FOR IP): Performed by: PHYSICIAN ASSISTANT

## 2021-03-24 PROCEDURE — 80053 COMPREHEN METABOLIC PANEL: CPT

## 2021-03-24 RX ORDER — DIGOXIN 125 MCG
125 TABLET ORAL DAILY
Status: DISCONTINUED | OUTPATIENT
Start: 2021-03-25 | End: 2021-03-25 | Stop reason: HOSPADM

## 2021-03-24 RX ADMIN — METFORMIN HYDROCHLORIDE 500 MG: 500 TABLET ORAL at 17:08

## 2021-03-24 RX ADMIN — METOPROLOL SUCCINATE 100 MG: 100 TABLET, FILM COATED, EXTENDED RELEASE ORAL at 23:32

## 2021-03-24 RX ADMIN — IPRATROPIUM BROMIDE AND ALBUTEROL SULFATE 1 AMPULE: .5; 3 SOLUTION RESPIRATORY (INHALATION) at 09:56

## 2021-03-24 RX ADMIN — IPRATROPIUM BROMIDE AND ALBUTEROL SULFATE 1 AMPULE: .5; 3 SOLUTION RESPIRATORY (INHALATION) at 06:12

## 2021-03-24 RX ADMIN — ALOGLIPTIN 12.5 MG: 12.5 TABLET, FILM COATED ORAL at 09:02

## 2021-03-24 RX ADMIN — METOPROLOL SUCCINATE 100 MG: 100 TABLET, FILM COATED, EXTENDED RELEASE ORAL at 09:02

## 2021-03-24 RX ADMIN — IPRATROPIUM BROMIDE AND ALBUTEROL SULFATE 1 AMPULE: .5; 3 SOLUTION RESPIRATORY (INHALATION) at 15:49

## 2021-03-24 RX ADMIN — TRAZODONE HYDROCHLORIDE 50 MG: 50 TABLET ORAL at 23:32

## 2021-03-24 RX ADMIN — SODIUM CHLORIDE, PRESERVATIVE FREE 10 ML: 5 INJECTION INTRAVENOUS at 09:01

## 2021-03-24 RX ADMIN — Medication 10 MG: at 23:31

## 2021-03-24 RX ADMIN — ATORVASTATIN CALCIUM 40 MG: 40 TABLET, FILM COATED ORAL at 23:31

## 2021-03-24 RX ADMIN — PANTOPRAZOLE SODIUM 40 MG: 40 TABLET, DELAYED RELEASE ORAL at 05:25

## 2021-03-24 RX ADMIN — METFORMIN HYDROCHLORIDE 500 MG: 500 TABLET ORAL at 09:02

## 2021-03-24 ASSESSMENT — PAIN DESCRIPTION - PAIN TYPE
TYPE: ACUTE PAIN
TYPE: ACUTE PAIN

## 2021-03-24 ASSESSMENT — PAIN DESCRIPTION - LOCATION: LOCATION: RIB CAGE

## 2021-03-24 ASSESSMENT — PAIN DESCRIPTION - ORIENTATION
ORIENTATION: LEFT
ORIENTATION: LEFT

## 2021-03-24 ASSESSMENT — PAIN DESCRIPTION - DESCRIPTORS
DESCRIPTORS: ACHING;CONSTANT;DISCOMFORT
DESCRIPTORS: ACHING

## 2021-03-24 ASSESSMENT — PAIN SCALES - GENERAL: PAINLEVEL_OUTOF10: 8

## 2021-03-24 NOTE — PROGRESS NOTES
Physical Therapy    0624/0624-02    Patient unavailable for physical therapy treatment due to diarrhea and pain with his ribs on his left side. Liliana Cheney.  [place]  [facility]  LIC # [id number]

## 2021-03-24 NOTE — PROGRESS NOTES
Patient ID:  Brandon Pedraza  [id number]  61 y.o.  1960    HPI:  Patient denies any pain. Denies shortness of Breath, known history of multifocal atrial tachycardia which at times on EKG on monitor looks like atrial fibrillation, normal ejection fraction, frequent fall with chronic alcohol abuse patient does not keep appointment in the office in late time I see when he ends up in the hospital, does not follow-up with psychiatrist, history of depression. . Questions, answers, and tests reviewed. ROS:  Cardiovascular:   Denies any chest pain, irregular heartbeats, or palpitations. Respiratory:   Denies shortness of breath, coughing, sputum production, hemoptysis, or wheezing. Gastrointestinal:   Denies nausea, vomiting, diarrhea, or constipation. Denies any abdominal pain. Extremities:   Denies any lower extremity swelling or edema. Neurology:    Denies any headache or focal neurological deficits. No weakness or paresthesia. Derm:    Denies any rashes, ulcers, or excoriations. Denies bruising. Genitourinary:    Denies any urgency, frequency, hematuria. Voiding without difficulty. Physical Exam:    Vitals:    03/23/21 1853   BP:    Pulse: 98   Resp:    Temp:    SpO2:        HEENT:  PERRLA. EOMI. Sclera clear. Buccal mucosa moist.    Neck:  Supple. Trachea midline. No thyromegaly. No JVD. No bruits. Heart:  Rhythm regular, rate controlled. No murmurs. Lungs:  Symmetrical. Clear to auscultation bilaterally. No wheezes. No rhonchi. No rales. Abdomen: Soft. Non-tender. Non-distended. Bowel sounds positive. No organomegaly or masses. No pain on palpation    Extremities:  Peripheral pulses present. No peripheral edema. No ulcers. Neurologic:  Alert x 3. No focal deficit. Cranial nerves grossly intact. Skin:  No petechia. No hemorrhage. No wounds.     Labs:  CBC:   Lab Results   Component Value Date    WBC 10.5 03/22/2021    RBC 2.42 03/22/2021    HGB 9.0 03/22/2021    HCT 27.3 03/22/2021    .8 03/22/2021    MCH 37.2 03/22/2021    MCHC 33.0 03/22/2021    RDW 14.3 03/22/2021     03/22/2021    MPV 9.6 03/22/2021     CMP:    Lab Results   Component Value Date     03/23/2021    K 3.5 03/23/2021    K 3.0 03/22/2021    CL 98 03/23/2021    CO2 26 03/23/2021    BUN 20 03/23/2021    CREATININE 0.8 03/23/2021    GFRAA >60 03/23/2021    LABGLOM >60 03/23/2021    GLUCOSE 113 03/23/2021    PROT 5.5 03/23/2021    LABALBU 3.1 03/23/2021    CALCIUM 7.0 03/23/2021    BILITOT 0.6 03/23/2021    ALKPHOS 140 03/23/2021    AST 47 03/23/2021    ALT 10 03/23/2021     PT/INR:    Lab Results   Component Value Date    PROTIME 14.1 03/20/2021    INR 1.2 03/20/2021         US CAROTID ARTERY BILATERAL   Final Result   The right internal carotid artery demonstrates 0-50% stenosis. The left internal carotid artery demonstrates 0-50% stenosis. Bilateral vertebral arteries are patent with flow in the normal direction. CT HEAD WO CONTRAST   Final Result   1.   1.  There is no acute intracranial abnormality. Specifically, there is   no intracranial hemorrhage. 2. Age-appropriate atrophy and periventricular leukomalacia,   3. Chronic bilateral maxillary sinusitis         CT CHEST WO CONTRAST   Final Result   Old healed fractures of the 5th and 6th ribs on the left with no acute   fracture. Chronic obstructive lung changes with mild subsegmental atelectasis or   scarring scattered along the left lung base anteriorly and posteriorly with   associated pleural thickening posteriorly. Suggest follow-up with chest   x-rays to assure stability      Mildly atherosclerotic thoracic aorta with no aneurysm or dissection and   unremarkable mediastinum. Moderate coronary artery calcifications, suggest cardiology follow-up      4 cm right adrenal mass which may represent an adenoma but remains   indeterminate.   Suggest a follow-up CT scan or MRI of the abdomen for further characterization. Mild hepatomegaly and chronic liver changes with fatty replacement throughout      Probable gynecomastia bilaterally. Other Data:      Intake/Output Summary (Last 24 hours) at 3/23/2021 2114  Last data filed at 3/23/2021 1741  Gross per 24 hour   Intake 1690 ml   Output 300 ml   Net 1390 ml         Scheduled Medications:   metoprolol succinate  100 mg Oral BID    ipratropium-albuterol  1 ampule Inhalation Q4H WA    metFORMIN  500 mg Oral BID WC    pantoprazole  40 mg Oral QAM AC    alogliptin  12.5 mg Oral Daily    atorvastatin  40 mg Oral Nightly    traZODone  50 mg Oral Nightly    nicotine  1 patch Transdermal Daily    lidocaine  1 patch Transdermal Daily    melatonin  10 mg Oral Nightly         Infusion Medications:      Assessment:   Patient Active Problem List    Diagnosis Date Noted    Hypomagnesemia     Cardiac arrhythmia 03/20/2021    Epidermal cyst of neck          Plan: Multifocal atrial tachycardia get twelve-lead EKG. Chronic alcohol abuse and frequent fall patient not candidate even if it is atrial fibrillation for any kind of anticoagulation. Cor pulmonale and COPD continue his inhalers. Diabetes mellitus continue Metformin, alogliptin. Tobacco abuse continue NicoDerm patch  Discharge planning patient will require psych follow-up, social service consult. Continue current therapy. See orders for further plan of care.     Completed By:  Dr. Stan Zafar MD, Dino Maillard.  9:14 PM  3/23/2021      Electronically signed by Dinah Leal MD on 3/23/21 at 9:14 PM EDT

## 2021-03-24 NOTE — CARE COORDINATION
SOCIAL WORK / DISCHARGE PLANNING:  COVID neg 3/20. Sw met with pt at bedside to discuss transition to care. Pt remains frustrated with \"lack of answers\" to what is going on with him and the passing out. He denies wanting to consider [facility], plans to return home with dtr/ grandchildren. Discussed his O2 use again at home, he doesn't know what provider and he only has 2 portable tanks no concentrator. [facility] spoke with Julieta Salazar pt was dc 2/1/21 from [facility] with an order for 6L O2 continous but set up was never fully arranged as pt may never have called for concentrator delivery therefore has not been using O2. Discussed with Alexandre Powers RN need for home O2 eval as would need new testing and script as previous order cancelled. Pt states he has not had a drink x 2 weeks, hopes to continue to abstain but declines any addiction services/ peer recovery. Psych consult noted and outpt recommended but pt is vague whether he wants this set up or not. May benefit from [facility] at [place] if agreeable, he said \" I will think about it\", Asha Hahn provider choice and order would be needed.                Electronically signed by AVERY Mccartney on 3/24/2021 at 3:10 PM

## 2021-03-24 NOTE — PROGRESS NOTES
CHIEF COMPLAINT: Follow-up for palpitations, atrial fibrillation, coronary calcifications    Date of Service: 3/24/2021    HISTORY OF PRESENT ILLNESS:  Patient presented with falls. Current telemetry demonstrates atrial fibrillation, rate 110    No CP or angina. Chronic HENDERSON / COPD. No new overnight cardiac complaints.     Review of Systems:  Cardiac: As per HPI  General: No fever, chills  Pulmonary: As per HPI  HEENT: No visual disturbances, difficult swallowing  GI: No nausea, vomiting  : No dysuria, hematuria  Endocrine: No thyroid disease, +DM  Musculoskeletal: MARCIAL x 4, no focal motor deficits  Skin: Intact, no rashes  Neuro: No headache, seizures  Psych: Currently with no depression, anxiety    Past Medical History:   Diagnosis Date    Diabetes mellitus ([facility] [address]     Hyperlipidemia     Hypertension        Patient Active Problem List   Diagnosis    Epidermal cyst of neck    Cardiac arrhythmia    Hypomagnesemia       No Known Allergies    Current Facility-Administered Medications   Medication Dose Route Frequency Provider Last Rate Last Admin    metoprolol succinate (TOPROL XL) extended release tablet 100 mg  100 mg Oral BID Arlette Arnold MD   100 mg at 03/24/21 0902    ipratropium-albuterol (DUONEB) nebulizer solution 1 ampule  1 ampule Inhalation Q4H WA Paula Osborne MD   1 ampule at 03/24/21 0956    metFORMIN (GLUCOPHAGE) tablet 500 mg  500 mg Oral BID WC Paula Osborne MD   500 mg at 03/24/21 0902    pantoprazole (PROTONIX) tablet 40 mg  40 mg Oral QAM AC Paula Osborne MD   40 mg at 03/24/21 0525    alogliptin (NESINA) tablet 12.5 mg  12.5 mg Oral Daily Paula Osborne MD   12.5 mg at 03/24/21 0902    atorvastatin (LIPITOR) tablet 40 mg  40 mg Oral Nightly Mat Bay DO   40 mg at 03/23/21 2325    traZODone (DESYREL) tablet 50 mg  50 mg Oral Nightly Paula Osborne MD   50 mg at 03/23/21 2325    sodium chloride flush 0.9 % injection 10 mL  10 mL Intravenous PRN Wilma Estimable Pulse 112   Temp 97.3 °F (36.3 °C) (Infrared)   Resp 17   Ht 5' 11\" (1.803 m)   Wt 241 lb 14.4 oz (109.7 kg)   SpO2 99%   BMI 33.74 kg/m²   Appearance: Awake, alert and oriented x 3, no acute respiratory distress  Skin: Intact, no rash  Head: Normocephalic, atraumatic  Eyes: EOMI, no conjunctival erythema  ENMT: No pharyngeal erythema, MMM, no rhinorrhea, no dentures  Neck: Supple, no elevated JVP, no carotid bruits  Lungs: Clear to auscultation bilaterally. No wheezes, rales, or rhonchi. Cardiac: IRRR, no murmurs apparent  Abdomen: Soft, nontender, +bowel sounds  Extremities: Moves all extremities x 4, no lower extremity edema  Neurologic: No focal motor deficits apparent, normal mood and affect, alert and oriented x 3  CBC:   Lab Results   Component Value Date    WBC 7.5 03/24/2021    RBC 2.20 03/24/2021    HGB 8.3 03/24/2021    HCT 25.4 03/24/2021    .5 03/24/2021    MCH 37.7 03/24/2021    MCHC 32.7 03/24/2021    RDW 14.4 03/24/2021     03/24/2021    MPV 10.0 03/24/2021     BMP:   Lab Results   Component Value Date     03/24/2021    K 3.5 03/23/2021    K 3.0 03/22/2021    CL 98 03/24/2021    CO2 26 03/24/2021    BUN 11 03/24/2021    LABALBU 2.9 03/24/2021    CREATININE 0.7 03/24/2021    CALCIUM 7.1 03/24/2021    GFRAA >60 03/24/2021    LABGLOM >60 03/24/2021     Lab Results   Component Value Date    CREATININE 0.7 03/24/2021    BUN 11 03/24/2021     03/24/2021    K 3.5 03/23/2021    CL 98 03/24/2021    CO2 26 03/24/2021     Magnesium:    Lab Results   Component Value Date    MG 1.6 03/23/2021     Cardiac Enzymes:   Lab Results   Component Value Date    TROPONINI 0.02 03/20/2021      PT/INR:    Lab Results   Component Value Date    PROTIME 14.1 03/20/2021    INR 1.2 03/20/2021     TSH:    Lab Results   Component Value Date    TSH 1.660 09/03/2019     Telemetry (3/24/2021): atrial fibrillation, rate 110    EKG:  nonspecific ST and T waves changes, atrial fibrillation.     CT chest without contrast 3/20/2021:  Old healed fractures of the 5th and 6th ribs on the left with no acute   fracture.       Chronic obstructive lung changes with mild subsegmental atelectasis or   scarring scattered along the left lung base anteriorly and posteriorly with   associated pleural thickening posteriorly.  Suggest follow-up with chest   x-rays to assure stability       Mildly atherosclerotic thoracic aorta with no aneurysm or dissection and   unremarkable mediastinum.       Moderate coronary artery calcifications, suggest cardiology follow-up       4 cm right adrenal mass which may represent an adenoma but remains   indeterminate.  Suggest a follow-up CT scan or MRI of the abdomen for further   characterization.       Mild hepatomegaly and chronic liver changes with fatty replacement throughout       Probable gynecomastia bilaterally. Echocardiogram: 3/23/21   Normal left ventricular systolic function. Ejection fraction is visually estimated at 60%. Mild concentric left ventricular hypertrophy. Normal right ventricular size and function (TAPSE 2.2 cm). Indeterminate diastolic function. Mildly dilated left atrium by volume index. Physiologic and/or trace tricuspid regurgitation. ASSESSMENT AND PLAN:  Patient Active Problem List   Diagnosis    Epidermal cyst of neck    Cardiac arrhythmia    Hypomagnesemia     1. Palpitations/persistent atrial fibrillation    Echocardiogram results outlined above    Switched BB to Toprol XL on 3/23/21 (up-titrate as able) / intermittent hypotension (stopped norvasc this admission / ACE-I on hold) / most recent SBP     S/p IV digoxin on 3/23/21 -- will add po digoxin    Once H/H stabilizes and if no contraindications, recommending adding NOAC    If able to anticoagulate uninterrupted, discussed option of JORGE/CVN    2. Coronary calcifications: No CP or angina. Medically manage for now. 3. Multiple falls    4.  ANALI: ACE-I on hold  - prior Cr ~ 1. 0-1.2 --> 1.7 on admission --> 1.4 --> 0.8 --> 0.7    5. Ongoing tobacco abuse / COPD  - Counseled re: cessation    6. HTN  - Intermittent hypotension  - As per #1    7. Lipids: Statin. 8. DM: Per primary service. 9. Anemia -- Hgb 17.3 in 2/2020 --> 11.1 on this admission --> 9.8 --> 9.0 --> 8.3  - Monitor CBC    10. Hypokalemia -- K 3.5 (Mg 1.6)  - Keep K > 4 and Mg > 2  - Continue to replace K and Mg PRN    11.  ETOH abuse  - Counseled re: cessation    Nicole Jordan MD  [facility]) Cardiology

## 2021-03-25 VITALS
RESPIRATION RATE: 18 BRPM | DIASTOLIC BLOOD PRESSURE: 63 MMHG | HEART RATE: 106 BPM | SYSTOLIC BLOOD PRESSURE: 136 MMHG | OXYGEN SATURATION: 98 % | HEIGHT: 71 IN | WEIGHT: 241.9 LBS | TEMPERATURE: 97.9 F | BODY MASS INDEX: 33.86 KG/M2

## 2021-03-25 LAB
ALBUMIN SERPL-MCNC: 2.8 G/DL (ref 3.5–5.2)
ALP BLD-CCNC: 172 U/L (ref 40–129)
ALT SERPL-CCNC: 10 U/L (ref 0–40)
ANION GAP SERPL CALCULATED.3IONS-SCNC: 9 MMOL/L (ref 7–16)
AST SERPL-CCNC: 30 U/L (ref 0–39)
BILIRUB SERPL-MCNC: 0.4 MG/DL (ref 0–1.2)
BUN BLDV-MCNC: 10 MG/DL (ref 8–23)
CALCIUM SERPL-MCNC: 7.3 MG/DL (ref 8.6–10.2)
CHLORIDE BLD-SCNC: 98 MMOL/L (ref 98–107)
CO2: 25 MMOL/L (ref 22–29)
CREAT SERPL-MCNC: 0.6 MG/DL (ref 0.7–1.2)
GFR AFRICAN AMERICAN: >60
GFR NON-AFRICAN AMERICAN: >60 ML/MIN/1.73
GLUCOSE BLD-MCNC: 107 MG/DL (ref 74–99)
POTASSIUM REFLEX MAGNESIUM: 4.1 MMOL/L (ref 3.5–5)
SODIUM BLD-SCNC: 132 MMOL/L (ref 132–146)
TOTAL PROTEIN: 5.2 G/DL (ref 6.4–8.3)

## 2021-03-25 PROCEDURE — 6370000000 HC RX 637 (ALT 250 FOR IP): Performed by: INTERNAL MEDICINE

## 2021-03-25 PROCEDURE — 97530 THERAPEUTIC ACTIVITIES: CPT

## 2021-03-25 PROCEDURE — 6370000000 HC RX 637 (ALT 250 FOR IP): Performed by: PHYSICIAN ASSISTANT

## 2021-03-25 PROCEDURE — 80053 COMPREHEN METABOLIC PANEL: CPT

## 2021-03-25 PROCEDURE — 36415 COLL VENOUS BLD VENIPUNCTURE: CPT

## 2021-03-25 PROCEDURE — 94640 AIRWAY INHALATION TREATMENT: CPT

## 2021-03-25 RX ORDER — METOPROLOL SUCCINATE 100 MG/1
100 TABLET, EXTENDED RELEASE ORAL 2 TIMES DAILY
Qty: 30 TABLET | Refills: 3 | Status: SHIPPED | OUTPATIENT
Start: 2021-03-25 | End: 2022-07-10 | Stop reason: ALTCHOICE

## 2021-03-25 RX ORDER — DULOXETIN HYDROCHLORIDE 20 MG/1
20 CAPSULE, DELAYED RELEASE ORAL DAILY
Qty: 30 CAPSULE | Refills: 3 | Status: ON HOLD | OUTPATIENT
Start: 2021-03-25 | End: 2021-09-05 | Stop reason: HOSPADM

## 2021-03-25 RX ORDER — NICOTINE 21 MG/24HR
1 PATCH, TRANSDERMAL 24 HOURS TRANSDERMAL DAILY
Qty: 30 PATCH | Refills: 3 | Status: ON HOLD | OUTPATIENT
Start: 2021-03-25 | End: 2021-09-05 | Stop reason: HOSPADM

## 2021-03-25 RX ORDER — MECOBALAMIN 5000 MCG
10 TABLET,DISINTEGRATING ORAL NIGHTLY
Qty: 30 TABLET | Refills: 5 | Status: SHIPPED | OUTPATIENT
Start: 2021-03-25 | End: 2022-07-10 | Stop reason: ALTCHOICE

## 2021-03-25 RX ORDER — TRAZODONE HYDROCHLORIDE 50 MG/1
50 TABLET ORAL NIGHTLY
Qty: 30 TABLET | Refills: 5 | Status: SHIPPED | OUTPATIENT
Start: 2021-03-25 | End: 2022-07-10 | Stop reason: ALTCHOICE

## 2021-03-25 RX ADMIN — IPRATROPIUM BROMIDE AND ALBUTEROL SULFATE 1 AMPULE: .5; 3 SOLUTION RESPIRATORY (INHALATION) at 10:21

## 2021-03-25 RX ADMIN — PANTOPRAZOLE SODIUM 40 MG: 40 TABLET, DELAYED RELEASE ORAL at 06:38

## 2021-03-25 RX ADMIN — METOPROLOL SUCCINATE 100 MG: 100 TABLET, FILM COATED, EXTENDED RELEASE ORAL at 09:40

## 2021-03-25 RX ADMIN — METFORMIN HYDROCHLORIDE 500 MG: 500 TABLET ORAL at 09:40

## 2021-03-25 RX ADMIN — ALOGLIPTIN 12.5 MG: 12.5 TABLET, FILM COATED ORAL at 09:39

## 2021-03-25 RX ADMIN — IPRATROPIUM BROMIDE AND ALBUTEROL SULFATE 1 AMPULE: .5; 3 SOLUTION RESPIRATORY (INHALATION) at 06:11

## 2021-03-25 RX ADMIN — DIGOXIN 125 MCG: 125 TABLET ORAL at 09:40

## 2021-03-25 NOTE — PROGRESS NOTES
OT BEDSIDE TREATMENT NOTE      Date:3/25/2021  Patient Name: Brandon Pedraza  MRN: [id number]  : 1960  Room: [address] [place] [address]     Referring Provider: Timur Santos MD     Evaluating OT: Wandabetty Ferrari, OTR/L #[id number]     AM-PAC Daily Activity Raw Score:      Recommended Placement: Subacute vs. Home with 24/7 assist if patient meets goals  Recommended Adaptive Equipment: shower chair     Diagnosis:   1. Hypomagnesemia    2. Other cardiac arrhythmia    3. COPD exacerbation (HCC)    4. Closed head injury, initial encounter    5. Syncope and collapse    6. Contusion, shoulder and upper arm, multiple sites, unspecified laterality, initial encounter    7. Closed fracture of multiple ribs of left side, initial encounter          Surgery: N/A       Pertinent Medical History:    Past Medical History        Past Medical History:   Diagnosis Date    Diabetes mellitus ([facility] [address]      Hyperlipidemia      Hypertension           Precautions:  Falls, alarm refusal, low BP (will pass out)     Home Living: Pt lives with daughter in a 1 story home with 1 JACKIE with 0 rail(s). Bathroom setup: tub/shower   Equipment owned: None     Prior Level of Function: Independent with ADLs , Independent with IADLs; ambulated without AD  Driving: Not for a year  Occupation: Retired     Pain Level: Rib pain, Therapist facilitated repositioning to address pain.   Cognition: A&O: 4/4; Follows 2 step directions              Memory:  good              Sequencing:  Fair+              Problem solving:  Fair+              Judgement/safety:  Fair+                Functional Assessment:    Initial Eval Status  Date: 3/23/21 Treatment Status  Date: STGs = LTGs  Time frame: 5-7 days   Feeding Independent           Grooming Stand by Assist   standing Bilateral task  SBA simulated sitting EOB   Independent    UB Dressing Minimal Assist     N/t   Independent    LB Dressing Minimal Assist   Steadying assist upon standing, pulling up waistband   Cuing on safety

## 2021-03-25 NOTE — CARE COORDINATION
SS Note: Covid negative 3/20/21. Pt does not qualify for Home O2 per O2 Sat testing note. MIRIAN met with pt. Pt politely declined [facility], \"I\"ll be fine, thanks\". MIRIAN discussed psychiatric recommendation of pt follow up with outpatient mental health services including therapy and psychotropic medication management in the outpatient setting. Social work may set up OP appointments prior to discharge. Pt stated to MIRIAN psychiatry just prescribed a medication for him, he plans to see if medication is effective then call Dr. Oconnell Peers either way and if physician still recommends outpatient pt will agree to it at that time. MIRIAN reviewed local outpatient mental health center options, [facility] pt's home and with pt's permission SW placed information on pt's discharge instructions should he choose to call.    Electronically signed by AVERY Puente on 3/25/2021 at 10:42 AM

## 2021-03-25 NOTE — PLAN OF CARE
Problem: Falls - Risk of:  Goal: Will remain free from falls  Description: Will remain free from falls  3/25/2021 1018 by See Pérez RN  Outcome: Completed  3/25/2021 0345 by Karina Bui RN  Outcome: Met This Shift  Goal: Absence of physical injury  Description: Absence of physical injury  3/25/2021 1018 by See Pérez RN  Outcome: Completed  3/25/2021 0345 by Karina Bui RN  Outcome: Met This Shift     Problem: Pain:  Goal: Pain level will decrease  Description: Pain level will decrease  3/25/2021 1018 by See Pérez RN  Outcome: Completed  3/25/2021 0345 by Karina Bui RN  Outcome: Met This Shift  Goal: Control of acute pain  Description: Control of acute pain  3/25/2021 1018 by See Pérez RN  Outcome: Completed  3/25/2021 0345 by Karina Bui RN  Outcome: Met This Shift  Goal: Control of chronic pain  Description: Control of chronic pain  Outcome: Completed     Problem: Skin Integrity:  Goal: Will show no infection signs and symptoms  Description: Will show no infection signs and symptoms  3/25/2021 1018 by See Pérez RN  Outcome: Completed  3/25/2021 0345 by Karina Bui RN  Outcome: Met This Shift  Goal: Absence of new skin breakdown  Description: Absence of new skin breakdown  Outcome: Completed

## 2021-03-25 NOTE — PROGRESS NOTES
Patient ID:  Renzo Perez  [id number]  61 y.o.  1960    HPI:  Patient denies any chest pain palpitation. Questions, answers, and tests reviewed. ROS:  Cardiovascular:   Denies any chest pain, irregular heartbeats, or palpitations. Respiratory:   Denies shortness of breath, coughing, sputum production, hemoptysis, or wheezing. Gastrointestinal:   Denies nausea, vomiting, diarrhea, or constipation. Denies any abdominal pain. Extremities:   Denies any lower extremity swelling or edema. Neurology:    Denies any headache or focal neurological deficits. No weakness or paresthesia. Derm:    Denies any rashes, ulcers, or excoriations. Denies bruising. Genitourinary:    Denies any urgency, frequency, hematuria. Voiding without difficulty. Physical Exam:    Vitals:    03/24/21 1600   BP: (!) 122/56   Pulse: 95   Resp: 17   Temp: 97.9 °F (36.6 °C)   SpO2: 99%       HEENT:  PERRLA. EOMI. Sclera clear. Buccal mucosa moist.    Neck:  Supple. Trachea midline. No thyromegaly. No JVD. No bruits. Heart:  Rhythm regular, rate controlled. No murmurs. Lungs:  Symmetrical. Clear to auscultation bilaterally. No wheezes. No rhonchi. No rales. Abdomen: Soft. Non-tender. Non-distended. Bowel sounds positive. No organomegaly or masses. No pain on palpation    Extremities:  Peripheral pulses present. No peripheral edema. No ulcers. Neurologic:  Alert x 3. No focal deficit. Cranial nerves grossly intact. Skin:  No petechia. No hemorrhage. No wounds.     Labs:  CBC:   Lab Results   Component Value Date    WBC 7.5 03/24/2021    RBC 2.20 03/24/2021    HGB 8.3 03/24/2021    HCT 25.4 03/24/2021    .5 03/24/2021    MCH 37.7 03/24/2021    MCHC 32.7 03/24/2021    RDW 14.4 03/24/2021     03/24/2021    MPV 10.0 03/24/2021     CMP:    Lab Results   Component Value Date     03/24/2021    K 3.9 03/24/2021    CL 98 03/24/2021    CO2 26 03/24/2021    BUN 11 03/24/2021    CREATININE 0.7 03/24/2021    GFRAA >60 03/24/2021    LABGLOM >60 03/24/2021    GLUCOSE 115 03/24/2021    PROT 5.5 03/24/2021    LABALBU 2.9 03/24/2021    CALCIUM 7.1 03/24/2021    BILITOT 0.5 03/24/2021    ALKPHOS 169 03/24/2021    AST 42 03/24/2021    ALT 13 03/24/2021     PT/INR:    Lab Results   Component Value Date    PROTIME 14.1 03/20/2021    INR 1.2 03/20/2021         US CAROTID ARTERY BILATERAL   Final Result   The right internal carotid artery demonstrates 0-50% stenosis. The left internal carotid artery demonstrates 0-50% stenosis. Bilateral vertebral arteries are patent with flow in the normal direction. CT HEAD WO CONTRAST   Final Result   1.   1.  There is no acute intracranial abnormality. Specifically, there is   no intracranial hemorrhage. 2. Age-appropriate atrophy and periventricular leukomalacia,   3. Chronic bilateral maxillary sinusitis         CT CHEST WO CONTRAST   Final Result   Old healed fractures of the 5th and 6th ribs on the left with no acute   fracture. Chronic obstructive lung changes with mild subsegmental atelectasis or   scarring scattered along the left lung base anteriorly and posteriorly with   associated pleural thickening posteriorly. Suggest follow-up with chest   x-rays to assure stability      Mildly atherosclerotic thoracic aorta with no aneurysm or dissection and   unremarkable mediastinum. Moderate coronary artery calcifications, suggest cardiology follow-up      4 cm right adrenal mass which may represent an adenoma but remains   indeterminate. Suggest a follow-up CT scan or MRI of the abdomen for further   characterization. Mild hepatomegaly and chronic liver changes with fatty replacement throughout      Probable gynecomastia bilaterally.              Other Data:      Intake/Output Summary (Last 24 hours) at 3/24/2021 2104  Last data filed at 3/24/2021 1716  Gross per 24 hour   Intake 960 ml   Output 400 ml   Net 560 ml         Scheduled Medications:   [START ON 3/25/2021] digoxin  125 mcg Oral Daily    metoprolol succinate  100 mg Oral BID    ipratropium-albuterol  1 ampule Inhalation Q4H WA    metFORMIN  500 mg Oral BID WC    pantoprazole  40 mg Oral QAM AC    alogliptin  12.5 mg Oral Daily    atorvastatin  40 mg Oral Nightly    traZODone  50 mg Oral Nightly    nicotine  1 patch Transdermal Daily    lidocaine  1 patch Transdermal Daily    melatonin  10 mg Oral Nightly         Infusion Medications:      Assessment:   Patient Active Problem List    Diagnosis Date Noted    Hypomagnesemia     Cardiac arrhythmia 03/20/2021    Epidermal cyst of neck          Plan: Multifocal atrial tachycardia continue metoprolol at present dose, on admission metoprolol dose had to be lowered any as needed to be discontinued due to acute renal failure from hypotension dehydration causing multiple falls. 2.  Chronic alcohol abuse patient will need psych follow-up as outpatient. 3.  Hyperlipidemia continue Lipitor 40 mg daily. 4.  Calcified coronary artery medical management per cardiology. 5.  Gastroesophageal reflux disease continue pantoprazole. 6.  COPD continue present DuoNeb aerosol. Check oxygen on room air see if patient qualifies for any home oxygen. 7.  Discharge planning probable discharge home tomorrow  Continue current therapy. See orders for further plan of care.     Completed By:  Dr. Erica Raymond MD, Cameron Haas.  9:04 PM  3/24/2021      Electronically signed by Stefan Jones MD on 3/24/21 at 9:04 PM EDT

## 2021-03-25 NOTE — PROGRESS NOTES
Discharge instructions gone over with pt in detail. All questions answered. Medications were delivered to pt via 'meds to bed'. Pt states daughter will be here to pick pt up at 130 pm.   IV removed and monitor off pt. Pt packed and instructed to call if needs help changing prior to d/c. Pt agreed. Pt currently in bed having lunch with no complaints at this time.

## 2021-03-25 NOTE — DISCHARGE INSTR - DIET
 Low sodium diet   Good nutrition is important when healing from an illness, injury, or surgery. Follow any nutrition recommendations given to you during your hospital stay.  If you were given an oral nutrition supplement while in the hospital, continue to take this supplement at home. You can take it with meals, in-between meals, and/or before bedtime. These supplements can be purchased at most local grocery stores, pharmacies, and chain super-stores.  If you have any questions about your diet or nutrition, call the hospital and ask for the dietitian.

## 2021-03-26 NOTE — DISCHARGE SUMMARY
Physician Discharge Summary     Patient ID:  Oscar August  [id number]  21 y.o.  1960    Admit date: 3/20/2021    Discharge date and time: 3/25/2021  2:09 PM     Admitting Physician: Mandy Morrison MD     Discharge Physician; Mandy Morrison    Admission Diagnoses: Cardiac arrhythmia [I49.9]    Discharge Diagnoses: Acute kidney injury with dehydration elevated creatinine today multifocal atrial tachycardia with frequent falling. COPD exacerbation, chronic alcohol abuse, chronic osteoarthritis pain, anxiety disorder, major recurrent depression, personal history noncompliance. Type 2 diabetes mellitus, morbid obesity, hyperlipidemia    Admission Condition: Low blood pressure, elevated BUN/creatinine, tachycardia, not able to ambulate frequent falls    Discharged Condition: Stable ambulating with walker. Indication for Admission: Hypotension, acute kidney injury, heart rate of 133 tachycardic. Hospital Course: Patient admitted to monitored bed, received IV hydration, patient home medication were placed on hold due to elevated BUN/creatinine and dehydration, patient seen by cardiology, twelve-lead EKG showed multifocal atrial tachycardia although cardiologist thought patient probably was having atrial fibrillation with RVR, patient metoprolol once her blood pressure was stabilized was increased 100 mg twice a day. Patient Diovan was discontinued. Patient was receiving aerosol treatment, oxygen supplementation, once clinically patient improved oxygen was checked on room air post ambulation. Patient was seen by psych due to chronic alcohol abuse, tobacco abuse patient received NicoDerm patch, psych did not initiate any antidepressant, place patient on Cymbalta to help both his osteoarthritis pain, depression and anxiety, patient was seen by  and given information about psych care near to his house as he declined to see psychiatrist as outpatient.     Consults: Cardiology, psych, social service    Significant Diagnostic Studies:   Echo Complete    Result Date: 3/23/2021  Transthoracic Echocardiography Report (TTE)  Demographics   Patient Name      Orlando Joy Gender             Male                    M   Medical Record    [id number]       Room Number        5357  Number   Account #         [de-identified]      Procedure Date     03/23/2021   Corporate ID                     Ordering Physician Fara Denton MD   Accession Number  [id number]     Referring                                   Physician   Date of Birth     1960     Sonographer        Zaida Mistry                                                      CHERIE   Age               61 year(s)     Interpreting       Mike Garcia MD                                   Physician                                    Any Other  Procedure Type of Study   TTE procedure  Procedure Date Date: 03/23/2021 Start: 07:57 AM Height: 71 inches Weight: 260 pounds BSA: 2.36 m^2 BMI: 36.26 kg/m^2  Findings   Left Ventricle  Left ventricle size is normal.  Mild concentric left ventricular hypertrophy. Normal left ventricular systolic function. Ejection fraction is visually estimated at 60%. Indeterminate diastolic function. Right Ventricle  Normal right ventricular size and function (TAPSE 2.2 cm). Left Atrium  Mildly dilated left atrium by volume index. Right Atrium  Normal sized right atrium. Mitral Valve  Physiologic and/or trace mitral regurgitation. No evidence of hemodynamically significant mitral stenosis. Tricuspid Valve  Physiologic and/or trace tricuspid regurgitation. Aortic Valve  No evidence of hemodynamically significant aortic regurgitation or  stenosis. Pulmonic Valve  The pulmonic valve was not well visualized. Pericardial Effusion  No evidence of a hemodynamically significant pericardial effusion. Aorta  Aortic root dimension within normal limits. Conclusions   Summary  Normal left ventricular systolic function. Ejection fraction is visually estimated at 60%. Mild concentric left ventricular hypertrophy. Normal right ventricular size and function (TAPSE 2.2 cm). Indeterminate diastolic function. Mildly dilated left atrium by volume index. Physiologic and/or trace tricuspid regurgitation.    Signature   ----------------------------------------------------------------  Electronically signed by Author Evelyn CARPENTER(Interpreting  physician) on 03/23/2021 11:28 AM  ----------------------------------------------------------------  M-Mode/2D Measurements & Calculations   LV Diastolic    LV Systolic Dimension: 3.3   AV Cusp Separation: 2.3 cmLA  Dimension: 4.5  cm                           Dimension: 4.3 cmAO Root  cm              LV Volume Diastolic: 68.8 ml Dimension: 3.7 cm  LV FS:26.7 %    LV Volume Systolic: 14.7 ml  LV PW           LV EDV/LV EDV Index: 92.8  Diastolic: 1.3  DZ/48 BI/R^6JS ESV/LV ESV  cm              Index: 42.9 ml/18ml/ m^2     RV Diastolic Dimension: 2.9  Septum          EF Calculated: 53.6 %        cm  Diastolic: 1.3  LV Mass Index: 94 l/min*m^2  cm                                               LA volume/Index: 91.3 ml  LV Mass: 222.57 LVOT: 2.2 cm  g  Doppler Measurements & Calculations   MV Peak E-Wave: 0.85 AV Peak Velocity: 1.36 LVOT Peak Velocity: 1 m/s  m/s                  m/s                    LVOT Mean Velocity: 0.78 m/s  MV Peak A-Wave: 0.62 AV Peak Gradient: 7.37 LVOT Peak Gradient: 4 mmHgLVOT  m/s                  mmHg                   Mean Gradient: 2.7 mmHg  MV E/A Ratio: 1.36   AV Mean Velocity: 1.04  MV Peak Gradient:    m/s  3.3 mmHg             AV Mean Gradient: 4.8  MV Mean Gradient:    mmHg                   TR Velocity:2.81 m/s  2.2 mmHg             AV VTI: 22.5 cm        TR Gradient:31.63 mmHg  MV Mean Velocity:    AV Area                PV Peak Velocity: 0.95 m/s  0.73 m/s             (Continuity):3.21 cm^2 PV Peak Gradient: 3.61 mmHg  MV Deceleration                             PV Mean Velocity: 0.78 m/s  Time: 236.2 msec     LVOT VTI: 19 cm        PV Mean Gradient: 2.6 mmHg  MV P1/2t: 53.1 msec  IVRT: 85.4 msec  MVA by PHT:4.14 cm^2  MV Area  (continuity): 3.9  cm^2  [email or web address] [id number]    Ct Head Wo Contrast    Result Date: 3/20/2021  EXAMINATION: CT OF THE HEAD WITHOUT CONTRAST  3/20/2021 4:45 pm TECHNIQUE: CT of the head was performed without the administration of intravenous contrast. Dose modulation, iterative reconstruction, and/or weight based adjustment of the mA/kV was utilized to reduce the radiation dose to as low as reasonably achievable. COMPARISON: None. HISTORY: ORDERING SYSTEM PROVIDED HISTORY: fall, syncope TECHNOLOGIST PROVIDED HISTORY: Has a \"code stroke\" or \"stroke alert\" been called? ->No Reason for exam:->fall, syncope Decision Support Exception->Emergency Medical Condition (MA) FINDINGS: BRAIN/VENTRICLES: There is no acute intracranial hemorrhage, mass effect or midline shift. No abnormal extra-axial fluid collection. The gray-white differentiation is maintained without evidence of an acute infarct. There is no evidence of hydrocephalus. The ventricles, cisterns and sulci are prominent consistent with atrophy. There is decreased attenuation within the periventricular white matter consistent with periventricular leukomalacia. ORBITS: The visualized portion of the orbits demonstrate no acute abnormality. SINUSES: There is significant opacification of the right and left maxillary sinuses. SOFT TISSUES/SKULL:  No acute abnormality of the visualized skull or soft tissues. 1.   1.  There is no acute intracranial abnormality. Specifically, there is no intracranial hemorrhage. 2. Age-appropriate atrophy and periventricular leukomalacia, 3.  Chronic bilateral maxillary sinusitis     Ct Chest Wo Contrast    Result Date: 3/20/2021  EXAMINATION: CT OF THE CHEST WITHOUT CONTRAST 3/20/2021 4:45 pm TECHNIQUE: CT of the chest was performed without the administration of intravenous contrast. Multiplanar reformatted images are provided for review. Dose modulation, iterative reconstruction, and/or weight based adjustment of the mA/kV was utilized to reduce the radiation dose to as low as reasonably achievable. COMPARISON: None. HISTORY: ORDERING SYSTEM PROVIDED HISTORY: fall TECHNOLOGIST PROVIDED HISTORY: Reason for exam:->fall Decision Support Exception->Emergency Medical Condition (MA) FINDINGS: Mediastinum: The thyroid gland is unremarkable. There is mild calcified plaque throughout the aorta which is mildly dilated with no aneurysm. There are moderate coronary artery calcifications. There is no mediastinal mass or adenopathy. The pulmonary arteries are unremarkable. Lungs/pleura: The lungs are hyperinflated and emphysematous. There is mild linear scarring along the right apex medially and extending inferiorly. There are mild subsegmental linear pleural-based opacities along the left lung base anteriorly and posteriorly with mild pleural thickening posteriorly. No effusion or pneumothorax is seen. No obvious pulmonary nodule or mass is seen. There are tiny subpleural bullous lesions posteriorly on the left. There are old healed rib fractures of the 5th and 6th ribs posteriorly on the left. No obvious acute fracture is seen. Upper Abdomen: The left adrenal is normal.  There is a 4 cm mass in the right adrenal gland with slight elevated density measurements. The liver is mildly enlarged with hypertrophy of the caudate and left lobes and fatty replacement throughout. Soft Tissues/Bones: Moderate degenerative changes are seen throughout the spine with no aggressive osseous lesion. There is mild parenchymal density along the retroareolar regions bilaterally. Old healed fractures of the 5th and 6th ribs on the left with no acute fracture.  Chronic obstructive lung changes with mild subsegmental atelectasis or scarring scattered along the left lung base anteriorly and posteriorly with associated pleural thickening posteriorly. Suggest follow-up with chest x-rays to assure stability Mildly atherosclerotic thoracic aorta with no aneurysm or dissection and unremarkable mediastinum. Moderate coronary artery calcifications, suggest cardiology follow-up 4 cm right adrenal mass which may represent an adenoma but remains indeterminate. Suggest a follow-up CT scan or MRI of the abdomen for further characterization. Mild hepatomegaly and chronic liver changes with fatty replacement throughout Probable gynecomastia bilaterally. Us Carotid Artery Bilateral    Result Date: 3/21/2021  EXAMINATION: ULTRASOUND EVALUATION OF THE CAROTID ARTERIES 3/21/2021 COMPARISON: None. HISTORY: ORDERING SYSTEM PROVIDED HISTORY: syncope TECHNOLOGIST PROVIDED HISTORY: Reason for exam:->syncope What reading provider will be dictating this exam?->CRC FINDINGS: RIGHT: The right common carotid artery demonstrates peak systolic velocities of 19.0 cm/sec in the proximal and distal segments respectively. The right internal carotid artery demonstrates the systolic velocities of 56.3 cm/sec in the proximal, mid and distal segments respectively. The external carotid artery is patent. The vertebral artery demonstrates normal antegrade flow. ICA/CCA ratio of 0.9. LEFT: The left common carotid artery demonstrates peak systolic velocities of 486.8 cm/sec in the proximal and distal segments respectively. The left internal carotid artery demonstrates the systolic velocities of 220.9 cm/sec in the proximal, mid and distal segments respectively. The external carotid artery is patent. The vertebral artery demonstrates normal antegrade flow. ICA/CCA ratio of 0.9. The right internal carotid artery demonstrates 0-50% stenosis. The left internal carotid artery demonstrates 0-50% stenosis.  Bilateral vertebral arteries are patent with flow in the normal direction. Treatments: IV hydration, metoprolol dose titrated to 100 mg twice a day, started on duloxetine, NicoDerm patch. Discharge Exam:  [place] patient ambulating with walker. ent awake alert cooperative, vital signs stable, neck no JVD no lymphadenopathy, lungs no wheezing, bilateral rhonchi, normal heart sounds, abdomen obese soft nontender, extremity no edema, neuro no gross neurologic focal deficit. Disposition: Home.     Patient Instructions:      Medication List      START taking these medications    DULoxetine 20 MG extended release capsule  Commonly known as: Cymbalta  Take 1 capsule by mouth daily     melatonin 5 MG Tbdp disintegrating tablet  Take 2 tablets by mouth nightly     nicotine 21 MG/24HR  Commonly known as: NICODERM CQ  Place 1 patch onto the skin daily     traZODone 50 MG tablet  Commonly known as: DESYREL  Take 1 tablet by mouth nightly        CHANGE how you take these medications    metoprolol succinate 100 MG extended release tablet  Commonly known as: TOPROL XL  Take 1 tablet by mouth 2 times daily  What changed:   · medication strength  · how much to take  · when to take this        CONTINUE taking these medications    allopurinol 300 MG tablet  Commonly known as: ZYLOPRIM     Anoro Ellipta 62.5-25 MCG/INH Aepb inhaler  Generic drug: umeclidinium-vilanterol     Januvia 50 MG tablet  Generic drug: SITagliptin     metFORMIN 500 MG tablet  Commonly known as: GLUCOPHAGE     omeprazole 40 MG delayed release capsule  Commonly known as: PRILOSEC     pravastatin 20 MG tablet  Commonly known as: PRAVACHOL        STOP taking these medications    albuterol sulfate  (90 Base) MCG/ACT inhaler  Commonly known as: Ventolin HFA     amLODIPine-benazepril 10-40 MG per capsule  Commonly known as: LOTREL     ibuprofen 800 MG tablet  Commonly known as: IBU     zolpidem 5 MG tablet  Commonly known as: AMBIEN           Where to Get Your Medications

## 2021-03-29 LAB
EKG ATRIAL RATE: 91 BPM
EKG P-R INTERVAL: 140 MS
EKG Q-T INTERVAL: 374 MS
EKG QRS DURATION: 70 MS
EKG QTC CALCULATION (BAZETT): 460 MS
EKG R AXIS: -9 DEGREES
EKG T AXIS: 114 DEGREES
EKG VENTRICULAR RATE: 91 BPM

## 2021-03-30 ENCOUNTER — TELEPHONE (OUTPATIENT)
Dept: CARDIOLOGY CLINIC | Age: 61
End: 2021-03-30

## 2021-09-02 ENCOUNTER — HOSPITAL ENCOUNTER (OUTPATIENT)
Age: 61
Setting detail: OBSERVATION
Discharge: HOME | End: 2021-09-05
Attending: EMERGENCY MEDICINE | Admitting: STUDENT IN AN ORGANIZED HEALTH CARE EDUCATION/TRAINING PROGRAM
Payer: COMMERCIAL

## 2021-09-02 ENCOUNTER — APPOINTMENT (OUTPATIENT)
Dept: CT IMAGING | Age: 61
End: 2021-09-02
Payer: COMMERCIAL

## 2021-09-02 ENCOUNTER — APPOINTMENT (OUTPATIENT)
Dept: GENERAL RADIOLOGY | Age: 61
End: 2021-09-02
Payer: COMMERCIAL

## 2021-09-02 DIAGNOSIS — R55 SYNCOPE AND COLLAPSE: ICD-10-CM

## 2021-09-02 DIAGNOSIS — E27.9 LESION OF ADRENAL GLAND (HCC): ICD-10-CM

## 2021-09-02 DIAGNOSIS — R07.9 CHEST PAIN, UNSPECIFIED TYPE: Primary | ICD-10-CM

## 2021-09-02 PROBLEM — R07.89 ATYPICAL CHEST PAIN: Status: ACTIVE | Noted: 2021-09-02

## 2021-09-02 LAB
ACETAMINOPHEN LEVEL: <5 MCG/ML (ref 10–30)
ALBUMIN SERPL-MCNC: 3.6 G/DL (ref 3.5–5.2)
ALP BLD-CCNC: 155 U/L (ref 40–129)
ALT SERPL-CCNC: 46 U/L (ref 0–40)
ANION GAP SERPL CALCULATED.3IONS-SCNC: 10 MMOL/L (ref 7–16)
ANISOCYTOSIS: ABNORMAL
AST SERPL-CCNC: 57 U/L (ref 0–39)
BASOPHILS ABSOLUTE: 0 E9/L (ref 0–0.2)
BASOPHILS RELATIVE PERCENT: 0.5 % (ref 0–2)
BILIRUB SERPL-MCNC: 0.6 MG/DL (ref 0–1.2)
BUN BLDV-MCNC: 11 MG/DL (ref 6–23)
CALCIUM SERPL-MCNC: 9.4 MG/DL (ref 8.6–10.2)
CHLORIDE BLD-SCNC: 98 MMOL/L (ref 98–107)
CO2: 24 MMOL/L (ref 22–29)
CREAT SERPL-MCNC: 0.6 MG/DL (ref 0.7–1.2)
EKG ATRIAL RATE: 102 BPM
EKG P AXIS: 55 DEGREES
EKG P-R INTERVAL: 150 MS
EKG Q-T INTERVAL: 364 MS
EKG QRS DURATION: 86 MS
EKG QTC CALCULATION (BAZETT): 474 MS
EKG R AXIS: -53 DEGREES
EKG T AXIS: 93 DEGREES
EKG VENTRICULAR RATE: 102 BPM
EOSINOPHILS ABSOLUTE: 0 E9/L (ref 0.05–0.5)
EOSINOPHILS RELATIVE PERCENT: 1.9 % (ref 0–6)
ETHANOL: <10 MG/DL (ref 0–0.08)
GFR AFRICAN AMERICAN: >60
GFR NON-AFRICAN AMERICAN: >60 ML/MIN/1.73
GLUCOSE BLD-MCNC: 132 MG/DL (ref 74–99)
HCT VFR BLD CALC: 42.5 % (ref 37–54)
HEMOGLOBIN: 14.6 G/DL (ref 12.5–16.5)
LYMPHOCYTES ABSOLUTE: 2.65 E9/L (ref 1.5–4)
LYMPHOCYTES RELATIVE PERCENT: 18.3 % (ref 20–42)
MAGNESIUM: 1.7 MG/DL (ref 1.6–2.6)
MAGNESIUM: 1.8 MG/DL (ref 1.6–2.6)
MCH RBC QN AUTO: 37.3 PG (ref 26–35)
MCHC RBC AUTO-ENTMCNC: 34.4 % (ref 32–34.5)
MCV RBC AUTO: 108.7 FL (ref 80–99.9)
METER GLUCOSE: 161 MG/DL (ref 74–99)
MONOCYTES ABSOLUTE: 0.74 E9/L (ref 0.1–0.95)
MONOCYTES RELATIVE PERCENT: 5.2 % (ref 2–12)
NEUTROPHILS ABSOLUTE: 11.32 E9/L (ref 1.8–7.3)
NEUTROPHILS RELATIVE PERCENT: 76.5 % (ref 43–80)
OVALOCYTES: ABNORMAL
PDW BLD-RTO: 13.3 FL (ref 11.5–15)
PHOSPHORUS: 4.1 MG/DL (ref 2.5–4.5)
PLATELET # BLD: 235 E9/L (ref 130–450)
PMV BLD AUTO: 10.6 FL (ref 7–12)
POIKILOCYTES: ABNORMAL
POTASSIUM SERPL-SCNC: 5 MMOL/L (ref 3.5–5)
PRO-BNP: 541 PG/ML (ref 0–125)
RBC # BLD: 3.91 E12/L (ref 3.8–5.8)
SALICYLATE, SERUM: <0.3 MG/DL (ref 0–30)
SODIUM BLD-SCNC: 132 MMOL/L (ref 132–146)
TEAR DROP CELLS: ABNORMAL
TOTAL PROTEIN: 7.3 G/DL (ref 6.4–8.3)
TRICYCLIC ANTIDEPRESSANTS SCREEN SERUM: NEGATIVE NG/ML
TROPONIN, HIGH SENSITIVITY: 15 NG/L (ref 0–11)
TROPONIN, HIGH SENSITIVITY: 16 NG/L (ref 0–11)
WBC # BLD: 14.7 E9/L (ref 4.5–11.5)

## 2021-09-02 PROCEDURE — 94640 AIRWAY INHALATION TREATMENT: CPT

## 2021-09-02 PROCEDURE — 83880 ASSAY OF NATRIURETIC PEPTIDE: CPT

## 2021-09-02 PROCEDURE — 99284 EMERGENCY DEPT VISIT MOD MDM: CPT

## 2021-09-02 PROCEDURE — 83835 ASSAY OF METANEPHRINES: CPT

## 2021-09-02 PROCEDURE — 6360000004 HC RX CONTRAST MEDICATION: Performed by: RADIOLOGY

## 2021-09-02 PROCEDURE — 96372 THER/PROPH/DIAG INJ SC/IM: CPT

## 2021-09-02 PROCEDURE — 99219 PR INITIAL OBSERVATION CARE/DAY 50 MINUTES: CPT | Performed by: STUDENT IN AN ORGANIZED HEALTH CARE EDUCATION/TRAINING PROGRAM

## 2021-09-02 PROCEDURE — 96374 THER/PROPH/DIAG INJ IV PUSH: CPT

## 2021-09-02 PROCEDURE — 2580000003 HC RX 258: Performed by: NURSE PRACTITIONER

## 2021-09-02 PROCEDURE — 6360000002 HC RX W HCPCS: Performed by: NURSE PRACTITIONER

## 2021-09-02 PROCEDURE — 80179 DRUG ASSAY SALICYLATE: CPT

## 2021-09-02 PROCEDURE — 93005 ELECTROCARDIOGRAM TRACING: CPT | Performed by: EMERGENCY MEDICINE

## 2021-09-02 PROCEDURE — 82077 ASSAY SPEC XCP UR&BREATH IA: CPT

## 2021-09-02 PROCEDURE — 71275 CT ANGIOGRAPHY CHEST: CPT

## 2021-09-02 PROCEDURE — 96375 TX/PRO/DX INJ NEW DRUG ADDON: CPT

## 2021-09-02 PROCEDURE — 80307 DRUG TEST PRSMV CHEM ANLYZR: CPT

## 2021-09-02 PROCEDURE — 70450 CT HEAD/BRAIN W/O DYE: CPT

## 2021-09-02 PROCEDURE — 71045 X-RAY EXAM CHEST 1 VIEW: CPT

## 2021-09-02 PROCEDURE — 6370000000 HC RX 637 (ALT 250 FOR IP): Performed by: EMERGENCY MEDICINE

## 2021-09-02 PROCEDURE — 6360000002 HC RX W HCPCS: Performed by: EMERGENCY MEDICINE

## 2021-09-02 PROCEDURE — 84100 ASSAY OF PHOSPHORUS: CPT

## 2021-09-02 PROCEDURE — 84484 ASSAY OF TROPONIN QUANT: CPT

## 2021-09-02 PROCEDURE — G0378 HOSPITAL OBSERVATION PER HR: HCPCS

## 2021-09-02 PROCEDURE — 2580000003 HC RX 258: Performed by: EMERGENCY MEDICINE

## 2021-09-02 PROCEDURE — 80053 COMPREHEN METABOLIC PANEL: CPT

## 2021-09-02 PROCEDURE — 85025 COMPLETE CBC W/AUTO DIFF WBC: CPT

## 2021-09-02 PROCEDURE — 74177 CT ABD & PELVIS W/CONTRAST: CPT

## 2021-09-02 PROCEDURE — 36415 COLL VENOUS BLD VENIPUNCTURE: CPT

## 2021-09-02 PROCEDURE — 6370000000 HC RX 637 (ALT 250 FOR IP): Performed by: NURSE PRACTITIONER

## 2021-09-02 PROCEDURE — 82962 GLUCOSE BLOOD TEST: CPT

## 2021-09-02 PROCEDURE — 80143 DRUG ASSAY ACETAMINOPHEN: CPT

## 2021-09-02 PROCEDURE — APPSS45 APP SPLIT SHARED TIME 31-45 MINUTES: Performed by: NURSE PRACTITIONER

## 2021-09-02 PROCEDURE — 83735 ASSAY OF MAGNESIUM: CPT

## 2021-09-02 RX ORDER — PRAVASTATIN SODIUM 20 MG
20 TABLET ORAL NIGHTLY
Status: DISCONTINUED | OUTPATIENT
Start: 2021-09-02 | End: 2021-09-05 | Stop reason: HOSPADM

## 2021-09-02 RX ORDER — ACETAMINOPHEN 500 MG
1000 TABLET ORAL ONCE
Status: COMPLETED | OUTPATIENT
Start: 2021-09-02 | End: 2021-09-02

## 2021-09-02 RX ORDER — ALLOPURINOL 100 MG/1
100 TABLET ORAL DAILY
Status: DISCONTINUED | OUTPATIENT
Start: 2021-09-02 | End: 2021-09-05 | Stop reason: HOSPADM

## 2021-09-02 RX ORDER — ONDANSETRON 4 MG/1
4 TABLET, ORALLY DISINTEGRATING ORAL EVERY 8 HOURS PRN
Status: DISCONTINUED | OUTPATIENT
Start: 2021-09-02 | End: 2021-09-05 | Stop reason: HOSPADM

## 2021-09-02 RX ORDER — NITROGLYCERIN 0.4 MG/1
0.4 TABLET SUBLINGUAL EVERY 5 MIN PRN
Status: DISCONTINUED | OUTPATIENT
Start: 2021-09-02 | End: 2021-09-05 | Stop reason: HOSPADM

## 2021-09-02 RX ORDER — DEXTROSE MONOHYDRATE 50 MG/ML
100 INJECTION, SOLUTION INTRAVENOUS PRN
Status: DISCONTINUED | OUTPATIENT
Start: 2021-09-02 | End: 2021-09-05 | Stop reason: HOSPADM

## 2021-09-02 RX ORDER — NICOTINE 21 MG/24HR
1 PATCH, TRANSDERMAL 24 HOURS TRANSDERMAL DAILY
Status: CANCELLED | OUTPATIENT
Start: 2021-09-02

## 2021-09-02 RX ORDER — METOPROLOL SUCCINATE 100 MG/1
100 TABLET, EXTENDED RELEASE ORAL 2 TIMES DAILY
Status: DISCONTINUED | OUTPATIENT
Start: 2021-09-02 | End: 2021-09-05 | Stop reason: HOSPADM

## 2021-09-02 RX ORDER — ONDANSETRON 2 MG/ML
4 INJECTION INTRAMUSCULAR; INTRAVENOUS EVERY 6 HOURS PRN
Status: DISCONTINUED | OUTPATIENT
Start: 2021-09-02 | End: 2021-09-05 | Stop reason: HOSPADM

## 2021-09-02 RX ORDER — ARFORMOTEROL TARTRATE 15 UG/2ML
15 SOLUTION RESPIRATORY (INHALATION) 2 TIMES DAILY
Status: DISCONTINUED | OUTPATIENT
Start: 2021-09-02 | End: 2021-09-05 | Stop reason: HOSPADM

## 2021-09-02 RX ORDER — SODIUM CHLORIDE 0.9 % (FLUSH) 0.9 %
5-40 SYRINGE (ML) INJECTION PRN
Status: DISCONTINUED | OUTPATIENT
Start: 2021-09-02 | End: 2021-09-05 | Stop reason: HOSPADM

## 2021-09-02 RX ORDER — TRAZODONE HYDROCHLORIDE 50 MG/1
50 TABLET ORAL NIGHTLY
Status: DISCONTINUED | OUTPATIENT
Start: 2021-09-02 | End: 2021-09-05 | Stop reason: HOSPADM

## 2021-09-02 RX ORDER — SODIUM CHLORIDE 9 MG/ML
25 INJECTION, SOLUTION INTRAVENOUS PRN
Status: DISCONTINUED | OUTPATIENT
Start: 2021-09-02 | End: 2021-09-05 | Stop reason: HOSPADM

## 2021-09-02 RX ORDER — ACETAMINOPHEN 325 MG/1
650 TABLET ORAL EVERY 6 HOURS PRN
Status: DISCONTINUED | OUTPATIENT
Start: 2021-09-02 | End: 2021-09-05 | Stop reason: HOSPADM

## 2021-09-02 RX ORDER — DULOXETIN HYDROCHLORIDE 20 MG/1
20 CAPSULE, DELAYED RELEASE ORAL DAILY
Status: DISCONTINUED | OUTPATIENT
Start: 2021-09-02 | End: 2021-09-04

## 2021-09-02 RX ORDER — SODIUM CHLORIDE 0.9 % (FLUSH) 0.9 %
5-40 SYRINGE (ML) INJECTION EVERY 12 HOURS SCHEDULED
Status: DISCONTINUED | OUTPATIENT
Start: 2021-09-02 | End: 2021-09-05 | Stop reason: HOSPADM

## 2021-09-02 RX ORDER — TRAMADOL HYDROCHLORIDE 50 MG/1
50 TABLET ORAL
Status: COMPLETED | OUTPATIENT
Start: 2021-09-02 | End: 2021-09-02

## 2021-09-02 RX ORDER — NICOTINE POLACRILEX 4 MG
15 LOZENGE BUCCAL PRN
Status: DISCONTINUED | OUTPATIENT
Start: 2021-09-02 | End: 2021-09-05 | Stop reason: HOSPADM

## 2021-09-02 RX ORDER — ACETAMINOPHEN 650 MG/1
650 SUPPOSITORY RECTAL EVERY 6 HOURS PRN
Status: DISCONTINUED | OUTPATIENT
Start: 2021-09-02 | End: 2021-09-05 | Stop reason: HOSPADM

## 2021-09-02 RX ORDER — DEXTROSE MONOHYDRATE 25 G/50ML
12.5 INJECTION, SOLUTION INTRAVENOUS PRN
Status: DISCONTINUED | OUTPATIENT
Start: 2021-09-02 | End: 2021-09-05 | Stop reason: HOSPADM

## 2021-09-02 RX ORDER — POLYETHYLENE GLYCOL 3350 17 G/17G
17 POWDER, FOR SOLUTION ORAL DAILY PRN
Status: DISCONTINUED | OUTPATIENT
Start: 2021-09-02 | End: 2021-09-05 | Stop reason: HOSPADM

## 2021-09-02 RX ORDER — METOCLOPRAMIDE HYDROCHLORIDE 5 MG/ML
10 INJECTION INTRAMUSCULAR; INTRAVENOUS ONCE
Status: COMPLETED | OUTPATIENT
Start: 2021-09-02 | End: 2021-09-02

## 2021-09-02 RX ORDER — DIPHENHYDRAMINE HYDROCHLORIDE 50 MG/ML
50 INJECTION INTRAMUSCULAR; INTRAVENOUS ONCE
Status: COMPLETED | OUTPATIENT
Start: 2021-09-02 | End: 2021-09-02

## 2021-09-02 RX ORDER — 0.9 % SODIUM CHLORIDE 0.9 %
1000 INTRAVENOUS SOLUTION INTRAVENOUS ONCE
Status: COMPLETED | OUTPATIENT
Start: 2021-09-02 | End: 2021-09-02

## 2021-09-02 RX ORDER — KETOROLAC TROMETHAMINE 30 MG/ML
30 INJECTION, SOLUTION INTRAMUSCULAR; INTRAVENOUS ONCE
Status: COMPLETED | OUTPATIENT
Start: 2021-09-02 | End: 2021-09-02

## 2021-09-02 RX ORDER — PANTOPRAZOLE SODIUM 40 MG/1
40 TABLET, DELAYED RELEASE ORAL
Status: DISCONTINUED | OUTPATIENT
Start: 2021-09-03 | End: 2021-09-05 | Stop reason: HOSPADM

## 2021-09-02 RX ADMIN — SODIUM CHLORIDE 1000 ML: 9 INJECTION, SOLUTION INTRAVENOUS at 15:41

## 2021-09-02 RX ADMIN — DIPHENHYDRAMINE HYDROCHLORIDE 50 MG: 50 INJECTION, SOLUTION INTRAMUSCULAR; INTRAVENOUS at 15:40

## 2021-09-02 RX ADMIN — ACETAMINOPHEN 1000 MG: 500 TABLET ORAL at 13:37

## 2021-09-02 RX ADMIN — KETOROLAC TROMETHAMINE 30 MG: 30 INJECTION, SOLUTION INTRAMUSCULAR; INTRAVENOUS at 15:40

## 2021-09-02 RX ADMIN — ENOXAPARIN SODIUM 40 MG: 40 INJECTION SUBCUTANEOUS at 17:28

## 2021-09-02 RX ADMIN — TRAMADOL HYDROCHLORIDE 50 MG: 50 TABLET, FILM COATED ORAL at 20:39

## 2021-09-02 RX ADMIN — PRAVASTATIN SODIUM 20 MG: 20 TABLET ORAL at 20:40

## 2021-09-02 RX ADMIN — METOPROLOL SUCCINATE 100 MG: 100 TABLET, FILM COATED, EXTENDED RELEASE ORAL at 20:40

## 2021-09-02 RX ADMIN — TRAZODONE HYDROCHLORIDE 50 MG: 50 TABLET ORAL at 20:40

## 2021-09-02 RX ADMIN — Medication 10 ML: at 20:41

## 2021-09-02 RX ADMIN — METOCLOPRAMIDE HYDROCHLORIDE 10 MG: 5 INJECTION INTRAMUSCULAR; INTRAVENOUS at 15:41

## 2021-09-02 RX ADMIN — ALUMINUM HYDROXIDE, MAGNESIUM HYDROXIDE, AND SIMETHICONE: 200; 200; 20 SUSPENSION ORAL at 13:36

## 2021-09-02 RX ADMIN — IOPAMIDOL 75 ML: 755 INJECTION, SOLUTION INTRAVENOUS at 11:30

## 2021-09-02 RX ADMIN — IPRATROPIUM BROMIDE 0.5 MG: 0.5 SOLUTION RESPIRATORY (INHALATION) at 18:27

## 2021-09-02 RX ADMIN — ARFORMOTEROL TARTRATE 15 MCG: 15 SOLUTION RESPIRATORY (INHALATION) at 18:26

## 2021-09-02 RX ADMIN — SODIUM CHLORIDE 1000 ML: 9 INJECTION, SOLUTION INTRAVENOUS at 10:12

## 2021-09-02 RX ADMIN — ALLOPURINOL 100 MG: 100 TABLET ORAL at 17:28

## 2021-09-02 RX ADMIN — NITROGLYCERIN 0.4 MG: 0.4 TABLET SUBLINGUAL at 14:50

## 2021-09-02 ASSESSMENT — PAIN SCALES - GENERAL
PAINLEVEL_OUTOF10: 4
PAINLEVEL_OUTOF10: 2
PAINLEVEL_OUTOF10: 0
PAINLEVEL_OUTOF10: 6
PAINLEVEL_OUTOF10: 7
PAINLEVEL_OUTOF10: 0
PAINLEVEL_OUTOF10: 0

## 2021-09-02 ASSESSMENT — PAIN DESCRIPTION - ORIENTATION: ORIENTATION: MID

## 2021-09-02 ASSESSMENT — PAIN DESCRIPTION - LOCATION
LOCATION: CHEST
LOCATION: CHEST;HEAD

## 2021-09-02 ASSESSMENT — PAIN DESCRIPTION - PAIN TYPE
TYPE: ACUTE PAIN
TYPE: ACUTE PAIN

## 2021-09-02 ASSESSMENT — PAIN DESCRIPTION - FREQUENCY: FREQUENCY: CONTINUOUS

## 2021-09-02 NOTE — H&P
[address] [place], [place] [address]ist Group   History and Physical      CHIEF COMPLAINT:  Chest Pain    History of Present Illness: Stephanie Roman is a 64 y.o. male with a history of hypertension, diabetes, GERD and COPD. He presents with chest pain that he states started last night approximately 6:30 pm.  He states that the chest pain is mid-center and bilateral lungs that is constant and radiating into his neck and head. Patient states bilateral neck pain is radiating into his head. Pain is relieved by nothing. Patient denies any illicit drug use, but is a pack a day cigarette smoker, and admits to drinking a pint of vodka 3-4 times per week. Patient states last drink was two weeks ago. Informant(s) for H&P: Provided by patient    REVIEW OF SYSTEMS:  no fevers, chills, sob, n/v, vision/hearing changes, wt changes, hot/cold flashes, other open skin lesions, diarrhea, constipation, dysuria/hematuria unless noted in HPI. Complete ROS performed with the patient and is otherwise negative. PMH:  Past Medical History:   Diagnosis Date    Diabetes mellitus ([address]     Hyperlipidemia     Hypertension        Surgical History:  Past Surgical History:   Procedure Laterality Date    APPENDECTOMY      COLONOSCOPY      LUNG SURGERY      from scar tissue    NECK SURGERY N/A 2/5/2020    EXCISION POSTERIOR SOFT TISSUE NEOPLASM NECK (CPT 13265) performed by Yvonne Salvador MD at [address] [facility] [address]       Medications Prior to Admission:    Prior to Admission medications    Medication Sig Start Date End Date Taking?  Authorizing Provider   metoprolol succinate (TOPROL XL) 100 MG extended release tablet Take 1 tablet by mouth 2 times daily 3/25/21   Julia Lindquist MD   melatonin 5 MG TBDP disintegrating tablet Take 2 tablets by mouth nightly 3/25/21   Julia Lindquist MD   nicotine (NICODERM CQ) 21 MG/24HR Place 1 patch onto the skin daily 3/25/21   Julia Lindquist MD   traZODone (DESYREL) 50 MG tablet Take 1 tablet by mouth nightly 3/25/21   Suzanne Shine MD   DULoxetine (CYMBALTA) 20 MG extended release capsule Take 1 capsule by mouth daily 3/25/21   Suzanne Shine MD   allopurinol (ZYLOPRIM) 300 MG tablet Take by mouth 4/23/18   Historical Provider, MD   metFORMIN (GLUCOPHAGE) 500 MG tablet Take by mouth 2 times daily (with meals)  4/23/18   Historical Provider, MD   omeprazole (PRILOSEC) 40 MG delayed release capsule Take by mouth 4/28/15   Historical Provider, MD   JANUVIA 50 MG tablet TK 1 T PO QD 1/21/20   Historical Provider, MD   pravastatin (PRAVACHOL) 20 MG tablet TK 1 T PO QD 1/10/20   Historical Provider, MD Freeman Lizandro ELLIPTA 62.5-25 MCG/INH AEPB inhaler INL 1 PUFF PO D 12/11/19   Historical Provider, MD       Allergies:    Patient has no known allergies. Social History:    reports that he has been smoking cigarettes. He has been smoking about 1.00 pack per day. He has never used smokeless tobacco. He reports current alcohol use of about 5.0 standard drinks of alcohol per week. He reports that he does not use drugs. Family History:   family history includes COPD in his mother; Heart Disease in his father; No Known Problems in his sister.      PHYSICAL EXAM:  Vitals:  BP (!) 128/95   Pulse 101   Temp 98.4 °F (36.9 °C) (Temporal)   Resp 23   Ht 5' 11\" (1.803 m)   Wt 235 lb (106.6 kg)   SpO2 95%   BMI 32.78 kg/m²     General Appearance: alert and oriented to person, place and time and in no acute distress  Skin: warm and dry  Head: normocephalic and atraumatic  Eyes: pupils equal, round, and reactive to light, conjunctivae normal  Neck: neck supple and non tender without mass   Pulmonary/Chest: rhonchi noted left lobes but right appeared clear, no wheezes, normal air movement, no respiratory distress  Cardiovascular: normal rate, normal rhythm, and no carotid bruits  Abdomen: soft, non-tender, non-distended, normal bowel sounds, no masses or organomegaly  Extremities: no cyanosis, no clubbing and no edema  Neurologic: no cranial nerve deficit and speech normal, complaints of headache    LABS:  Recent Labs     09/02/21 0958      K 5.0   CL 98   CO2 24   BUN 11   CREATININE 0.6*   GLUCOSE 132*   CALCIUM 9.4       Recent Labs     09/02/21 0958   WBC 14.7*   RBC 3.91   HGB 14.6   HCT 42.5   .7*   MCH 37.3*   MCHC 34.4   RDW 13.3      MPV 10.6       No results for input(s): POCGLU in the last 72 hours. Hepatic Function Panel:    Lab Results   Component Value Date    ALKPHOS 155 09/02/2021    ALT 46 09/02/2021    AST 57 09/02/2021    PROT 7.3 09/02/2021    BILITOT 0.6 09/02/2021    LABALBU 3.6 09/02/2021     Magnesium:    Lab Results   Component Value Date    MG 1.7 09/02/2021        Troponin [[id number]] (Abnormal)    Collected: 09/02/21 1215    Updated: 09/02/21 1249    Specimen Source: Blood     Troponin, High Sensitivity 15 High  ng/L    Comment: High Sensitivity Troponin values cannot be compared with   other Troponin methodologies. Patients with high levels of Biotin oral intake (i.e. >5 mg/day)   may have falsely decreased Troponin levels. Samples collected   within 8 hours of biotin intake may require additional information   for diagnosis. Troponin [[id number]] (Abnormal)    Collected: 09/02/21 0958    Updated: 09/02/21 1216    Specimen Source: Blood     Troponin, High Sensitivity 16 High  ng/L    Comment: High Sensitivity Troponin values cannot be compared with   other Troponin methodologies. Patients with high levels of Biotin oral intake (i.e. >5 mg/day)   may have falsely decreased Troponin levels. Samples collected   within 8 hours of biotin intake may require additional information   for diagnosis.              Radiology: CT HEAD WO CONTRAST    Result Date: 9/2/2021  EXAMINATION: CT OF THE HEAD WITHOUT CONTRAST  9/2/2021 11:04 am TECHNIQUE: CT of the head was performed without the administration of intravenous contrast. Dose modulation, iterative reconstruction, and/or weight based adjustment of the mA/kV was utilized to reduce the radiation dose to as low as reasonably achievable. COMPARISON: CT of the head March 22 HISTORY: ORDERING SYSTEM PROVIDED HISTORY: Evaluate intracranial abnormality TECHNOLOGIST PROVIDED HISTORY: Has a \"code stroke\" or \"stroke alert\" been called? ->No Reason for exam:->Evaluate intracranial abnormality Decision Support Exception - unselect if not a suspected or confirmed emergency medical condition->Emergency Medical Condition (MA) FINDINGS: BRAIN/VENTRICLES: There is no acute intracranial hemorrhage, mass effect or midline shift. No abnormal extra-axial fluid collection. The gray-white differentiation is maintained without evidence of an acute infarct. There is no evidence of hydrocephalus. ORBITS: The visualized portion of the orbits demonstrate no acute abnormality. SINUSES: The left maxillary, left ethmoid, and left frontal sinus are opacified. Fluid identified in the left mastoid air cell. The remaining visualized paranasal sinuses and mastoid air cells demonstrate no acute abnormality. SOFT TISSUES/SKULL:  No acute abnormality of the visualized skull or soft tissues. Opacification of the left maxillary, ethmoid, and frontal sinuses. Finding may be secondary to sinusitis. No intracranial hemorrhage. No acute intracranial findings. CT ABDOMEN PELVIS W IV CONTRAST Additional Contrast? None    Result Date: 9/2/2021  EXAMINATION: CT OF THE ABDOMEN AND PELVIS WITH CONTRAST 9/2/2021 11:04 am TECHNIQUE: CT of the abdomen and pelvis was performed with the administration of intravenous contrast. Multiplanar reformatted images are provided for review. Dose modulation, iterative reconstruction, and/or weight based adjustment of the mA/kV was utilized to reduce the radiation dose to as low as reasonably achievable. COMPARISON: None.  HISTORY: ORDERING SYSTEM PROVIDED HISTORY: trauma TECHNOLOGIST PROVIDED HISTORY: Reason for exam:->trauma Additional Contrast?->None Decision Support Exception - unselect if not a suspected or confirmed emergency medical condition->Emergency Medical Condition (MA) FINDINGS: Lung Bases: Bilateral compressive atelectasis. Liver: No hepatic lesions identified. No traumatic lacerations. Bile ducts:  Gallbladder is unremarkable. No evidence of intrahepatic or extrahepatic biliary dilatation. Pancreas: No pancreatic lesions. The pancreatic duct is not dilated. Adrenal glands: 3.7 cm right adrenal gland lesion identified. Left renal gland is normal in appearance. Kidneys: No evidence of hydronephrosis. 3 cm exophytic hyperattenuating lesion identified in the right upper pole. Spleen: No enhancing lesions. No traumatic lacerations. Bowel: No evidence of bowel obstruction. The appendix is not directly visualized, no secondary signs of acute appendicitis. Peritoneum/Retroperitoneum: No abnormal pelvic lymphadenopathy. Pelvis:   Bladder is incompletely distended Bones/Soft tissues:   No aggressive osseous lesions. Focal 3.7 cm right adrenal gland lesion. Further surgical consultation recommended. Further evaluation of 3 cm exophytic lesion in right kidney may be obtained with renal ultrasound. No traumatic injuries in the abdomen or pelvis. XR CHEST 1 VIEW    Result Date: 9/2/2021  EXAMINATION: ONE XRAY VIEW OF THE CHEST 9/2/2021 11:06 am COMPARISON: 10/12/2018 HISTORY: ORDERING SYSTEM PROVIDED HISTORY: pain TECHNOLOGIST PROVIDED HISTORY: Reason for exam:->pain FINDINGS: Heart size is normal.  There is some scarring left mid hemithorax and in the left costophrenic angle. There are no acute infiltrates or effusions. No acute process     CTA PULMONARY W CONTRAST    Result Date: 9/2/2021  EXAMINATION: CTA OF THE CHEST 9/2/2021 11:16 TECHNIQUE: CTA of the chest was performed after the administration of intravenous contrast.  Multiplanar reformatted images are provided for review. MIP images are provided for review. Dose modulation, iterative reconstruction, and/or weight based adjustment of the mA/kV was utilized to reduce the radiation dose to as low as reasonably achievable. COMPARISON: Noncontrast Chest CT 03/20/2021 HISTORY: ORDERING SYSTEM PROVIDED HISTORY: trauma: chest pain: rule out PE TECHNOLOGIST PROVIDED HISTORY: Reason for exam:->trauma: chest pain: rule out PE Decision Support Exception - unselect if not a suspected or confirmed emergency medical condition->Emergency Medical Condition (MA) FINDINGS: LUNGS/PLEURA: There is trace to minimal dependent bibasilar subsegmental atelectasis, less likely infiltrate, progressively lessening to both lung apices, and overall somewhat worse on the left, where there is unchanged background pulmonary scarring/irregular pleural thickening. Rare scattered calcified granuloma are most notable at the posterior right lung base. CARDIOVASCULAR: Negative for pulmonary embolism. Heart size and pulmonary vascularity are top-normal in prominence. Trace to mild/moderate, predominantly hard atherosclerotic plaque is scattered throughout the visualized aorta and its major branches, including the coronary arteries, not significantly changed. LYMPH NODES: Negative for lymphadenopathy throughout the visualized volume, by CT-criteria. OTHER MEDIASTINUM: There is trace to minimal scattered gaseous distension of the visualized esophagus. UPPER ABDOMEN: A previously-reported, macrolobulated, hypodense right adrenal mass is incompletely visualized/characterized, but appears grossly unchanged. There is grossly unchanged, at least mild, hepatomegaly and equivalent diffuse hepatic steatosis, also incompletely visualized/characterized. MUSCULOSKELETAL: There is minimal bilateral gynecomastia. Multiple remote left-sided rib fractures are grossly unchanged.  There are grossly-unchanged, chronic, multilevel, asymmetric vertebral compression deformities, related vertebral column curvature/alignment abnormalities, and osseous degenerative disease. No other clinically-significant changes are noted. .     1. Negative for pulmonary embolism. 2.  Trace to minimal dependent bibasilar subsegmental atelectasis, less likely infiltrate, progressively lessening to both lung apices, and overall somewhat worse on the left, where there is unchanged background pulmonary scarring/irregular pleural thickening. 3.  A previously-reported, macrolobulated, hypodense right adrenal mass is incompletely visualized/characterized, but appears grossly unchanged. 4.  Grossly unchanged, at least mild, hepatomegaly and equivalent diffuse hepatic steatosis, also incompletely visualized/characterized. 5.  Otherwise, no significant change. Lore Sheldon RECOMMENDATIONS: 1. Right adrenal mass: Per ACR guidelines: Recommend dedicated adrenal CT, without and with contrast, for further characterization. In the absence of a cancer history, consider surgical resection. In the presence of cancer history, consider histologic sampling and/or PET/CT. .       EKG:Sinus tachycardia , PACs. Left axis deviation  Non specific ST-T changes. Abnormal ECG  Confirmed by Richard Marie ([id number]) on 9/2/2021 9:24:29 AM     ASSESSMENT:      Active Problems:    Atypical chest pain  Resolved Problems:    * No resolved hospital problems. *      PLAN:    1. Atypical chest pain - admit for observation telemetry floor - mid-sternal, both lung discomfort and radiating up neck into head - holding BB until after some urine obtained - Aspirin - Nitro SL - stress test ordered - troponin obtained - no shortness of breath     2. Hypertension - Blood pressure unconcontrolled most likely secondary to non-compliance with  medications at home    3. Diabetes mellitus type 2 - On Metformin has not been taking - will add medium dose Humalog sliding scale     4. Hyperlipidemia - On a statin however he has not been taking due to affording medications     5.  COPD - No acute exacerbation - rhonchi noted in left lobes - usually on Anoro Ellipta and resumed his home dose     6. GERD - Will order Protonix    7. Incidental finding - per CTA scan  focal 3.7 cm right adrenal gland lesion. Further surgical consultation recommended. Further evaluation of 3 cm exophytic lesion in right kidney may be obtained with renal ultrasound - plasma free metanephrines, urine metanephrines evaluating for  pheochromocytoma - general surgery consulted      8. Depression - continue  Cymbalta - pleasant     9. Alcohol abuse - counseled regarding alcohol use - EtOH level <5    10. Tobacco use - tobacco counseling given - ordered  Nicotine patch     11. Morbid obese - recommend lifestyle modification     12.  consult for social issues -recently retired and unable to afford his medication  has no healthcare insurance    Code Status: Full Code  DVT prophylaxis: Lovenox    NOTE: This report was transcribed using voice recognition software.  Every effort was made to ensure accuracy; however, inadvertent computerized transcription errors may be present.     Electronically signed by JOSSIE Martinez CNP on 9/2/2021 at 2:08 PM

## 2021-09-02 NOTE — ED PROVIDER NOTES
negative.      --------------------------------------------- PAST HISTORY ---------------------------------------------  Past Medical History:  has a past medical history of Chronic obstructive pulmonary disease ([facility] Diabetes mellitus ([facility] Hyperlipidemia, and Hypertension. Past Surgical History:  has a past surgical history that includes Appendectomy; Lung surgery; Colonoscopy; and Neck surgery (N/A, 2/5/2020). Social History:  reports that he has been smoking cigarettes. He has been smoking about 1.00 pack per day. He has never used smokeless tobacco. He reports current alcohol use of about 5.0 standard drinks of alcohol per week. He reports that he does not use drugs. Family History: family history includes COPD in his mother; Heart Disease in his father; No Known Problems in his sister. The patients home medications have been reviewed. Allergies: Patient has no known allergies. ----------------------------------------PHYSICAL EXAM--------------------------------------  Constitutional:  Well developed, well nourished, no acute distress, non-toxic appearance   Eyes:  PERRL, conjunctiva normal, EOMI  HENT:  Atraumatic, external ears normal, nose normal, oropharynx moist. Neck- normal range of motion, no nuchal rigidity   Respiratory:  No respiratory distress, normal breath sounds, no rales, no wheezing   Cardiovascular:  Normal rate, normal rhythm, no murmurs, no gallops, no rubs. Radial and DP pulses 2+ bilaterally. GI:  Soft, nondistended, normal bowel sounds, nontender, no organomegaly, no mass, no rebound, no guarding   :  No costovertebral angle tenderness   Musculoskeletal:  No edema, no tenderness, no deformities. Back- no tenderness  Integument:  Well hydrated, no visible rash. Adequate perfusion. Ecchymosis left flank. Lymphatic:  No cervical lymphadenopathy noted   Neurologic:  Alert & oriented x 3, CN 2-12 normal, no focal deficits noted. DTRs 2+ bilateral patellar. Psychiatric:  Speech and behavior appropriate       -------------------------------------------------- RESULTS -------------------------------------------------  I have personally reviewed all laboratory and imaging results for this patient. Results are listed below.      LABS:  Results for orders placed or performed during the hospital encounter of 09/02/21   CBC auto differential   Result Value Ref Range    WBC 14.7 (H) 4.5 - 11.5 E9/L    RBC 3.91 3.80 - 5.80 E12/L    Hemoglobin 14.6 12.5 - 16.5 g/dL    Hematocrit 42.5 37.0 - 54.0 %    .7 (H) 80.0 - 99.9 fL    MCH 37.3 (H) 26.0 - 35.0 pg    MCHC 34.4 32.0 - 34.5 %    RDW 13.3 11.5 - 15.0 fL    Platelets 127 394 - 204 E9/L    MPV 10.6 7.0 - 12.0 fL    Neutrophils % 76.5 43.0 - 80.0 %    Lymphocytes % 18.3 (L) 20.0 - 42.0 %    Monocytes % 5.2 2.0 - 12.0 %    Eosinophils % 1.9 0.0 - 6.0 %    Basophils % 0.5 0.0 - 2.0 %    Neutrophils Absolute 11.32 (H) 1.80 - 7.30 E9/L    Lymphocytes Absolute 2.65 1.50 - 4.00 E9/L    Monocytes Absolute 0.74 0.10 - 0.95 E9/L    Eosinophils Absolute 0.00 (L) 0.05 - 0.50 E9/L    Basophils Absolute 0.00 0.00 - 0.20 E9/L    Anisocytosis 1+     Poikilocytes 1+     Ovalocytes 1+     Tear Drop Cells 1+    Comprehensive Metabolic Panel   Result Value Ref Range    Sodium 132 132 - 146 mmol/L    Potassium 5.0 3.5 - 5.0 mmol/L    Chloride 98 98 - 107 mmol/L    CO2 24 22 - 29 mmol/L    Anion Gap 10 7 - 16 mmol/L    Glucose 132 (H) 74 - 99 mg/dL    BUN 11 6 - 23 mg/dL    CREATININE 0.6 (L) 0.7 - 1.2 mg/dL    GFR Non-African American >60 >=60 mL/min/1.73    GFR African American >60     Calcium 9.4 8.6 - 10.2 mg/dL    Total Protein 7.3 6.4 - 8.3 g/dL    Albumin 3.6 3.5 - 5.2 g/dL    Total Bilirubin 0.6 0.0 - 1.2 mg/dL    Alkaline Phosphatase 155 (H) 40 - 129 U/L    ALT 46 (H) 0 - 40 U/L    AST 57 (H) 0 - 39 U/L   Troponin   Result Value Ref Range    Troponin, High Sensitivity 16 (H) 0 - 11 ng/L   Brain Natriuretic Peptide   Result Value Ref Range    Pro- (H) 0 - 125 pg/mL   Serum Drug Screen   Result Value Ref Range    Ethanol Lvl <10 mg/dL    Acetaminophen Level <5.0 (L) 10.0 - 36.7 mcg/mL    Salicylate, Serum <9.6 0.0 - 30.0 mg/dL    TCA Scrn NEGATIVE Cutoff:300 ng/mL   Magnesium   Result Value Ref Range    Magnesium 1.7 1.6 - 2.6 mg/dL   Troponin   Result Value Ref Range    Troponin, High Sensitivity 15 (H) 0 - 11 ng/L   EKG 12 Lead   Result Value Ref Range    Ventricular Rate 102 BPM    Atrial Rate 102 BPM    P-R Interval 150 ms    QRS Duration 86 ms    Q-T Interval 364 ms    QTc Calculation (Bazett) 474 ms    P Axis 55 degrees    R Axis -53 degrees    T Axis 93 degrees   EKG 12 Lead   Result Value Ref Range    Ventricular Rate 99 BPM    Atrial Rate 99 BPM    P-R Interval 150 ms    QRS Duration 80 ms    Q-T Interval 366 ms    QTc Calculation (Bazett) 469 ms    P Axis 84 degrees    R Axis -41 degrees    T Axis 95 degrees       RADIOLOGY:  Interpreted by Radiologist.  CT HEAD WO CONTRAST   Final Result   Opacification of the left maxillary, ethmoid, and frontal sinuses. Finding   may be secondary to sinusitis. No intracranial hemorrhage. No acute intracranial findings. CTA PULMONARY W CONTRAST   Final Result   1. Negative for pulmonary embolism. 2.  Trace to minimal dependent bibasilar subsegmental atelectasis, less   likely infiltrate, progressively lessening to both lung apices, and overall   somewhat worse on the left, where there is unchanged background pulmonary   scarring/irregular pleural thickening. 3.  A previously-reported, macrolobulated, hypodense right adrenal mass is   incompletely visualized/characterized, but appears grossly unchanged. 4.  Grossly unchanged, at least mild, hepatomegaly and equivalent diffuse   hepatic steatosis, also incompletely visualized/characterized. 5.  Otherwise, no significant change. Mali Graven RECOMMENDATIONS:   1.   Right adrenal mass: Per ACR guidelines: Recommend dedicated adrenal CT,   without and with contrast, for further characterization. In the absence of a   cancer history, consider surgical resection. In the presence of cancer   history, consider histologic sampling and/or PET/CT. Estrellita Perales CT ABDOMEN PELVIS W IV CONTRAST Additional Contrast? None   Final Result   Focal 3.7 cm right adrenal gland lesion. Further surgical consultation   recommended. Further evaluation of 3 cm exophytic lesion in right kidney may be obtained   with renal ultrasound. No traumatic injuries in the abdomen or pelvis. XR CHEST 1 VIEW   Final Result   No acute process               EKG Interpretation  Time: 8:36 AM EDT  Rhythm: sinus tachycardia  Rate: 102  Axis: normal  Conduction: left anterior fasciclar block  ST Segments: no acute change  T Waves: non specific changes  Clinical Impression: non-specific EKG, sinus tachycardia   Comparison to prior EKG: changes compared to previous EKG      ------------------------- NURSING NOTES AND VITALS REVIEWED ---------------------------  The nursing notes within the ED encounter and vital signs as below have been reviewed by myself. /78   Pulse 106   Temp 98.4 °F (36.9 °C) (Temporal)   Resp 16   Ht 5' 11\" (1.803 m)   Wt 235 lb (106.6 kg)   SpO2 97%   BMI 32.78 kg/m²   Oxygen Saturation Interpretation: Normal      The patients available past medical records and past encounters were reviewed.         ------------------------------ ED COURSE/MEDICAL DECISION MAKING----------------------  Medications   nitroGLYCERIN (NITROSTAT) SL tablet 0.4 mg (0.4 mg SubLINGual Given 9/2/21 1450)   traMADol (ULTRAM) tablet 50 mg (has no administration in time range)   0.9 % sodium chloride bolus (0 mLs IntraVENous Stopped 9/2/21 1205)   iopamidol (ISOVUE-370) 76 % injection 75 mL (75 mLs IntraVENous Given 9/2/21 1130)   aluminum & magnesium hydroxide-simethicone (MAALOX) 30 mL, lidocaine viscous hcl (XYLOCAINE) 5 mL (GI COCKTAIL) ( Oral Given 21 1336)   acetaminophen (TYLENOL) tablet 1,000 mg (1,000 mg Oral Given 21 1337)   ketorolac (TORADOL) injection 30 mg (30 mg IntraVENous Given 21 1540)   metoclopramide (REGLAN) injection 10 mg (10 mg IntraVENous Given 21 154)   diphenhydrAMINE (BENADRYL) injection 50 mg (50 mg IntraVENous Given 21)   0.9 % sodium chloride bolus (1,000 mLs IntraVENous New Bag 21)           Procedures:   none      Medical Decision Making:      HEART Score For Major Cardiac Events  (Max Score 10 Points)  HISTORY       [x]   Slightly Suspicious  0       []   Moderately Suspicious  +1       []   Highly Suspicious  +2    EK point: No ST deviation but LBBB, LVH repolarization changes (ex:digoxin);  2 points: ST deviation not due to LBBB, LVH or digoxin         []   Normal  0       [x]   Nonspecific Repolarization Disturbance  +1       []   Significant ST Depression  +2    AGE       []   <45  0       [x]   45-64  +1       []    >65  +2    RISK FACTORS:  1. HTN    2. Hypercholesterolemia    3. DM     4. Cigarette smoking (current or cessation < 3 mos)    5. Positive family history  (parent or sibling with CVD before age 72). 6. Obesity (BMI >30kg/m2)         []   No Risk factors Known  0       []   1-2 Risk Factors  +1       [x]   >3 Risk Factors or History of Atherosclerotic Disease  +2    INITIAL TROPONIN       [x]   < Normal Limit   0       []   1-3 x Normal Limit   +1       []   >3 x Normal Limit   +2     -----------------------------------------------------------------------------------------------------------------  SCORE TOTAL:  4 POINTS     Low Score:         (0-3 Points), risk of MACE of 0.9-1.7% (discuss d/c home with f/u)  Mod Score:         (4-6 Points), risk of MACE of 12-16.6% (discuss admission for further testing)  High Score:        (7-10 Points), risk of MACE of 50-65% (Admit ALL as they are candidates for early invasive measures)    ? will need to monitor

## 2021-09-02 NOTE — ED NOTES
PATIENT STATES HE IS STILL HAVING CHEST PAIN WITH RADIATION TO HEAD 6/10, THEN STATES HEADACHE IS GETTING MUCH WORSE.       Fawad Hartman RN  09/02/21 [id number]

## 2021-09-03 ENCOUNTER — APPOINTMENT (OUTPATIENT)
Dept: NUCLEAR MEDICINE | Age: 61
End: 2021-09-03
Payer: COMMERCIAL

## 2021-09-03 ENCOUNTER — APPOINTMENT (OUTPATIENT)
Dept: NON INVASIVE DIAGNOSTICS | Age: 61
End: 2021-09-03
Payer: COMMERCIAL

## 2021-09-03 ENCOUNTER — APPOINTMENT (OUTPATIENT)
Dept: MRI IMAGING | Age: 61
End: 2021-09-03
Payer: COMMERCIAL

## 2021-09-03 LAB
ALBUMIN SERPL-MCNC: 3.5 G/DL (ref 3.5–5.2)
ALP BLD-CCNC: 123 U/L (ref 40–129)
ALT SERPL-CCNC: 40 U/L (ref 0–40)
ANION GAP SERPL CALCULATED.3IONS-SCNC: 8 MMOL/L (ref 7–16)
AST SERPL-CCNC: 53 U/L (ref 0–39)
BASOPHILS ABSOLUTE: 0.09 E9/L (ref 0–0.2)
BASOPHILS RELATIVE PERCENT: 0.8 % (ref 0–2)
BILIRUB SERPL-MCNC: 0.6 MG/DL (ref 0–1.2)
BUN BLDV-MCNC: 12 MG/DL (ref 6–23)
CALCIUM SERPL-MCNC: 9.3 MG/DL (ref 8.6–10.2)
CHLORIDE BLD-SCNC: 99 MMOL/L (ref 98–107)
CO2: 26 MMOL/L (ref 22–29)
CREAT SERPL-MCNC: 0.7 MG/DL (ref 0.7–1.2)
EKG ATRIAL RATE: 99 BPM
EKG P AXIS: 84 DEGREES
EKG P-R INTERVAL: 150 MS
EKG Q-T INTERVAL: 366 MS
EKG QRS DURATION: 80 MS
EKG QTC CALCULATION (BAZETT): 469 MS
EKG R AXIS: -41 DEGREES
EKG T AXIS: 95 DEGREES
EKG VENTRICULAR RATE: 99 BPM
EOSINOPHILS ABSOLUTE: 0.27 E9/L (ref 0.05–0.5)
EOSINOPHILS RELATIVE PERCENT: 2.4 % (ref 0–6)
GFR AFRICAN AMERICAN: >60
GFR NON-AFRICAN AMERICAN: >60 ML/MIN/1.73
GLUCOSE BLD-MCNC: 112 MG/DL (ref 74–99)
HCT VFR BLD CALC: 41.1 % (ref 37–54)
HEMOGLOBIN: 14 G/DL (ref 12.5–16.5)
IMMATURE GRANULOCYTES #: 0.05 E9/L
IMMATURE GRANULOCYTES %: 0.4 % (ref 0–5)
LV EF: 60 %
LVEF MODALITY: NORMAL
LYMPHOCYTES ABSOLUTE: 2.77 E9/L (ref 1.5–4)
LYMPHOCYTES RELATIVE PERCENT: 24.8 % (ref 20–42)
MCH RBC QN AUTO: 37.2 PG (ref 26–35)
MCHC RBC AUTO-ENTMCNC: 34.1 % (ref 32–34.5)
MCV RBC AUTO: 109.3 FL (ref 80–99.9)
METER GLUCOSE: 111 MG/DL (ref 74–99)
METER GLUCOSE: 118 MG/DL (ref 74–99)
METER GLUCOSE: 120 MG/DL (ref 74–99)
METER GLUCOSE: 150 MG/DL (ref 74–99)
MONOCYTES ABSOLUTE: 0.84 E9/L (ref 0.1–0.95)
MONOCYTES RELATIVE PERCENT: 7.5 % (ref 2–12)
NEUTROPHILS ABSOLUTE: 7.14 E9/L (ref 1.8–7.3)
NEUTROPHILS RELATIVE PERCENT: 64.1 % (ref 43–80)
OVALOCYTES: ABNORMAL
PDW BLD-RTO: 13.3 FL (ref 11.5–15)
PLATELET # BLD: 203 E9/L (ref 130–450)
PMV BLD AUTO: 10.4 FL (ref 7–12)
POIKILOCYTES: ABNORMAL
POTASSIUM REFLEX MAGNESIUM: 4.5 MMOL/L (ref 3.5–5)
RBC # BLD: 3.76 E12/L (ref 3.8–5.8)
SODIUM BLD-SCNC: 133 MMOL/L (ref 132–146)
TOTAL PROTEIN: 7 G/DL (ref 6.4–8.3)
WBC # BLD: 11.2 E9/L (ref 4.5–11.5)

## 2021-09-03 PROCEDURE — G0378 HOSPITAL OBSERVATION PER HR: HCPCS

## 2021-09-03 PROCEDURE — 85025 COMPLETE CBC W/AUTO DIFF WBC: CPT

## 2021-09-03 PROCEDURE — 78452 HT MUSCLE IMAGE SPECT MULT: CPT | Performed by: INTERNAL MEDICINE

## 2021-09-03 PROCEDURE — A9500 TC99M SESTAMIBI: HCPCS | Performed by: RADIOLOGY

## 2021-09-03 PROCEDURE — 94640 AIRWAY INHALATION TREATMENT: CPT

## 2021-09-03 PROCEDURE — 2580000003 HC RX 258: Performed by: NURSE PRACTITIONER

## 2021-09-03 PROCEDURE — 6360000004 HC RX CONTRAST MEDICATION: Performed by: RADIOLOGY

## 2021-09-03 PROCEDURE — 36415 COLL VENOUS BLD VENIPUNCTURE: CPT

## 2021-09-03 PROCEDURE — 93017 CV STRESS TEST TRACING ONLY: CPT

## 2021-09-03 PROCEDURE — 6360000002 HC RX W HCPCS: Performed by: NURSE PRACTITIONER

## 2021-09-03 PROCEDURE — 93018 CV STRESS TEST I&R ONLY: CPT | Performed by: INTERNAL MEDICINE

## 2021-09-03 PROCEDURE — A9577 INJ MULTIHANCE: HCPCS | Performed by: RADIOLOGY

## 2021-09-03 PROCEDURE — 80053 COMPREHEN METABOLIC PANEL: CPT

## 2021-09-03 PROCEDURE — 74183 MRI ABD W/O CNTR FLWD CNTR: CPT

## 2021-09-03 PROCEDURE — 82746 ASSAY OF FOLIC ACID SERUM: CPT

## 2021-09-03 PROCEDURE — APPSS30 APP SPLIT SHARED TIME 16-30 MINUTES: Performed by: NURSE PRACTITIONER

## 2021-09-03 PROCEDURE — 3430000000 HC RX DIAGNOSTIC RADIOPHARMACEUTICAL: Performed by: RADIOLOGY

## 2021-09-03 PROCEDURE — 82607 VITAMIN B-12: CPT

## 2021-09-03 PROCEDURE — 99213 OFFICE O/P EST LOW 20 MIN: CPT | Performed by: SURGERY

## 2021-09-03 PROCEDURE — 93016 CV STRESS TEST SUPVJ ONLY: CPT | Performed by: INTERNAL MEDICINE

## 2021-09-03 PROCEDURE — 99225 PR SBSQ OBSERVATION CARE/DAY 25 MINUTES: CPT | Performed by: STUDENT IN AN ORGANIZED HEALTH CARE EDUCATION/TRAINING PROGRAM

## 2021-09-03 PROCEDURE — 82962 GLUCOSE BLOOD TEST: CPT

## 2021-09-03 PROCEDURE — 6370000000 HC RX 637 (ALT 250 FOR IP): Performed by: INTERNAL MEDICINE

## 2021-09-03 PROCEDURE — 6370000000 HC RX 637 (ALT 250 FOR IP): Performed by: NURSE PRACTITIONER

## 2021-09-03 PROCEDURE — 82088 ASSAY OF ALDOSTERONE: CPT

## 2021-09-03 PROCEDURE — 78452 HT MUSCLE IMAGE SPECT MULT: CPT

## 2021-09-03 PROCEDURE — 96372 THER/PROPH/DIAG INJ SC/IM: CPT

## 2021-09-03 PROCEDURE — 84244 ASSAY OF RENIN: CPT

## 2021-09-03 RX ORDER — LANOLIN ALCOHOL/MO/W.PET/CERES
3 CREAM (GRAM) TOPICAL NIGHTLY PRN
Status: DISCONTINUED | OUTPATIENT
Start: 2021-09-03 | End: 2021-09-04

## 2021-09-03 RX ORDER — LANOLIN ALCOHOL/MO/W.PET/CERES
3 CREAM (GRAM) TOPICAL NIGHTLY PRN
Status: DISCONTINUED | OUTPATIENT
Start: 2021-09-03 | End: 2021-09-03

## 2021-09-03 RX ORDER — TRAMADOL HYDROCHLORIDE 50 MG/1
50 TABLET ORAL ONCE
Status: COMPLETED | OUTPATIENT
Start: 2021-09-03 | End: 2021-09-03

## 2021-09-03 RX ORDER — HYDROCODONE BITARTRATE AND ACETAMINOPHEN 5; 325 MG/1; MG/1
1 TABLET ORAL ONCE
Status: COMPLETED | OUTPATIENT
Start: 2021-09-03 | End: 2021-09-03

## 2021-09-03 RX ADMIN — ARFORMOTEROL TARTRATE 15 MCG: 15 SOLUTION RESPIRATORY (INHALATION) at 16:31

## 2021-09-03 RX ADMIN — Medication 10 MILLICURIE: at 07:02

## 2021-09-03 RX ADMIN — METOPROLOL SUCCINATE 100 MG: 100 TABLET, FILM COATED, EXTENDED RELEASE ORAL at 11:10

## 2021-09-03 RX ADMIN — IPRATROPIUM BROMIDE 0.5 MG: 0.5 SOLUTION RESPIRATORY (INHALATION) at 16:31

## 2021-09-03 RX ADMIN — TRAZODONE HYDROCHLORIDE 50 MG: 50 TABLET ORAL at 20:12

## 2021-09-03 RX ADMIN — GADOBENATE DIMEGLUMINE 20 ML: 529 INJECTION, SOLUTION INTRAVENOUS at 10:08

## 2021-09-03 RX ADMIN — ENOXAPARIN SODIUM 40 MG: 40 INJECTION SUBCUTANEOUS at 11:16

## 2021-09-03 RX ADMIN — METOPROLOL SUCCINATE 100 MG: 100 TABLET, FILM COATED, EXTENDED RELEASE ORAL at 20:12

## 2021-09-03 RX ADMIN — PANTOPRAZOLE SODIUM 40 MG: 40 TABLET, DELAYED RELEASE ORAL at 06:04

## 2021-09-03 RX ADMIN — SODIUM CHLORIDE, PRESERVATIVE FREE 10 ML: 5 INJECTION INTRAVENOUS at 11:10

## 2021-09-03 RX ADMIN — Medication 3 MG: at 20:23

## 2021-09-03 RX ADMIN — IPRATROPIUM BROMIDE 0.5 MG: 0.5 SOLUTION RESPIRATORY (INHALATION) at 20:28

## 2021-09-03 RX ADMIN — ARFORMOTEROL TARTRATE 15 MCG: 15 SOLUTION RESPIRATORY (INHALATION) at 06:43

## 2021-09-03 RX ADMIN — IPRATROPIUM BROMIDE 0.5 MG: 0.5 SOLUTION RESPIRATORY (INHALATION) at 06:43

## 2021-09-03 RX ADMIN — PRAVASTATIN SODIUM 20 MG: 20 TABLET ORAL at 20:12

## 2021-09-03 RX ADMIN — DULOXETINE HYDROCHLORIDE 20 MG: 20 CAPSULE, DELAYED RELEASE ORAL at 11:10

## 2021-09-03 RX ADMIN — HYDROCODONE BITARTRATE AND ACETAMINOPHEN 1 TABLET: 5; 325 TABLET ORAL at 14:02

## 2021-09-03 RX ADMIN — TRAMADOL HYDROCHLORIDE 50 MG: 50 TABLET, FILM COATED ORAL at 06:03

## 2021-09-03 RX ADMIN — Medication 30 MILLICURIE: at 09:48

## 2021-09-03 RX ADMIN — Medication 10 ML: at 20:13

## 2021-09-03 RX ADMIN — ALLOPURINOL 100 MG: 100 TABLET ORAL at 11:10

## 2021-09-03 RX ADMIN — REGADENOSON 0.4 MG: 0.08 INJECTION, SOLUTION INTRAVENOUS at 09:25

## 2021-09-03 RX ADMIN — Medication 10 ML: at 11:16

## 2021-09-03 ASSESSMENT — PAIN SCALES - GENERAL
PAINLEVEL_OUTOF10: 0
PAINLEVEL_OUTOF10: 8
PAINLEVEL_OUTOF10: 7
PAINLEVEL_OUTOF10: 7
PAINLEVEL_OUTOF10: 0

## 2021-09-03 ASSESSMENT — ENCOUNTER SYMPTOMS
ABDOMINAL DISTENTION: 0
EYE REDNESS: 0
BLOOD IN STOOL: 0
SHORTNESS OF BREATH: 0
DIARRHEA: 0
VOMITING: 0
EYE PAIN: 0
ABDOMINAL PAIN: 0
COUGH: 0
WHEEZING: 0
NAUSEA: 0
CONSTIPATION: 0
RHINORRHEA: 0
EYE ITCHING: 0
CHEST TIGHTNESS: 0
BACK PAIN: 0

## 2021-09-03 ASSESSMENT — PAIN DESCRIPTION - PAIN TYPE: TYPE: ACUTE PAIN

## 2021-09-03 ASSESSMENT — PAIN DESCRIPTION - LOCATION: LOCATION: FLANK

## 2021-09-03 NOTE — PROGRESS NOTES
Lexiscan Stress EKG Report:    Dx CP    Baseline EKG: normal sinus rhythm, nonspecific ST and T waves changes. Stress EKG: No ST-T changes. Arrhythmias: None. Symptoms: None. Summary:  Unremarkable lexiscan stress EKG. See separate report for stress perfusion results.      Ladi Elaine MD  Cardiologist  Cardiology, [facility]) Physicians

## 2021-09-03 NOTE — PROGRESS NOTES
[address] [place], [place] [address]ist   Progress Note    Admitting Date and Time: 9/2/2021  8:18 AM  Admit Dx: Syncope and collapse [R55]  Atypical chest pain [R07.89]  Chest pain, unspecified type [R07.9]  Lesion of adrenal gland ([address] [E27.9]    Subjective:    Pt seen after his stress test and MRI. Patient continues to complain of a headache. Patient will be given a Norco x1. Patient denies any blurred vision or dizziness. Patient stress test is negative and currently awaiting urine test.     ROS: denies fever, chills, cp, sob, n/v, HA unless stated above.      allopurinol  100 mg Oral Daily    DULoxetine  20 mg Oral Daily    metoprolol succinate  100 mg Oral BID    pantoprazole  40 mg Oral QAM AC    pravastatin  20 mg Oral Nightly    traZODone  50 mg Oral Nightly    sodium chloride flush  5-40 mL IntraVENous 2 times per day    enoxaparin  40 mg SubCUTAneous Daily    insulin lispro  0-12 Units SubCUTAneous TID WC    insulin lispro  0-6 Units SubCUTAneous Nightly    ipratropium  0.5 mg Nebulization 4x daily    And    Arformoterol Tartrate  15 mcg Nebulization BID     melatonin, 3 mg, Nightly PRN  nitroGLYCERIN, 0.4 mg, Q5 Min PRN  sodium chloride flush, 5-40 mL, PRN  sodium chloride, 25 mL, PRN  ondansetron, 4 mg, Q8H PRN   Or  ondansetron, 4 mg, Q6H PRN  polyethylene glycol, 17 g, Daily PRN  acetaminophen, 650 mg, Q6H PRN   Or  acetaminophen, 650 mg, Q6H PRN  glucose, 15 g, PRN  dextrose, 12.5 g, PRN  glucagon (rDNA), 1 mg, PRN  dextrose, 100 mL/hr, PRN         Objective:    /67   Pulse 88   Temp 98 °F (36.7 °C) (Oral)   Resp 18   Ht 5' 11\" (1.803 m)   Wt 240 lb 1.6 oz (108.9 kg)   SpO2 97%   BMI 33.49 kg/m²   General Appearance: alert and oriented to person, place and time and in no acute distress, obese  Skin: warm and dry  Head: normocephalic and atraumatic  Eyes: pupils equal, round, and reactive to light, extraocular eye movements intact, conjunctivae normal  Neck: neck supple and embolism. 2.  Trace to minimal dependent bibasilar subsegmental atelectasis, less   likely infiltrate, progressively lessening to both lung apices, and overall   somewhat worse on the left, where there is unchanged background pulmonary   scarring/irregular pleural thickening. 3.  A previously-reported, macrolobulated, hypodense right adrenal mass is   incompletely visualized/characterized, but appears grossly unchanged. 4.  Grossly unchanged, at least mild, hepatomegaly and equivalent diffuse   hepatic steatosis, also incompletely visualized/characterized. 5.  Otherwise, no significant change. Lelo Brian RECOMMENDATIONS:   1. Right adrenal mass: Per ACR guidelines: Recommend dedicated adrenal CT,   without and with contrast, for further characterization. In the absence of a   cancer history, consider surgical resection. In the presence of cancer   history, consider histologic sampling and/or PET/CT. Lelo Brian CT ABDOMEN PELVIS W IV CONTRAST Additional Contrast? None   Final Result   Focal 3.7 cm right adrenal gland lesion. Further surgical consultation   recommended. Further evaluation of 3 cm exophytic lesion in right kidney may be obtained   with renal ultrasound. No traumatic injuries in the abdomen or pelvis. XR CHEST 1 VIEW   Final Result   No acute process             Assessment:  Active Problems:    Chest pain  Resolved Problems:    * No resolved hospital problems. *      Plan:    1. Atypical chest pain  - mid-sternal, both lung discomfort and radiating up neck has improved - still complaining of headache - resumed Lopresssor - Aspirin - Nitro SL - stress test ordered - troponin obtained - no shortness of breath - stress test negative (low intermediate risk all cardiac perfusion study) - will continue monitor      2. Hypertension - blood pressure well controlled and stable     3.  Diabetes mellitus type 2 - holding Metformin - patient admits to not taking it - Medium dose Humalog sliding scale continued with BG monitoring     4. Hyperlipidemia - stopped taking d/t financial issues - resumed previous home dose Pravastatin     5. COPD - no acute exacerbation - remains on room air - no adventitious breath sounds noted - on Brovana and Atrovent      6. GERD - on PPI      7. Incidental finding - per CTA scan  focal 3.7 cm right adrenal gland lesion. Further surgical consultation recommended. Further evaluation of 3 cm exophytic lesion in right kidney may be obtained with renal ultrasound - plasma free metanephrines, urine metanephrines evaluating for  pheochromocytoma obtained and pending - general surgery following - MRI of abdomencompleted and showed a 4.2 cm right adrenal nodule is compatible with a lipid-rich adenoma. Benign right renal cysts.  Hepatomegaly with mild steatosis      8. Depression - in better spirits today - continue Trazodone and Duloxetine      9. Alcohol abuse - last known drink was 2 weeks ago - no signs of withdrawal noted     10. Tobacco use -  regarding smoking cessation      11. Morbid obese - lives a sedentary lifestyle - encouraged more activity      12.  consult for social issues - recently retired and unable to afford his medication            has no healthcare insurance     NOTE: This report was transcribed using voice recognition software. Every effort was made to ensure accuracy; however, inadvertent computerized transcription errors may be present.      Electronically signed by JOSSIE Weathers CNP on 9/3/2021 at 1:21 PM

## 2021-09-03 NOTE — CONSULTS
GENERAL SURGERY  CONSULT NOTE    Patient's Name/Date of Birth: Klaus Santana /1960 (49 y.o.)    Date: September 3, 2021     CC: Adrenal mass    HPI:  Klaus Santana is a 64 y.o. male who presents with chest pain and adrenal mass. Patient has past medical history of hypertension diabetes and COPD. He states that he started having diffuse chest pain rating to his neck starting last night. He denies any shortness of breath diaphoresis nausea vomiting abdominal pain. He also had an episode of syncope approximately 2 days ago. He has a history of multiple episodes of syncopal episodes over the past few months requiring multiple admissions. However his cardiac work-up has been negative to date. He denies any recent weight loss. His last colonoscopy was approximately 6 to 7 years ago unsure by whom. He states it was normal. He smokes approximately half pack per day. Does drink alcohol but the last time he drank was approximately 2 to 3 weeks ago. He states his mother also had a adrenal mass that was removed at the [facility] clinic. He is unsure the type of mass that it was. But states that her issues of dizziness were resolved once the mass was removed. He states that he has known about this adrenal mass for the last 2 years and it was incidentally found after a traumatic work-up. He states his PCP did not do any further work-up at that time.     Past Medical History:   Diagnosis Date    Chronic obstructive pulmonary disease ([address]     Diabetes mellitus ([facility] [address]     Hyperlipidemia     Hypertension        Past Surgical History:   Procedure Laterality Date    APPENDECTOMY      COLONOSCOPY      LUNG SURGERY      from scar tissue    NECK SURGERY N/A 2/5/2020    EXCISION POSTERIOR SOFT TISSUE NEOPLASM NECK (CPT 60871) performed by Lucio Amado MD at [address]       Current Facility-Administered Medications   Medication Dose Route Frequency Provider Last Rate Last Admin    melatonin tablet 3 mg  3 mg Oral Nightly PRN Justin Goncalves MD        technetium sestamibi (CARDIOLITE) injection 30 millicurie  30 millicurie IntraVENous ONCE PRN Jordi Chawla MD        nitroGLYCERIN (NITROSTAT) SL tablet 0.4 mg  0.4 mg SubLINGual Q5 Min PRN Estefani Perez DO   0.4 mg at 09/02/21 1450    allopurinol (ZYLOPRIM) tablet 100 mg  100 mg Oral Daily Zheng Torres APRN - CNP   100 mg at 09/02/21 1728    DULoxetine (CYMBALTA) extended release capsule 20 mg  20 mg Oral Daily JOSSIE Menjivar - CNP        metoprolol succinate (TOPROL XL) extended release tablet 100 mg  100 mg Oral BID JOSSIE Menjivar - CNP   100 mg at 09/02/21 2040    pantoprazole (PROTONIX) tablet 40 mg  40 mg Oral QAM AC Zheng Torres APRN - CNP   40 mg at 09/03/21 0604    pravastatin (PRAVACHOL) tablet 20 mg  20 mg Oral Nightly Zheng Torres APRN - CNP   20 mg at 09/02/21 2040    traZODone (DESYREL) tablet 50 mg  50 mg Oral Nightly Zheng Torres, APRN - CNP   50 mg at 09/02/21 2040    sodium chloride flush 0.9 % injection 5-40 mL  5-40 mL IntraVENous 2 times per day JOSSIE Menjivar - CNP   10 mL at 09/02/21 2041    sodium chloride flush 0.9 % injection 5-40 mL  5-40 mL IntraVENous PRN JOSSIE Menjivar - CNP        0.9 % sodium chloride infusion  25 mL IntraVENous PRN JOSSIE Menjivar - CNP        enoxaparin (LOVENOX) injection 40 mg  40 mg SubCUTAneous Daily JOSSIE Menjivar - CNP   40 mg at 09/02/21 1728    ondansetron (ZOFRAN-ODT) disintegrating tablet 4 mg  4 mg Oral Q8H PRN JOSSIE Menjivar CNP        Or    ondansetron [facility]) injection 4 mg  4 mg IntraVENous Q6H PRN JOSSIE Menjivar - AURY        polyethylene glycol (GLYCOLAX) packet 17 g  17 g Oral Daily PRN JOSSIE Menjivar - AURY        acetaminophen (TYLENOL) tablet 650 mg  650 mg Oral Q6H PRN JOSSIE Menjivar CNP        Or    acetaminophen (TYLENOL) suppository 650 mg  650 mg Rectal Q6H PRN JOSSIE Menjivar CNP        regadenoson (LEXISCAN) injection 0.4 mg  0.4 mg IntraVENous ONCE PRN JOSSIE Mon - CNP        insulin lispro (HUMALOG) injection vial 0-12 Units  0-12 Units SubCUTAneous TID WC López Palmer APRN - CNP        insulin lispro (HUMALOG) injection vial 0-6 Units  0-6 Units SubCUTAneous Nightly JOSSIE Mon - CNP        glucose (GLUTOSE) 40 % oral gel 15 g  15 g Oral PRN López Palmer APRN - CNP        dextrose 50 % IV solution  12.5 g IntraVENous PRN JOSSIE Mon - CNP        glucagon (rDNA) injection 1 mg  1 mg IntraMUSCular PRN López Palmer, JOSSIE - CNP        dextrose 5 % solution  100 mL/hr IntraVENous PRN López Palmer APRN - AURY        ipratropium (ATROVENT) 0.02 % nebulizer solution 0.5 mg  0.5 mg Nebulization 4x daily López Palmer APRN - CNP   0.5 mg at 09/03/21 7421    And    Arformoterol Tartrate (BROVANA) nebulizer solution 15 mcg  15 mcg Nebulization BID López Palmer APRN - CNP   15 mcg at 09/03/21 8785       No Known Allergies    Family History   Problem Relation Age of Onset    COPD Mother     Heart Disease Father     No Known Problems Sister        Social History     Socioeconomic History    Marital status:      Spouse name: Not on file    Number of children: Not on file    Years of education: Not on file    Highest education level: Not on file   Occupational History    Not on file   Tobacco Use    Smoking status: Current Every Day Smoker     Packs/day: 1.00     Types: Cigarettes    Smokeless tobacco: Never Used   Substance and Sexual Activity    Alcohol use:  Yes     Alcohol/week: 5.0 standard drinks     Types: 5 Shots of liquor per week    Drug use: Never    Sexual activity: Not on file   Other Topics Concern    Not on file   Social History Narrative    Not on file     Social Determinants of Health     Financial Resource Strain:     Difficulty of Paying Living Expenses:    Food Insecurity:     Worried About [address] in the Last Year:     Ran Out of Food in the Last Year:    Transportation Needs:     Lack of Transportation (Medical):     Lack of Transportation (Non-Medical):    Physical Activity:     Days of Exercise per Week:     Minutes of Exercise per Session:    Stress:     Feeling of Stress :    Social Connections:     Frequency of Communication with Friends and Family:     Frequency of Social Gatherings with Friends and Family:     Attends [employer] Services: Active Member of Clubs or Organizations:     Attends Club or Organization Meetings:     Marital Status:    Intimate Partner Violence:     Fear of Current or Ex-Partner:     Emotionally Abused:     Physically Abused:     Sexually Abused:        ROS:   Review of Systems   Constitutional: Negative for chills, fatigue, fever and unexpected weight change. HENT: Negative for nosebleeds, rhinorrhea and sneezing. Eyes: Negative for pain, redness and itching. Respiratory: Negative for cough, chest tightness, shortness of breath and wheezing. Cardiovascular: Positive for chest pain. Negative for leg swelling. Gastrointestinal: Negative for abdominal distention, abdominal pain, blood in stool, constipation, diarrhea, nausea and vomiting. Endocrine: Negative for polydipsia, polyphagia and polyuria. Genitourinary: Negative for difficulty urinating, dysuria and hematuria. Musculoskeletal: Negative for arthralgias, back pain and neck pain. Skin: Negative for pallor, rash and wound. Neurological: Positive for dizziness, syncope and light-headedness. Negative for weakness and headaches. Psychiatric/Behavioral: Negative for agitation, confusion and hallucinations. Physical Exam:  Vitals:    09/03/21 0115   BP:    Pulse: 86   Resp:    Temp:    SpO2:        PSYCH: mood and affect normal, alert and oriented x 3: No apparent distress, comfortable  EYES: Sclera white, pupils equal round and reactive to light  ENMT:  Hearing normal, trachea midline, ears externally intact  LYMPH: no lympadenopathy in neck.  Nolympadenopathy in groins  RESP: Breath sounds were clear and equal with no rales, wheezes, or rhonchi. Respiratory effort was normal with no retractions or use of accessory muscles. CV: Heart soundswere normal with a regular rate and rhythm. No pedal edema  GI/ Abdomen: The abdomen was soft and non distended. There was no tenderness, guarding, rebound, or rigidity. There was no, hepatosplenomegaly, or hernias. MSK: no clubbing/ no cyanosis/ gait normal    LABS:  CBC  Recent Labs     09/02/21  0958   WBC 14.7*   HGB 14.6   HCT 42.5        BMP  Recent Labs     09/02/21  0958      K 5.0   CL 98   CO2 24   BUN 11   CREATININE 0.6*   CALCIUM 9.4     Liver Function  Recent Labs     09/02/21  0958   BILITOT 0.6   AST 57*   ALT 46*   ALKPHOS 155*   PROT 7.3   LABALBU 3.6     No results for input(s): LACTATE in the last 72 hours. No results for input(s): INR, PTT in the last 72 hours. Invalid input(s): PT    RADIOLOGY  CT HEAD WO CONTRAST    Result Date: 9/2/2021  EXAMINATION: CT OF THE HEAD WITHOUT CONTRAST  9/2/2021 11:04 am TECHNIQUE: CT of the head was performed without the administration of intravenous contrast. Dose modulation, iterative reconstruction, and/or weight based adjustment of the mA/kV was utilized to reduce the radiation dose to as low as reasonably achievable. COMPARISON: CT of the head March 22 HISTORY: ORDERING SYSTEM PROVIDED HISTORY: Evaluate intracranial abnormality TECHNOLOGIST PROVIDED HISTORY: Has a \"code stroke\" or \"stroke alert\" been called? ->No Reason for exam:->Evaluate intracranial abnormality Decision Support Exception - unselect if not a suspected or confirmed emergency medical condition->Emergency Medical Condition (MA) FINDINGS: BRAIN/VENTRICLES: There is no acute intracranial hemorrhage, mass effect or midline shift. No abnormal extra-axial fluid collection. The gray-white differentiation is maintained without evidence of an acute infarct.   There is no evidence of hydrocephalus. ORBITS: The visualized portion of the orbits demonstrate no acute abnormality. SINUSES: The left maxillary, left ethmoid, and left frontal sinus are opacified. Fluid identified in the left mastoid air cell. The remaining visualized paranasal sinuses and mastoid air cells demonstrate no acute abnormality. SOFT TISSUES/SKULL:  No acute abnormality of the visualized skull or soft tissues. Opacification of the left maxillary, ethmoid, and frontal sinuses. Finding may be secondary to sinusitis. No intracranial hemorrhage. No acute intracranial findings. CT ABDOMEN PELVIS W IV CONTRAST Additional Contrast? None    Result Date: 9/2/2021  EXAMINATION: CT OF THE ABDOMEN AND PELVIS WITH CONTRAST 9/2/2021 11:04 am TECHNIQUE: CT of the abdomen and pelvis was performed with the administration of intravenous contrast. Multiplanar reformatted images are provided for review. Dose modulation, iterative reconstruction, and/or weight based adjustment of the mA/kV was utilized to reduce the radiation dose to as low as reasonably achievable. COMPARISON: None. HISTORY: ORDERING SYSTEM PROVIDED HISTORY: trauma TECHNOLOGIST PROVIDED HISTORY: Reason for exam:->trauma Additional Contrast?->None Decision Support Exception - unselect if not a suspected or confirmed emergency medical condition->Emergency Medical Condition (MA) FINDINGS: Lung Bases: Bilateral compressive atelectasis. Liver: No hepatic lesions identified. No traumatic lacerations. Bile ducts:  Gallbladder is unremarkable. No evidence of intrahepatic or extrahepatic biliary dilatation. Pancreas: No pancreatic lesions. The pancreatic duct is not dilated. Adrenal glands: 3.7 cm right adrenal gland lesion identified. Left renal gland is normal in appearance. Kidneys: No evidence of hydronephrosis. 3 cm exophytic hyperattenuating lesion identified in the right upper pole. Spleen: No enhancing lesions. No traumatic lacerations.  Bowel: No evidence of bowel obstruction. The appendix is not directly visualized, no secondary signs of acute appendicitis. Peritoneum/Retroperitoneum: No abnormal pelvic lymphadenopathy. Pelvis:   Bladder is incompletely distended Bones/Soft tissues:   No aggressive osseous lesions. Focal 3.7 cm right adrenal gland lesion. Further surgical consultation recommended. Further evaluation of 3 cm exophytic lesion in right kidney may be obtained with renal ultrasound. No traumatic injuries in the abdomen or pelvis. XR CHEST 1 VIEW    Result Date: 9/2/2021  EXAMINATION: ONE XRAY VIEW OF THE CHEST 9/2/2021 11:06 am COMPARISON: 10/12/2018 HISTORY: ORDERING SYSTEM PROVIDED HISTORY: pain TECHNOLOGIST PROVIDED HISTORY: Reason for exam:->pain FINDINGS: Heart size is normal.  There is some scarring left mid hemithorax and in the left costophrenic angle. There are no acute infiltrates or effusions. No acute process     CTA PULMONARY W CONTRAST    Result Date: 9/2/2021  EXAMINATION: CTA OF THE CHEST 9/2/2021 11:16 TECHNIQUE: CTA of the chest was performed after the administration of intravenous contrast.  Multiplanar reformatted images are provided for review. MIP images are provided for review. Dose modulation, iterative reconstruction, and/or weight based adjustment of the mA/kV was utilized to reduce the radiation dose to as low as reasonably achievable.  COMPARISON: Noncontrast Chest CT 03/20/2021 HISTORY: ORDERING SYSTEM PROVIDED HISTORY: trauma: chest pain: rule out PE TECHNOLOGIST PROVIDED HISTORY: Reason for exam:->trauma: chest pain: rule out PE Decision Support Exception - unselect if not a suspected or confirmed emergency medical condition->Emergency Medical Condition (MA) FINDINGS: LUNGS/PLEURA: There is trace to minimal dependent bibasilar subsegmental atelectasis, less likely infiltrate, progressively lessening to both lung apices, and overall somewhat worse on the left, where there is unchanged background pulmonary scarring/irregular pleural thickening. Rare scattered calcified granuloma are most notable at the posterior right lung base. CARDIOVASCULAR: Negative for pulmonary embolism. Heart size and pulmonary vascularity are top-normal in prominence. Trace to mild/moderate, predominantly hard atherosclerotic plaque is scattered throughout the visualized aorta and its major branches, including the coronary arteries, not significantly changed. LYMPH NODES: Negative for lymphadenopathy throughout the visualized volume, by CT-criteria. OTHER MEDIASTINUM: There is trace to minimal scattered gaseous distension of the visualized esophagus. UPPER ABDOMEN: A previously-reported, macrolobulated, hypodense right adrenal mass is incompletely visualized/characterized, but appears grossly unchanged. There is grossly unchanged, at least mild, hepatomegaly and equivalent diffuse hepatic steatosis, also incompletely visualized/characterized. MUSCULOSKELETAL: There is minimal bilateral gynecomastia. Multiple remote left-sided rib fractures are grossly unchanged. There are grossly-unchanged, chronic, multilevel, asymmetric vertebral compression deformities, related vertebral column curvature/alignment abnormalities, and osseous degenerative disease. No other clinically-significant changes are noted. .     1. Negative for pulmonary embolism. 2.  Trace to minimal dependent bibasilar subsegmental atelectasis, less likely infiltrate, progressively lessening to both lung apices, and overall somewhat worse on the left, where there is unchanged background pulmonary scarring/irregular pleural thickening. 3.  A previously-reported, macrolobulated, hypodense right adrenal mass is incompletely visualized/characterized, but appears grossly unchanged. 4.  Grossly unchanged, at least mild, hepatomegaly and equivalent diffuse hepatic steatosis, also incompletely visualized/characterized. 5.  Otherwise, no significant change. Graylon Chris RECOMMENDATIONS: 1.   Right adrenal mass: Per ACR guidelines: Recommend dedicated adrenal CT, without and with contrast, for further characterization. In the absence of a cancer history, consider surgical resection. In the presence of cancer history, consider histologic sampling and/or PET/CT. Veronique Marie Assessment/Plan:  64 y.o. male with chest pain and multiple syncopal events and adrenal mass       Chest pain cardiac work-up per primary/cardiology  We will send off plasma metanephrines, renin, aldosterone   Will obtain an MRI of the abdomen to assess for adenoma versus other disease process  If biologically functional adenoma will have to discuss surgical intervention in the future. Did discuss with patient all the above  He is agreeable and understanding to the plan  Discussed with Dr. Cori Ybarra M.D., Ph.D., PGY-4  9/3/2021  8:31 AM    Surgery Progress Note            Chief complaint:   Chief Complaint   Patient presents with    Chest Pain     MIDSTERNAL CHEST PAIN RADIATES BILATERALLY UP NECK INTO HEAD, +HEADACHE, DENIES SOB, DENIES WEAKNESS, PATIENT STATES HE HAD SYNCOPAL EPISODE 2 DAYS AGO WITH FALL, LEFT FLANK DISCOLORED, ALSO STATES HE APPLIES HEATING PAD TO [email or web address] [address].  DENIES N/V/D/DIZZINESS, GIVEN NITRO AND ASA VIA [facility]      Patient Active Problem List   Diagnosis    Epidermal cyst of neck    Cardiac arrhythmia    Hypomagnesemia    Chest pain    Syncope and collapse    Right adrenal mass (HCC)    Lesion of right native kidney    Essential hypertension    Type 2 diabetes mellitus with hyperglycemia, without long-term current use of insulin (HCC)    Chronic obstructive pulmonary disease (HCC)    Hyperlipidemia       S: as above    O:   Vitals:    09/03/21 1101   BP: 137/67   Pulse: 88   Resp: 18   Temp: 98 °F (36.7 °C)   SpO2: 97%       Intake/Output Summary (Last 24 hours) at 9/3/2021 1201  Last data filed at 9/3/2021 0832  Gross per 24 hour   Intake 0 ml   Output --   Net 0 ml Labs:  Lab Results   Component Value Date    WBC 14.7 09/02/2021    WBC 14.2 05/14/2021    WBC 7.5 03/24/2021    HGB 14.6 09/02/2021    HGB 13.5 05/14/2021    HGB 8.3 03/24/2021    HCT 42.5 09/02/2021    HCT 40.2 05/14/2021    HCT 25.4 03/24/2021     Lab Results   Component Value Date    CREATININE 0.6 (L) 09/02/2021    BUN 11 09/02/2021     09/02/2021    K 5.0 09/02/2021    CL 98 09/02/2021    CO2 24 09/02/2021     No results found for: LIPASE, AMYLASE      Physical exam:   /67   Pulse 88   Temp 98 °F (36.7 °C) (Oral)   Resp 18   Ht 5' 11\" (1.803 m)   Wt 240 lb 1.6 oz (108.9 kg)   SpO2 97%   BMI 33.49 kg/m²   General appearance: NAD  Head: NCAT  Neck: supple, no masses  Lungs: equal chest rise bilateral  Heart: S1S2 present  Abdomen: soft, nontender, nondistended  Skin; no lesions  Gu: no cva tenderness  Extremities: extremities normal, atraumatic, no cyanosis or edema    A:  adrenal mass, syncope, chest pain    P: will workup adrenal mass as above, follow exam    Lester Caceres MD, MD  9/3/2021

## 2021-09-03 NOTE — CARE COORDINATION
Ss note:9/3/2021.10:06 AM No covid testing. Pt presents with chest pain. Consult noted for \"recently retired and unable to afford any meds at home. \"  Attempted to meet with pt, currently oor for stress test. Per chart review pt is medicaid pending, and is eligible to receive medications from our pharmacy, anticipate if not covered would be minimal cost depending on what medication is ordered, please note that our pharmacy is closed on wkends and Monday is the holiday. Sw can provide Good RX card. NO PCP,list given. SW will follow.  AVERY Winston

## 2021-09-03 NOTE — CARE COORDINATION
Ss note: 9/3/2021 1:53 PM no covid testing. Charting reflects pt is medicaid pending. Sw met with pt who relays he resides with his dtr Natty Chin and his 3 grandkids ages 5, 6 and 15 yrs. Sw provided pt with Good Rx card along with [facility] specialty pharmacy prescription assistance information and pt is aware [facility] also offers 4 dollar prescriptions. Pt does not have PCP, list given, he will secure a PCP once he has insurance coverage.  AVERY Velasquez

## 2021-09-03 NOTE — CARE COORDINATION
Arsh Organ PCP list to patient, but he wants to wait till he has insurance before making any appt.  Electronically signed by Delores Chairez on 9/3/2021 at 11:47 AM

## 2021-09-04 LAB
ALBUMIN SERPL-MCNC: 3.7 G/DL (ref 3.5–5.2)
ALP BLD-CCNC: 144 U/L (ref 40–129)
ALT SERPL-CCNC: 42 U/L (ref 0–40)
ANION GAP SERPL CALCULATED.3IONS-SCNC: 7 MMOL/L (ref 7–16)
AST SERPL-CCNC: 59 U/L (ref 0–39)
BILIRUB SERPL-MCNC: 0.3 MG/DL (ref 0–1.2)
BILIRUBIN DIRECT: <0.2 MG/DL (ref 0–0.3)
BILIRUBIN, INDIRECT: ABNORMAL MG/DL (ref 0–1)
BUN BLDV-MCNC: 11 MG/DL (ref 6–23)
CALCIUM SERPL-MCNC: 9.5 MG/DL (ref 8.6–10.2)
CHLORIDE BLD-SCNC: 98 MMOL/L (ref 98–107)
CO2: 28 MMOL/L (ref 22–29)
CREAT SERPL-MCNC: 0.8 MG/DL (ref 0.7–1.2)
FOLATE: 3 NG/ML (ref 4.8–24.2)
GFR AFRICAN AMERICAN: >60
GFR NON-AFRICAN AMERICAN: >60 ML/MIN/1.73
GLUCOSE BLD-MCNC: 123 MG/DL (ref 74–99)
HBA1C MFR BLD: 5.7 % (ref 4–5.6)
HCT VFR BLD CALC: 41.2 % (ref 37–54)
HEMOGLOBIN: 13.6 G/DL (ref 12.5–16.5)
INR BLD: 1.2
MAGNESIUM: 1.7 MG/DL (ref 1.6–2.6)
MCH RBC QN AUTO: 37.2 PG (ref 26–35)
MCHC RBC AUTO-ENTMCNC: 33 % (ref 32–34.5)
MCV RBC AUTO: 112.6 FL (ref 80–99.9)
METER GLUCOSE: 106 MG/DL (ref 74–99)
METER GLUCOSE: 107 MG/DL (ref 74–99)
METER GLUCOSE: 120 MG/DL (ref 74–99)
PDW BLD-RTO: 13.5 FL (ref 11.5–15)
PLATELET # BLD: 232 E9/L (ref 130–450)
PMV BLD AUTO: 11.4 FL (ref 7–12)
POTASSIUM SERPL-SCNC: 4.6 MMOL/L (ref 3.5–5)
PROTHROMBIN TIME: 13.8 SEC (ref 9.3–12.4)
RBC # BLD: 3.66 E12/L (ref 3.8–5.8)
SODIUM BLD-SCNC: 133 MMOL/L (ref 132–146)
TOTAL PROTEIN: 6.9 G/DL (ref 6.4–8.3)
URIC ACID, SERUM: 5.2 MG/DL (ref 3.4–7)
VITAMIN B-12: 616 PG/ML (ref 211–946)
VITAMIN D 25-HYDROXY: 10 NG/ML (ref 30–100)
WBC # BLD: 10.8 E9/L (ref 4.5–11.5)

## 2021-09-04 PROCEDURE — 6370000000 HC RX 637 (ALT 250 FOR IP): Performed by: NURSE PRACTITIONER

## 2021-09-04 PROCEDURE — 80076 HEPATIC FUNCTION PANEL: CPT

## 2021-09-04 PROCEDURE — APPSS30 APP SPLIT SHARED TIME 16-30 MINUTES: Performed by: NURSE PRACTITIONER

## 2021-09-04 PROCEDURE — 96372 THER/PROPH/DIAG INJ SC/IM: CPT

## 2021-09-04 PROCEDURE — 80048 BASIC METABOLIC PNL TOTAL CA: CPT

## 2021-09-04 PROCEDURE — 6360000002 HC RX W HCPCS: Performed by: NURSE PRACTITIONER

## 2021-09-04 PROCEDURE — 83735 ASSAY OF MAGNESIUM: CPT

## 2021-09-04 PROCEDURE — 82306 VITAMIN D 25 HYDROXY: CPT

## 2021-09-04 PROCEDURE — G0378 HOSPITAL OBSERVATION PER HR: HCPCS

## 2021-09-04 PROCEDURE — 94640 AIRWAY INHALATION TREATMENT: CPT

## 2021-09-04 PROCEDURE — 85027 COMPLETE CBC AUTOMATED: CPT

## 2021-09-04 PROCEDURE — 82962 GLUCOSE BLOOD TEST: CPT

## 2021-09-04 PROCEDURE — 84550 ASSAY OF BLOOD/URIC ACID: CPT

## 2021-09-04 PROCEDURE — 2580000003 HC RX 258: Performed by: NURSE PRACTITIONER

## 2021-09-04 PROCEDURE — 83036 HEMOGLOBIN GLYCOSYLATED A1C: CPT

## 2021-09-04 PROCEDURE — 36415 COLL VENOUS BLD VENIPUNCTURE: CPT

## 2021-09-04 PROCEDURE — 85610 PROTHROMBIN TIME: CPT

## 2021-09-04 RX ORDER — MECOBALAMIN 5000 MCG
10 TABLET,DISINTEGRATING ORAL NIGHTLY PRN
Status: DISCONTINUED | OUTPATIENT
Start: 2021-09-04 | End: 2021-09-05 | Stop reason: HOSPADM

## 2021-09-04 RX ORDER — DULOXETIN HYDROCHLORIDE 30 MG/1
30 CAPSULE, DELAYED RELEASE ORAL DAILY
Status: DISCONTINUED | OUTPATIENT
Start: 2021-09-04 | End: 2021-09-05 | Stop reason: HOSPADM

## 2021-09-04 RX ORDER — FOLIC ACID 1 MG/1
2 TABLET ORAL DAILY
Status: DISCONTINUED | OUTPATIENT
Start: 2021-09-04 | End: 2021-09-05 | Stop reason: HOSPADM

## 2021-09-04 RX ORDER — HYDROCODONE BITARTRATE AND ACETAMINOPHEN 7.5; 325 MG/1; MG/1
1 TABLET ORAL ONCE
Status: COMPLETED | OUTPATIENT
Start: 2021-09-04 | End: 2021-09-04

## 2021-09-04 RX ORDER — DULOXETIN HYDROCHLORIDE 20 MG/1
40 CAPSULE, DELAYED RELEASE ORAL DAILY
Status: DISCONTINUED | OUTPATIENT
Start: 2021-09-05 | End: 2021-09-04

## 2021-09-04 RX ORDER — FLUTICASONE PROPIONATE 50 MCG
2 SPRAY, SUSPENSION (ML) NASAL DAILY
Status: DISCONTINUED | OUTPATIENT
Start: 2021-09-04 | End: 2021-09-05 | Stop reason: HOSPADM

## 2021-09-04 RX ADMIN — HYDROCODONE BITARTRATE AND ACETAMINOPHEN 1 TABLET: 7.5; 325 TABLET ORAL at 11:14

## 2021-09-04 RX ADMIN — Medication 10 ML: at 13:57

## 2021-09-04 RX ADMIN — IPRATROPIUM BROMIDE 0.5 MG: 0.5 SOLUTION RESPIRATORY (INHALATION) at 06:47

## 2021-09-04 RX ADMIN — IPRATROPIUM BROMIDE 0.5 MG: 0.5 SOLUTION RESPIRATORY (INHALATION) at 17:55

## 2021-09-04 RX ADMIN — PRAVASTATIN SODIUM 20 MG: 20 TABLET ORAL at 21:41

## 2021-09-04 RX ADMIN — ACETAMINOPHEN 650 MG: 325 TABLET ORAL at 02:44

## 2021-09-04 RX ADMIN — ARFORMOTEROL TARTRATE 15 MCG: 15 SOLUTION RESPIRATORY (INHALATION) at 06:47

## 2021-09-04 RX ADMIN — ALLOPURINOL 100 MG: 100 TABLET ORAL at 13:55

## 2021-09-04 RX ADMIN — METOPROLOL SUCCINATE 100 MG: 100 TABLET, FILM COATED, EXTENDED RELEASE ORAL at 21:41

## 2021-09-04 RX ADMIN — DULOXETINE HYDROCHLORIDE 20 MG: 20 CAPSULE, DELAYED RELEASE ORAL at 13:59

## 2021-09-04 RX ADMIN — ENOXAPARIN SODIUM 40 MG: 40 INJECTION SUBCUTANEOUS at 09:56

## 2021-09-04 RX ADMIN — ARFORMOTEROL TARTRATE 15 MCG: 15 SOLUTION RESPIRATORY (INHALATION) at 17:55

## 2021-09-04 RX ADMIN — IPRATROPIUM BROMIDE 0.5 MG: 0.5 SOLUTION RESPIRATORY (INHALATION) at 13:30

## 2021-09-04 RX ADMIN — DULOXETINE HYDROCHLORIDE 39 MG: 30 CAPSULE, DELAYED RELEASE ORAL at 13:56

## 2021-09-04 RX ADMIN — Medication 10 ML: at 21:43

## 2021-09-04 RX ADMIN — METOPROLOL SUCCINATE 100 MG: 100 TABLET, FILM COATED, EXTENDED RELEASE ORAL at 09:00

## 2021-09-04 RX ADMIN — TRAZODONE HYDROCHLORIDE 50 MG: 50 TABLET ORAL at 21:41

## 2021-09-04 RX ADMIN — Medication 10 MG: at 21:42

## 2021-09-04 RX ADMIN — PANTOPRAZOLE SODIUM 40 MG: 40 TABLET, DELAYED RELEASE ORAL at 05:48

## 2021-09-04 ASSESSMENT — PAIN DESCRIPTION - DESCRIPTORS: DESCRIPTORS: ACHING;DISCOMFORT;HEADACHE

## 2021-09-04 ASSESSMENT — PAIN DESCRIPTION - LOCATION
LOCATION: HEAD
LOCATION: HEAD

## 2021-09-04 ASSESSMENT — PAIN SCALES - GENERAL
PAINLEVEL_OUTOF10: 7
PAINLEVEL_OUTOF10: 4
PAINLEVEL_OUTOF10: 8
PAINLEVEL_OUTOF10: 6

## 2021-09-04 ASSESSMENT — PAIN DESCRIPTION - ORIENTATION
ORIENTATION: RIGHT;LEFT;OUTER
ORIENTATION: RIGHT;LEFT

## 2021-09-04 ASSESSMENT — PAIN DESCRIPTION - ONSET: ONSET: ON-GOING

## 2021-09-04 ASSESSMENT — PAIN DESCRIPTION - FREQUENCY: FREQUENCY: CONTINUOUS

## 2021-09-04 ASSESSMENT — PAIN DESCRIPTION - PROGRESSION: CLINICAL_PROGRESSION: GRADUALLY IMPROVING

## 2021-09-04 ASSESSMENT — PAIN DESCRIPTION - PAIN TYPE
TYPE: ACUTE PAIN
TYPE: ACUTE PAIN

## 2021-09-04 ASSESSMENT — PAIN - FUNCTIONAL ASSESSMENT: PAIN_FUNCTIONAL_ASSESSMENT: ACTIVITIES ARE NOT PREVENTED

## 2021-09-04 NOTE — PROGRESS NOTES
[address] [place], [place] [address]ist   Progress Note    Admitting Date and Time: 9/2/2021  8:18 AM  Admit Dx: Syncope and collapse [R55]  Atypical chest pain [R07.89]  Chest pain, unspecified type [R07.9]  Lesion of adrenal gland ([address] [E27.9]    Subjective:    Patient complains of numbness and burning in his toes. He also reports a headache. He has not been sleeping well. ROS: denies fever, chills, cp, sob, n/v, HA unless stated above.      allopurinol  100 mg Oral Daily    DULoxetine  20 mg Oral Daily    metoprolol succinate  100 mg Oral BID    pantoprazole  40 mg Oral QAM AC    pravastatin  20 mg Oral Nightly    traZODone  50 mg Oral Nightly    sodium chloride flush  5-40 mL IntraVENous 2 times per day    enoxaparin  40 mg SubCUTAneous Daily    insulin lispro  0-12 Units SubCUTAneous TID WC    insulin lispro  0-6 Units SubCUTAneous Nightly    ipratropium  0.5 mg Nebulization 4x daily    And    Arformoterol Tartrate  15 mcg Nebulization BID     melatonin, 10 mg, Nightly PRN  nitroGLYCERIN, 0.4 mg, Q5 Min PRN  sodium chloride flush, 5-40 mL, PRN  sodium chloride, 25 mL, PRN  ondansetron, 4 mg, Q8H PRN   Or  ondansetron, 4 mg, Q6H PRN  polyethylene glycol, 17 g, Daily PRN  acetaminophen, 650 mg, Q6H PRN   Or  acetaminophen, 650 mg, Q6H PRN  glucose, 15 g, PRN  dextrose, 12.5 g, PRN  glucagon (rDNA), 1 mg, PRN  dextrose, 100 mL/hr, PRN         Objective:    /61   Pulse 80   Temp 98 °F (36.7 °C) (Oral)   Resp 18   Ht 5' 11\" (1.803 m)   Wt 240 lb 1.6 oz (108.9 kg)   SpO2 97%   BMI 33.49 kg/m²   General Appearance: alert and oriented to person, place and time and in no acute distress  Skin: warm and dry  Head: normocephalic and atraumatic  Eyes: pupils equal, round, and reactive to light, extraocular eye movements intact, conjunctivae normal  Neck: neck supple and non tender without mass   Pulmonary/Chest: clear to auscultation bilaterally- no wheezes, rales or rhonchi, normal air movement, no respiratory distress  Cardiovascular: normal rate, normal S1 and S2   Abdomen: soft, non-tender, non-distended, normal bowel sounds, no masses or organomegaly  Extremities: no cyanosis, no clubbing and no edema  Neurologic: no cranial nerve deficit and speech normal      Recent Labs     09/02/21 0958 09/03/21  1137    133   K 5.0 4.5   CL 98 99   CO2 24 26   BUN 11 12   CREATININE 0.6* 0.7   GLUCOSE 132* 112*   CALCIUM 9.4 9.3       Recent Labs     09/02/21  0958 09/03/21  1137   ALKPHOS 155* 123   PROT 7.3 7.0   LABALBU 3.6 3.5   BILITOT 0.6 0.6   AST 57* 53*   ALT 46* 40       Recent Labs     09/02/21 0958 09/03/21  1137 09/04/21  1048   WBC 14.7* 11.2 10.8   RBC 3.91 3.76* 3.66*   HGB 14.6 14.0 13.6   HCT 42.5 41.1 41.2   .7* 109.3* 112.6*   MCH 37.3* 37.2* 37.2*   MCHC 34.4 34.1 33.0   RDW 13.3 13.3 13.5    203 232   MPV 10.6 10.4 11.4           Radiology:   NM Cardiac Stress Test Nuclear Imaging   Final Result      1. The myocardial perfusion is normal.   2. No evidence of stress-induced ischemia or prior myocardial   infarction. 3. Normal LV systolic function, with mild abnormalities of wall motion   and thickening as noted above. 4. There is visibly apparent post stress cavity dilation, with a TID   ratio of 1.25, which is of unknown significance in the absence of a   corresponding perfusion defect. 5. Low to intermediate risk myocardial perfusion study. MRI ABDOMEN W WO CONTRAST   Final Result   A 4.2 cm right adrenal nodule is compatible with a lipid-rich adenoma. Benign right renal cysts. Hepatomegaly with mild steatosis. CT HEAD WO CONTRAST   Final Result   Opacification of the left maxillary, ethmoid, and frontal sinuses. Finding   may be secondary to sinusitis. No intracranial hemorrhage. No acute intracranial findings. CTA PULMONARY W CONTRAST   Final Result   1. Negative for pulmonary embolism.       2.  Trace to minimal dependent bibasilar subsegmental atelectasis, less   likely infiltrate, progressively lessening to both lung apices, and overall   somewhat worse on the left, where there is unchanged background pulmonary   scarring/irregular pleural thickening. 3.  A previously-reported, macrolobulated, hypodense right adrenal mass is   incompletely visualized/characterized, but appears grossly unchanged. 4.  Grossly unchanged, at least mild, hepatomegaly and equivalent diffuse   hepatic steatosis, also incompletely visualized/characterized. 5.  Otherwise, no significant change. Luis Angel Ra RECOMMENDATIONS:   1. Right adrenal mass: Per ACR guidelines: Recommend dedicated adrenal CT,   without and with contrast, for further characterization. In the absence of a   cancer history, consider surgical resection. In the presence of cancer   history, consider histologic sampling and/or PET/CT. Luis Angel Ra CT ABDOMEN PELVIS W IV CONTRAST Additional Contrast? None   Final Result   Focal 3.7 cm right adrenal gland lesion. Further surgical consultation   recommended. Further evaluation of 3 cm exophytic lesion in right kidney may be obtained   with renal ultrasound. No traumatic injuries in the abdomen or pelvis. XR CHEST 1 VIEW   Final Result   No acute process             Assessment:  Principal Problem:    Chest pain  Active Problems:    Right adrenal mass [facility])    Essential hypertension    Chronic obstructive pulmonary disease (HCC)    Macrocytosis  Resolved Problems:    * No resolved hospital problems. *      Plan:  1. Atypical chest pain: Stress test on 9/3/21 with no evidence of stress induced ischemia, post stress cavity dilation in the absence of perfusion defect. Troponin levels 16, 15. Last Echo on 3/23/21 with an EF of 60% and indeterminate diastolic dysfunction. 2. Hypertension: Continue metoprolol. 3. Diabetes: Continue Humalog sliding scale, blood sugar checks.   Check Hgb A1C.   4. Adrenal

## 2021-09-05 VITALS
HEART RATE: 81 BPM | BODY MASS INDEX: 33.61 KG/M2 | SYSTOLIC BLOOD PRESSURE: 127 MMHG | WEIGHT: 240.1 LBS | DIASTOLIC BLOOD PRESSURE: 72 MMHG | RESPIRATION RATE: 18 BRPM | HEIGHT: 71 IN | TEMPERATURE: 98.8 F | OXYGEN SATURATION: 96 %

## 2021-09-05 LAB
ALBUMIN SERPL-MCNC: 3.6 G/DL (ref 3.5–5.2)
ALP BLD-CCNC: 136 U/L (ref 40–129)
ALT SERPL-CCNC: 36 U/L (ref 0–40)
ANION GAP SERPL CALCULATED.3IONS-SCNC: 7 MMOL/L (ref 7–16)
AST SERPL-CCNC: 49 U/L (ref 0–39)
BILIRUB SERPL-MCNC: 0.4 MG/DL (ref 0–1.2)
BILIRUBIN DIRECT: <0.2 MG/DL (ref 0–0.3)
BILIRUBIN, INDIRECT: ABNORMAL MG/DL (ref 0–1)
BUN BLDV-MCNC: 10 MG/DL (ref 6–23)
CALCIUM SERPL-MCNC: 9.5 MG/DL (ref 8.6–10.2)
CHLORIDE BLD-SCNC: 98 MMOL/L (ref 98–107)
CO2: 28 MMOL/L (ref 22–29)
CREAT SERPL-MCNC: 0.7 MG/DL (ref 0.7–1.2)
GFR AFRICAN AMERICAN: >60
GFR NON-AFRICAN AMERICAN: >60 ML/MIN/1.73
GLUCOSE BLD-MCNC: 121 MG/DL (ref 74–99)
HCT VFR BLD CALC: 41.1 % (ref 37–54)
HEMOGLOBIN: 14.2 G/DL (ref 12.5–16.5)
MAGNESIUM: 1.8 MG/DL (ref 1.6–2.6)
MCH RBC QN AUTO: 37.2 PG (ref 26–35)
MCHC RBC AUTO-ENTMCNC: 34.5 % (ref 32–34.5)
MCV RBC AUTO: 107.6 FL (ref 80–99.9)
METER GLUCOSE: 119 MG/DL (ref 74–99)
METER GLUCOSE: 120 MG/DL (ref 74–99)
METER GLUCOSE: 128 MG/DL (ref 74–99)
PDW BLD-RTO: 13.2 FL (ref 11.5–15)
PHOSPHORUS: 5.4 MG/DL (ref 2.5–4.5)
PLATELET # BLD: 253 E9/L (ref 130–450)
PMV BLD AUTO: 10.7 FL (ref 7–12)
POTASSIUM SERPL-SCNC: 4.3 MMOL/L (ref 3.5–5)
RBC # BLD: 3.82 E12/L (ref 3.8–5.8)
SODIUM BLD-SCNC: 133 MMOL/L (ref 132–146)
TOTAL PROTEIN: 7 G/DL (ref 6.4–8.3)
WBC # BLD: 10.7 E9/L (ref 4.5–11.5)

## 2021-09-05 PROCEDURE — 2580000003 HC RX 258: Performed by: NURSE PRACTITIONER

## 2021-09-05 PROCEDURE — 94640 AIRWAY INHALATION TREATMENT: CPT

## 2021-09-05 PROCEDURE — 96372 THER/PROPH/DIAG INJ SC/IM: CPT

## 2021-09-05 PROCEDURE — 82962 GLUCOSE BLOOD TEST: CPT

## 2021-09-05 PROCEDURE — 80048 BASIC METABOLIC PNL TOTAL CA: CPT

## 2021-09-05 PROCEDURE — 6370000000 HC RX 637 (ALT 250 FOR IP): Performed by: INTERNAL MEDICINE

## 2021-09-05 PROCEDURE — 6370000000 HC RX 637 (ALT 250 FOR IP): Performed by: NURSE PRACTITIONER

## 2021-09-05 PROCEDURE — 80076 HEPATIC FUNCTION PANEL: CPT

## 2021-09-05 PROCEDURE — 99217 PR OBSERVATION CARE DISCHARGE MANAGEMENT: CPT | Performed by: INTERNAL MEDICINE

## 2021-09-05 PROCEDURE — G0378 HOSPITAL OBSERVATION PER HR: HCPCS

## 2021-09-05 PROCEDURE — 96375 TX/PRO/DX INJ NEW DRUG ADDON: CPT

## 2021-09-05 PROCEDURE — 85027 COMPLETE CBC AUTOMATED: CPT

## 2021-09-05 PROCEDURE — 6360000002 HC RX W HCPCS: Performed by: NURSE PRACTITIONER

## 2021-09-05 PROCEDURE — 36415 COLL VENOUS BLD VENIPUNCTURE: CPT

## 2021-09-05 PROCEDURE — 84100 ASSAY OF PHOSPHORUS: CPT

## 2021-09-05 PROCEDURE — APPSS45 APP SPLIT SHARED TIME 31-45 MINUTES: Performed by: NURSE PRACTITIONER

## 2021-09-05 PROCEDURE — 83735 ASSAY OF MAGNESIUM: CPT

## 2021-09-05 RX ORDER — DULOXETIN HYDROCHLORIDE 30 MG/1
30 CAPSULE, DELAYED RELEASE ORAL DAILY
Qty: 30 CAPSULE | Refills: 0 | Status: SHIPPED | OUTPATIENT
Start: 2021-09-06 | End: 2022-07-10 | Stop reason: ALTCHOICE

## 2021-09-05 RX ORDER — FLUTICASONE PROPIONATE 50 MCG
2 SPRAY, SUSPENSION (ML) NASAL DAILY
Qty: 16 G | Refills: 0 | Status: SHIPPED | OUTPATIENT
Start: 2021-09-06 | End: 2022-07-10 | Stop reason: ALTCHOICE

## 2021-09-05 RX ORDER — NICOTINE 21 MG/24HR
1 PATCH, TRANSDERMAL 24 HOURS TRANSDERMAL DAILY
Status: DISCONTINUED | OUTPATIENT
Start: 2021-09-05 | End: 2021-09-05 | Stop reason: HOSPADM

## 2021-09-05 RX ORDER — FOLIC ACID 1 MG/1
2 TABLET ORAL DAILY
Qty: 30 TABLET | Refills: 0 | Status: SHIPPED | OUTPATIENT
Start: 2021-09-06 | End: 2022-07-10 | Stop reason: ALTCHOICE

## 2021-09-05 RX ORDER — NICOTINE 21 MG/24HR
1 PATCH, TRANSDERMAL 24 HOURS TRANSDERMAL DAILY
Qty: 30 PATCH | Refills: 0 | Status: SHIPPED | OUTPATIENT
Start: 2021-09-06 | End: 2022-07-10 | Stop reason: ALTCHOICE

## 2021-09-05 RX ADMIN — Medication 10 ML: at 09:59

## 2021-09-05 RX ADMIN — FLUTICASONE PROPIONATE 2 SPRAY: 50 SPRAY, METERED NASAL at 09:57

## 2021-09-05 RX ADMIN — FOLIC ACID 2 MG: 1 TABLET ORAL at 09:58

## 2021-09-05 RX ADMIN — ALLOPURINOL 100 MG: 100 TABLET ORAL at 09:58

## 2021-09-05 RX ADMIN — IPRATROPIUM BROMIDE 0.5 MG: 0.5 SOLUTION RESPIRATORY (INHALATION) at 10:41

## 2021-09-05 RX ADMIN — DULOXETINE HYDROCHLORIDE 30 MG: 30 CAPSULE, DELAYED RELEASE ORAL at 09:58

## 2021-09-05 RX ADMIN — METOPROLOL SUCCINATE 100 MG: 100 TABLET, FILM COATED, EXTENDED RELEASE ORAL at 09:58

## 2021-09-05 RX ADMIN — ENOXAPARIN SODIUM 40 MG: 40 INJECTION SUBCUTANEOUS at 09:57

## 2021-09-05 RX ADMIN — IPRATROPIUM BROMIDE 0.5 MG: 0.5 SOLUTION RESPIRATORY (INHALATION) at 06:51

## 2021-09-05 RX ADMIN — IPRATROPIUM BROMIDE 0.5 MG: 0.5 SOLUTION RESPIRATORY (INHALATION) at 14:38

## 2021-09-05 RX ADMIN — PANTOPRAZOLE SODIUM 40 MG: 40 TABLET, DELAYED RELEASE ORAL at 06:30

## 2021-09-05 RX ADMIN — ARFORMOTEROL TARTRATE 15 MCG: 15 SOLUTION RESPIRATORY (INHALATION) at 06:51

## 2021-09-05 ASSESSMENT — PAIN SCALES - GENERAL
PAINLEVEL_OUTOF10: 0
PAINLEVEL_OUTOF10: 0

## 2021-09-05 NOTE — PLAN OF CARE
Problem: Pain:  Goal: Pain level will decrease  Description: Pain level will decrease  9/5/2021 0444 by Peggy Bacon RN  Outcome: Met This Shift  9/4/2021 2050 by Shraddha Castro RN  Outcome: Met This Shift  Goal: Control of acute pain  Description: Control of acute pain  Outcome: Met This Shift  Goal: Control of chronic pain  Description: Control of chronic pain  Outcome: Met This Shift     Problem: Falls - Risk of:  Goal: Will remain free from falls  Description: Will remain free from falls  Outcome: Met This Shift  Goal: Absence of physical injury  Description: Absence of physical injury  Outcome: Met This Shift

## 2021-09-05 NOTE — DISCHARGE SUMMARY
[facility] Physician Discharge Summary       Jocy Baker, [address]  [address] [facility]  Johnathon Hollyon [phone]    Schedule an appointment as soon as possible for a visit in 2 weeks  Call to schedule a post hospital       Activity level: As Tolerated     Diet: ADULT DIET; Regular; 4 carb choices (60 gm/meal)    Dispo:Home     Condition at discharge: Stable           Patient ID:  Britany Morales  [id number]  64 y.o.  1960    Admit date: 9/2/2021    Discharge date and time:  9/5/2021  2:42 PM    Admission Diagnoses: Principal Problem:    Chest pain  Active Problems:    Right adrenal mass [facility])    Essential hypertension    Chronic obstructive pulmonary disease (HCC)    Macrocytosis  Resolved Problems:    * No resolved hospital problems. *      Discharge Diagnoses: Principal Problem:    Chest pain  Active Problems:    Right adrenal mass [facility])    Essential hypertension    Chronic obstructive pulmonary disease (HCC)    Macrocytosis  Resolved Problems:    * No resolved hospital problems. *      Consults:  IP CONSULT TO SOCIAL WORK  IP CONSULT TO GENERAL SURGERY    Procedures: Nuclear Stress Test    Hospital Course: Patient was admitted with Syncope and collapse [R55]  Atypical chest pain [R07.89]  Chest pain, unspecified type [R07.9]  Lesion of adrenal gland ([address] [E27.9]. Patient is a 70-year-old male who presented to the ED with complained of chest pain. During patient's brief hospital stay patient patient had a nuclear medication stress test. Patient stress test was negative and he has remained chest pain free. Incidentally while having a CTA scan a adrenal mass and benign renal cyst was discovered. A 3 cm exophytic lesion in right kidney may be observed with renal ultrasound, plasma free metanephrines, urine metanephrines evaluating for pheochromocytoma. General surgery was consulted for adrenal mass.  Patient can continue the evaluation process with general surgery (Dr Petey Olvera). Patient has remained stable therefore patient will be discharged home with the following medications and instructions. Discharge Exam:  Vitals:    09/04/21 1756 09/04/21 1915 09/05/21 0342 09/05/21 0838   BP:  (!) 142/69  127/72   Pulse:  74 79 81   Resp:  18  18   Temp:  98 °F (36.7 °C)  98.8 °F (37.1 °C)   TempSrc:  Infrared  Infrared   SpO2: 95% 95%  96%   Weight:       Height:           General Appearance: alert and oriented to person, place and time, well-developed and well-nourished, in no acute distress, obese  Skin: warm and dry, no rash or erythema  Head: normocephalic and atraumatic  Eyes: pupils equal, round, and reactive to light and conjunctivae normal  ENT: hearing grossly normal bilaterally  Neck: neck supple and non tender without mass   Pulmonary/Chest: clear to auscultation bilaterally- no wheezes, rales or rhonchi, normal air movement, no respiratory distress  Cardiovascular: normal rate, regular rhythm and intact distal pulses  Abdomen: soft, non-tender, non-distended, normal bowel sounds, no masses or organomegaly  Extremities: no cyanosis, no clubbing and no edema  Musculoskeletal: normal range of motion, no joint swelling, deformity or tenderness  Neurologic: no cranial nerve deficit, gait and coordination normal and speech normal    I/O last 3 completed shifts: In: 1080 [P.O.:1080]  Out: -   No intake/output data recorded.       LABS:  Recent Labs     09/03/21 1137 09/04/21  1048 09/05/21  0644    133 133   K 4.5 4.6 4.3   CL 99 98 98   CO2 26 28 28   BUN 12 11 10   CREATININE 0.7 0.8 0.7   GLUCOSE 112* 123* 121*   CALCIUM 9.3 9.5 9.5       Recent Labs     09/03/21  1137 09/04/21  1048 09/05/21  0644   WBC 11.2 10.8 10.7   RBC 3.76* 3.66* 3.82   HGB 14.0 13.6 14.2   HCT 41.1 41.2 41.1   .3* 112.6* 107.6*   MCH 37.2* 37.2* 37.2*   MCHC 34.1 33.0 34.5   RDW 13.3 13.5 13.2    232 253   MPV 10.4 11.4 10.7       No results for input(s): histologic sampling and/or PET/CT. Sandy Schwartz CT ABDOMEN PELVIS W IV CONTRAST Additional Contrast? None   Final Result   Focal 3.7 cm right adrenal gland lesion. Further surgical consultation   recommended. Further evaluation of 3 cm exophytic lesion in right kidney may be obtained   with renal ultrasound. No traumatic injuries in the abdomen or pelvis.          XR CHEST 1 VIEW   Final Result   No acute process             Patient Instructions:      Medication List      START taking these medications    fluticasone 50 MCG/ACT nasal spray  Commonly known as: FLONASE  2 sprays by Nasal route daily  Start taking on: September 6, 5899     folic acid 1 MG tablet  Commonly known as: FOLVITE  Take 2 tablets by mouth daily  Start taking on: September 6, 2021        CHANGE how you take these medications    DULoxetine 30 MG extended release capsule  Commonly known as: CYMBALTA  Take 1 capsule by mouth daily Cymbalta was increased from 20 mg daily to Cymbalta to 30 mg daily  Start taking on: September 6, 2021  What changed:   · medication strength  · how much to take  · additional instructions        CONTINUE taking these medications    allopurinol 300 MG tablet  Commonly known as: ZYLOPRIM     Anoro Ellipta 62.5-25 MCG/INH Aepb inhaler  Generic drug: umeclidinium-vilanterol     Januvia 50 MG tablet  Generic drug: SITagliptin     melatonin 5 MG Tbdp disintegrating tablet  Take 2 tablets by mouth nightly     metFORMIN 500 MG tablet  Commonly known as: GLUCOPHAGE     metoprolol succinate 100 MG extended release tablet  Commonly known as: TOPROL XL  Take 1 tablet by mouth 2 times daily     nicotine 21 MG/24HR  Commonly known as: NICODERM CQ  Place 1 patch onto the skin daily  Start taking on: September 6, 2021     omeprazole 40 MG delayed release capsule  Commonly known as: PRILOSEC     pravastatin 20 MG tablet  Commonly known as: PRAVACHOL     traZODone 50 MG tablet  Commonly known as: DESYREL  Take 1 tablet by mouth nightly           Where to Get Your Medications      These medications were sent to [facility], [address] [facility] - F [phone]  [address] [place] [id number]    Phone: [phone]   · DULoxetine 30 MG extended release capsule  · fluticasone 50 MCG/ACT nasal spray  · folic acid 1 MG tablet  · nicotine 21 MG/24HR           Note that more than 30 minutes was spent in preparing discharge papers, discussing discharge with patient, medication review, etc.    Signed:  Electronically signed by JOSSIE Valiente CNP on 9/5/2021 at 2:42 PM    NOTE: This report was transcribed using voice recognition software. Every effort was made to ensure accuracy; however, inadvertent computerized transcription errors may be present.

## 2021-09-05 NOTE — DISCHARGE SUMMARY
[facility] Physician Discharge Summary       Jocy Baker, [address]  [address] [facility]  Johnathon Hollyon [phone]    Schedule an appointment as soon as possible for a visit in 2 weeks  Call to schedule a post hospital       Activity level: As Tolerated     Diet: ADULT DIET; Regular; 4 carb choices (60 gm/meal)    Dispo:Home     Condition at discharge: Stable           Patient ID:  Britany Morales  [id number]  64 y.o.  1960    Admit date: 9/2/2021    Discharge date and time:  9/5/2021  2:42 PM    Admission Diagnoses: Principal Problem:    Chest pain  Active Problems:    Right adrenal mass [facility])    Essential hypertension    Chronic obstructive pulmonary disease (HCC)    Macrocytosis  Resolved Problems:    * No resolved hospital problems. *      Discharge Diagnoses: Principal Problem:    Chest pain  Active Problems:    Right adrenal mass [facility])    Essential hypertension    Chronic obstructive pulmonary disease (HCC)    Macrocytosis  Resolved Problems:    * No resolved hospital problems. *      Consults:  IP CONSULT TO SOCIAL WORK  IP CONSULT TO GENERAL SURGERY    Procedures: Nuclear Stress Test    Hospital Course: Patient was admitted with Syncope and collapse [R55]  Atypical chest pain [R07.89]  Chest pain, unspecified type [R07.9]  Lesion of adrenal gland ([address] [E27.9]. Patient is a 69-year-old male who presented to the ED with complained of chest pain. During patient's brief hospital stay patient patient had a nuclear medication stress test. Patient stress test was negative and he has remained chest pain free. Incidentally while having a CTA scan a adrenal mass and benign renal cyst was discovered. A 3 cm exophytic lesion in right kidney may be observed with renal ultrasound, plasma free metanephrines, urine metanephrines evaluating for pheochromocytoma. General surgery was consulted for adrenal mass.  Patient can continue the evaluation process with general surgery (Dr Marta Fish). Patient has remained stable therefore patient will be discharged home with the following medications and instructions. Discharge Exam:  Vitals:    09/04/21 1756 09/04/21 1915 09/05/21 0342 09/05/21 0838   BP:  (!) 142/69  127/72   Pulse:  74 79 81   Resp:  18  18   Temp:  98 °F (36.7 °C)  98.8 °F (37.1 °C)   TempSrc:  Infrared  Infrared   SpO2: 95% 95%  96%   Weight:       Height:           General Appearance: alert and oriented to person, place and time, well-developed and well-nourished, in no acute distress, obese  Skin: warm and dry, no rash or erythema  Head: normocephalic and atraumatic  Eyes: pupils equal, round, and reactive to light and conjunctivae normal  ENT: hearing grossly normal bilaterally  Neck: neck supple and non tender without mass   Pulmonary/Chest: clear to auscultation bilaterally- no wheezes, rales or rhonchi, normal air movement, no respiratory distress  Cardiovascular: normal rate, regular rhythm and intact distal pulses  Abdomen: soft, non-tender, non-distended, normal bowel sounds, no masses or organomegaly  Extremities: no cyanosis, no clubbing and no edema  Musculoskeletal: normal range of motion, no joint swelling, deformity or tenderness  Neurologic: no cranial nerve deficit, gait and coordination normal and speech normal    I/O last 3 completed shifts: In: 1080 [P.O.:1080]  Out: -   No intake/output data recorded.       LABS:  Recent Labs     09/03/21 1137 09/04/21  1048 09/05/21  0644    133 133   K 4.5 4.6 4.3   CL 99 98 98   CO2 26 28 28   BUN 12 11 10   CREATININE 0.7 0.8 0.7   GLUCOSE 112* 123* 121*   CALCIUM 9.3 9.5 9.5       Recent Labs     09/03/21  1137 09/04/21  1048 09/05/21  0644   WBC 11.2 10.8 10.7   RBC 3.76* 3.66* 3.82   HGB 14.0 13.6 14.2   HCT 41.1 41.2 41.1   .3* 112.6* 107.6*   MCH 37.2* 37.2* 37.2*   MCHC 34.1 33.0 34.5   RDW 13.3 13.5 13.2    232 253   MPV 10.4 11.4 10.7       No results for input(s): POCGLU in the last 72 hours. Imaging:   NM Cardiac Stress Test Nuclear Imaging   Final Result      1. The myocardial perfusion is normal.   2. No evidence of stress-induced ischemia or prior myocardial   infarction. 3. Normal LV systolic function, with mild abnormalities of wall motion   and thickening as noted above. 4. There is visibly apparent post stress cavity dilation, with a TID   ratio of 1.25, which is of unknown significance in the absence of a   corresponding perfusion defect. 5. Low to intermediate risk myocardial perfusion study. MRI ABDOMEN W WO CONTRAST   Final Result   A 4.2 cm right adrenal nodule is compatible with a lipid-rich adenoma. Benign right renal cysts. Hepatomegaly with mild steatosis. CT HEAD WO CONTRAST   Final Result   Opacification of the left maxillary, ethmoid, and frontal sinuses. Finding   may be secondary to sinusitis. No intracranial hemorrhage. No acute intracranial findings. CTA PULMONARY W CONTRAST   Final Result   1. Negative for pulmonary embolism. 2.  Trace to minimal dependent bibasilar subsegmental atelectasis, less   likely infiltrate, progressively lessening to both lung apices, and overall   somewhat worse on the left, where there is unchanged background pulmonary   scarring/irregular pleural thickening. 3.  A previously-reported, macrolobulated, hypodense right adrenal mass is   incompletely visualized/characterized, but appears grossly unchanged. 4.  Grossly unchanged, at least mild, hepatomegaly and equivalent diffuse   hepatic steatosis, also incompletely visualized/characterized. 5.  Otherwise, no significant change. Doni Galdamez RECOMMENDATIONS:   1. Right adrenal mass: Per ACR guidelines: Recommend dedicated adrenal CT,   without and with contrast, for further characterization. In the absence of a   cancer history, consider surgical resection.   In the presence of cancer   history, consider histologic sampling and/or PET/CT. Graylon Chris CT ABDOMEN PELVIS W IV CONTRAST Additional Contrast? None   Final Result   Focal 3.7 cm right adrenal gland lesion. Further surgical consultation   recommended. Further evaluation of 3 cm exophytic lesion in right kidney may be obtained   with renal ultrasound. No traumatic injuries in the abdomen or pelvis.          XR CHEST 1 VIEW   Final Result   No acute process             Patient Instructions:      Medication List      START taking these medications    fluticasone 50 MCG/ACT nasal spray  Commonly known as: FLONASE  2 sprays by Nasal route daily  Start taking on: September 6, 5318     folic acid 1 MG tablet  Commonly known as: FOLVITE  Take 2 tablets by mouth daily  Start taking on: September 6, 2021        CHANGE how you take these medications    DULoxetine 30 MG extended release capsule  Commonly known as: CYMBALTA  Take 1 capsule by mouth daily Cymbalta was increased from 20 mg daily to Cymbalta to 30 mg daily  Start taking on: September 6, 2021  What changed:   · medication strength  · how much to take  · additional instructions        CONTINUE taking these medications    allopurinol 300 MG tablet  Commonly known as: ZYLOPRIM     Anoro Ellipta 62.5-25 MCG/INH Aepb inhaler  Generic drug: umeclidinium-vilanterol     Januvia 50 MG tablet  Generic drug: SITagliptin     melatonin 5 MG Tbdp disintegrating tablet  Take 2 tablets by mouth nightly     metFORMIN 500 MG tablet  Commonly known as: GLUCOPHAGE     metoprolol succinate 100 MG extended release tablet  Commonly known as: TOPROL XL  Take 1 tablet by mouth 2 times daily     nicotine 21 MG/24HR  Commonly known as: NICODERM CQ  Place 1 patch onto the skin daily  Start taking on: September 6, 2021     omeprazole 40 MG delayed release capsule  Commonly known as: PRILOSEC     pravastatin 20 MG tablet  Commonly known as: PRAVACHOL     traZODone 50 MG tablet  Commonly known as: DESYREL  Take 1 tablet by mouth nightly           Where to Get Your Medications      These medications were sent to [address], [address] Jazmin Oliver 32 - F [phone]  [address] [facility] [address] [place] [address]    Phone: [phone]   · DULoxetine 30 MG extended release capsule  · fluticasone 50 MCG/ACT nasal spray  · folic acid 1 MG tablet  · nicotine 21 MG/24HR           Note that more than 30 minutes was spent in preparing discharge papers, discussing discharge with patient, medication review, etc.    Signed:  Electronically signed by JOSSIE Padilla CNP on 9/5/2021 at 2:42 PM    NOTE: This report was transcribed using voice recognition software. Every effort was made to ensure accuracy; however, inadvertent computerized transcription errors may be present.

## 2021-09-05 NOTE — PLAN OF CARE
Problem: Pain:  Goal: Pain level will decrease  Description: Pain level will decrease  9/5/2021 0905 by Julianna Saldana RN  Outcome: Met This Shift     Problem: Pain:  Goal: Control of acute pain  Description: Control of acute pain  9/5/2021 0905 by Julianna Saldana RN  Outcome: Met This Shift     Problem: Pain:  Goal: Control of chronic pain  Description: Control of chronic pain  9/5/2021 0905 by Julianna Saldana RN  Outcome: Met This Shift     Problem: Falls - Risk of:  Goal: Will remain free from falls  Description: Will remain free from falls  9/5/2021 0905 by Julianna Saldana RN  Outcome: Met This Shift     Problem: Falls - Risk of:  Goal: Absence of physical injury  Description: Absence of physical injury  9/5/2021 0905 by Julianna Saldana RN  Outcome: Met This Shift

## 2021-09-06 LAB
CREATININE 24 HOUR URINE: NORMAL MG/D (ref 800–2100)
CREATININE URINE: 84 MG/DL
HOURS COLLECTED: NORMAL
METANEPH/PLASMA INTERP: NORMAL
METANEPHRINE FREE PLASMA: <0.1 NMOL/L (ref 0–0.49)
METANEPHRINE INTREP URINE: NORMAL
METANEPHRINE UG/G CRE: 67 UG/G CRT (ref 0–300)
METANEPHRINE, UR-PER VOL: 56 UG/L
METANEPHRINES URINE: NORMAL UG/D (ref 55–320)
NORMETANEPHRINE 24 HOUR URINE: NORMAL UG/D (ref 114–865)
NORMETANEPHRINE FREE PLASMA: 0.37 NMOL/L (ref 0–0.89)
NORMETANEPHRINE, (G/CRT): 258 UG/G CRT (ref 0–400)
NORMETANEPHRINES, NMOL/L: 217 UG/L
URINE TOTAL VOLUME: NORMAL

## 2021-09-07 LAB — ALDOSTERONE: 10.3 NG/DL

## 2021-09-08 LAB — RENIN ACTIVITY: <0.1 NG/ML/HR

## 2022-07-10 ENCOUNTER — APPOINTMENT (OUTPATIENT)
Dept: CT IMAGING | Age: 62
DRG: 140 | End: 2022-07-10
Payer: COMMERCIAL

## 2022-07-10 ENCOUNTER — HOSPITAL ENCOUNTER (INPATIENT)
Age: 62
LOS: 3 days | Discharge: ANOTHER HOSPITAL | DRG: 140 | End: 2022-07-13
Attending: EMERGENCY MEDICINE | Admitting: INTERNAL MEDICINE
Payer: COMMERCIAL

## 2022-07-10 DIAGNOSIS — F41.9 ANXIETY: ICD-10-CM

## 2022-07-10 DIAGNOSIS — R07.9 CHEST PAIN, UNSPECIFIED TYPE: Primary | ICD-10-CM

## 2022-07-10 DIAGNOSIS — R94.31 ACUTE ELECTROCARDIOGRAM CHANGES: ICD-10-CM

## 2022-07-10 PROBLEM — J44.1 COPD EXACERBATION (HCC): Status: ACTIVE | Noted: 2022-07-10

## 2022-07-10 LAB
ADENOVIRUS BY PCR: NOT DETECTED
ANION GAP SERPL CALCULATED.3IONS-SCNC: 13 MMOL/L (ref 7–16)
BASOPHILS ABSOLUTE: 0.05 E9/L (ref 0–0.2)
BASOPHILS RELATIVE PERCENT: 0.6 % (ref 0–2)
BORDETELLA PARAPERTUSSIS BY PCR: NOT DETECTED
BORDETELLA PERTUSSIS BY PCR: NOT DETECTED
BUN BLDV-MCNC: 20 MG/DL (ref 6–23)
CALCIUM SERPL-MCNC: 9.3 MG/DL (ref 8.6–10.2)
CHLAMYDOPHILIA PNEUMONIAE BY PCR: NOT DETECTED
CHLORIDE BLD-SCNC: 96 MMOL/L (ref 98–107)
CO2: 23 MMOL/L (ref 22–29)
CORONAVIRUS 229E BY PCR: NOT DETECTED
CORONAVIRUS HKU1 BY PCR: NOT DETECTED
CORONAVIRUS NL63 BY PCR: NOT DETECTED
CORONAVIRUS OC43 BY PCR: NOT DETECTED
CREAT SERPL-MCNC: 1.1 MG/DL (ref 0.7–1.2)
EOSINOPHILS ABSOLUTE: 0.24 E9/L (ref 0.05–0.5)
EOSINOPHILS RELATIVE PERCENT: 2.6 % (ref 0–6)
GFR AFRICAN AMERICAN: >60
GFR NON-AFRICAN AMERICAN: >60 ML/MIN/1.73
GLUCOSE BLD-MCNC: 150 MG/DL (ref 74–99)
HBA1C MFR BLD: 6.2 % (ref 4–5.6)
HCT VFR BLD CALC: 46 % (ref 37–54)
HEMOGLOBIN: 16.2 G/DL (ref 12.5–16.5)
HUMAN METAPNEUMOVIRUS BY PCR: NOT DETECTED
HUMAN RHINOVIRUS/ENTEROVIRUS BY PCR: NOT DETECTED
IMMATURE GRANULOCYTES #: 0.04 E9/L
IMMATURE GRANULOCYTES %: 0.4 % (ref 0–5)
INFLUENZA A BY PCR: NOT DETECTED
INFLUENZA B BY PCR: NOT DETECTED
LYMPHOCYTES ABSOLUTE: 3.06 E9/L (ref 1.5–4)
LYMPHOCYTES RELATIVE PERCENT: 33.7 % (ref 20–42)
MCH RBC QN AUTO: 33.9 PG (ref 26–35)
MCHC RBC AUTO-ENTMCNC: 35.2 % (ref 32–34.5)
MCV RBC AUTO: 96.2 FL (ref 80–99.9)
METER GLUCOSE: 135 MG/DL (ref 74–99)
METER GLUCOSE: 196 MG/DL (ref 74–99)
MONOCYTES ABSOLUTE: 0.62 E9/L (ref 0.1–0.95)
MONOCYTES RELATIVE PERCENT: 6.8 % (ref 2–12)
MYCOPLASMA PNEUMONIAE BY PCR: NOT DETECTED
NEUTROPHILS ABSOLUTE: 5.07 E9/L (ref 1.8–7.3)
NEUTROPHILS RELATIVE PERCENT: 55.9 % (ref 43–80)
PARAINFLUENZA VIRUS 1 BY PCR: NOT DETECTED
PARAINFLUENZA VIRUS 2 BY PCR: NOT DETECTED
PARAINFLUENZA VIRUS 3 BY PCR: NOT DETECTED
PARAINFLUENZA VIRUS 4 BY PCR: NOT DETECTED
PDW BLD-RTO: 12.7 FL (ref 11.5–15)
PLATELET # BLD: 153 E9/L (ref 130–450)
PMV BLD AUTO: 10.7 FL (ref 7–12)
POTASSIUM REFLEX MAGNESIUM: 4.3 MMOL/L (ref 3.5–5)
PRO-BNP: 394 PG/ML (ref 0–125)
RBC # BLD: 4.78 E12/L (ref 3.8–5.8)
RESPIRATORY SYNCYTIAL VIRUS BY PCR: NOT DETECTED
SARS-COV-2, NAAT: NOT DETECTED
SARS-COV-2, PCR: NOT DETECTED
SODIUM BLD-SCNC: 132 MMOL/L (ref 132–146)
TROPONIN, HIGH SENSITIVITY: 26 NG/L (ref 0–11)
TROPONIN, HIGH SENSITIVITY: 28 NG/L (ref 0–11)
WBC # BLD: 9.1 E9/L (ref 4.5–11.5)

## 2022-07-10 PROCEDURE — 6360000004 HC RX CONTRAST MEDICATION: Performed by: EMERGENCY MEDICINE

## 2022-07-10 PROCEDURE — 6360000002 HC RX W HCPCS: Performed by: NURSE PRACTITIONER

## 2022-07-10 PROCEDURE — 94640 AIRWAY INHALATION TREATMENT: CPT

## 2022-07-10 PROCEDURE — 6370000000 HC RX 637 (ALT 250 FOR IP): Performed by: NURSE PRACTITIONER

## 2022-07-10 PROCEDURE — 94664 DEMO&/EVAL PT USE INHALER: CPT

## 2022-07-10 PROCEDURE — 2580000003 HC RX 258: Performed by: EMERGENCY MEDICINE

## 2022-07-10 PROCEDURE — 83880 ASSAY OF NATRIURETIC PEPTIDE: CPT

## 2022-07-10 PROCEDURE — 99239 HOSP IP/OBS DSCHRG MGMT >30: CPT | Performed by: INTERNAL MEDICINE

## 2022-07-10 PROCEDURE — 83036 HEMOGLOBIN GLYCOSYLATED A1C: CPT

## 2022-07-10 PROCEDURE — 1200000000 HC SEMI PRIVATE

## 2022-07-10 PROCEDURE — 85025 COMPLETE CBC W/AUTO DIFF WBC: CPT

## 2022-07-10 PROCEDURE — 82962 GLUCOSE BLOOD TEST: CPT

## 2022-07-10 PROCEDURE — 96375 TX/PRO/DX INJ NEW DRUG ADDON: CPT

## 2022-07-10 PROCEDURE — 80048 BASIC METABOLIC PNL TOTAL CA: CPT

## 2022-07-10 PROCEDURE — 96374 THER/PROPH/DIAG INJ IV PUSH: CPT

## 2022-07-10 PROCEDURE — APPSS45 APP SPLIT SHARED TIME 31-45 MINUTES: Performed by: NURSE PRACTITIONER

## 2022-07-10 PROCEDURE — 99285 EMERGENCY DEPT VISIT HI MDM: CPT

## 2022-07-10 PROCEDURE — 36415 COLL VENOUS BLD VENIPUNCTURE: CPT

## 2022-07-10 PROCEDURE — 71275 CT ANGIOGRAPHY CHEST: CPT

## 2022-07-10 PROCEDURE — 0202U NFCT DS 22 TRGT SARS-COV-2: CPT

## 2022-07-10 PROCEDURE — 6360000002 HC RX W HCPCS: Performed by: EMERGENCY MEDICINE

## 2022-07-10 PROCEDURE — 93005 ELECTROCARDIOGRAM TRACING: CPT | Performed by: EMERGENCY MEDICINE

## 2022-07-10 PROCEDURE — 6370000000 HC RX 637 (ALT 250 FOR IP): Performed by: EMERGENCY MEDICINE

## 2022-07-10 PROCEDURE — 87635 SARS-COV-2 COVID-19 AMP PRB: CPT

## 2022-07-10 PROCEDURE — 2580000003 HC RX 258: Performed by: NURSE PRACTITIONER

## 2022-07-10 PROCEDURE — 6370000000 HC RX 637 (ALT 250 FOR IP): Performed by: INTERNAL MEDICINE

## 2022-07-10 PROCEDURE — 84484 ASSAY OF TROPONIN QUANT: CPT

## 2022-07-10 RX ORDER — FENTANYL CITRATE 0.05 MG/ML
100 INJECTION, SOLUTION INTRAMUSCULAR; INTRAVENOUS ONCE
Status: COMPLETED | OUTPATIENT
Start: 2022-07-10 | End: 2022-07-10

## 2022-07-10 RX ORDER — ENOXAPARIN SODIUM 100 MG/ML
40 INJECTION SUBCUTANEOUS DAILY
Status: DISCONTINUED | OUTPATIENT
Start: 2022-07-10 | End: 2022-07-10

## 2022-07-10 RX ORDER — ONDANSETRON 4 MG/1
4 TABLET, ORALLY DISINTEGRATING ORAL EVERY 8 HOURS PRN
Status: DISCONTINUED | OUTPATIENT
Start: 2022-07-10 | End: 2022-07-13 | Stop reason: HOSPADM

## 2022-07-10 RX ORDER — INSULIN LISPRO 100 [IU]/ML
0-3 INJECTION, SOLUTION INTRAVENOUS; SUBCUTANEOUS NIGHTLY
Status: DISCONTINUED | OUTPATIENT
Start: 2022-07-10 | End: 2022-07-13 | Stop reason: HOSPADM

## 2022-07-10 RX ORDER — METOPROLOL SUCCINATE 25 MG/1
100 TABLET, EXTENDED RELEASE ORAL 2 TIMES DAILY
Status: DISCONTINUED | OUTPATIENT
Start: 2022-07-10 | End: 2022-07-11

## 2022-07-10 RX ORDER — MECOBALAMIN 5000 MCG
10 TABLET,DISINTEGRATING ORAL NIGHTLY
Status: DISCONTINUED | OUTPATIENT
Start: 2022-07-10 | End: 2022-07-10

## 2022-07-10 RX ORDER — 0.9 % SODIUM CHLORIDE 0.9 %
1000 INTRAVENOUS SOLUTION INTRAVENOUS ONCE
Status: COMPLETED | OUTPATIENT
Start: 2022-07-10 | End: 2022-07-10

## 2022-07-10 RX ORDER — HYDROCODONE BITARTRATE AND ACETAMINOPHEN 5; 325 MG/1; MG/1
1 TABLET ORAL EVERY 4 HOURS PRN
Status: DISCONTINUED | OUTPATIENT
Start: 2022-07-10 | End: 2022-07-13 | Stop reason: HOSPADM

## 2022-07-10 RX ORDER — PANTOPRAZOLE SODIUM 40 MG/1
40 TABLET, DELAYED RELEASE ORAL
Status: DISCONTINUED | OUTPATIENT
Start: 2022-07-11 | End: 2022-07-13 | Stop reason: HOSPADM

## 2022-07-10 RX ORDER — ACETAMINOPHEN 325 MG/1
650 TABLET ORAL EVERY 6 HOURS PRN
Status: DISCONTINUED | OUTPATIENT
Start: 2022-07-10 | End: 2022-07-13 | Stop reason: HOSPADM

## 2022-07-10 RX ORDER — MORPHINE SULFATE 10 MG/ML
8 INJECTION, SOLUTION INTRAMUSCULAR; INTRAVENOUS ONCE
Status: COMPLETED | OUTPATIENT
Start: 2022-07-10 | End: 2022-07-10

## 2022-07-10 RX ORDER — ONDANSETRON 2 MG/ML
4 INJECTION INTRAMUSCULAR; INTRAVENOUS EVERY 6 HOURS PRN
Status: DISCONTINUED | OUTPATIENT
Start: 2022-07-10 | End: 2022-07-13 | Stop reason: HOSPADM

## 2022-07-10 RX ORDER — METHYLPREDNISOLONE SODIUM SUCCINATE 40 MG/ML
40 INJECTION, POWDER, LYOPHILIZED, FOR SOLUTION INTRAMUSCULAR; INTRAVENOUS EVERY 12 HOURS
Status: DISCONTINUED | OUTPATIENT
Start: 2022-07-10 | End: 2022-07-11

## 2022-07-10 RX ORDER — ALBUTEROL SULFATE 2.5 MG/3ML
2.5 SOLUTION RESPIRATORY (INHALATION) EVERY 4 HOURS PRN
Status: DISCONTINUED | OUTPATIENT
Start: 2022-07-10 | End: 2022-07-13 | Stop reason: HOSPADM

## 2022-07-10 RX ORDER — ARFORMOTEROL TARTRATE 15 UG/2ML
15 SOLUTION RESPIRATORY (INHALATION) 2 TIMES DAILY
Status: DISCONTINUED | OUTPATIENT
Start: 2022-07-10 | End: 2022-07-13 | Stop reason: HOSPADM

## 2022-07-10 RX ORDER — SODIUM CHLORIDE 0.9 % (FLUSH) 0.9 %
5-40 SYRINGE (ML) INJECTION EVERY 12 HOURS SCHEDULED
Status: DISCONTINUED | OUTPATIENT
Start: 2022-07-10 | End: 2022-07-13 | Stop reason: HOSPADM

## 2022-07-10 RX ORDER — BUDESONIDE 0.5 MG/2ML
500 INHALANT ORAL 2 TIMES DAILY
Status: DISCONTINUED | OUTPATIENT
Start: 2022-07-10 | End: 2022-07-13 | Stop reason: HOSPADM

## 2022-07-10 RX ORDER — DEXTROSE MONOHYDRATE 50 MG/ML
100 INJECTION, SOLUTION INTRAVENOUS PRN
Status: DISCONTINUED | OUTPATIENT
Start: 2022-07-10 | End: 2022-07-13 | Stop reason: HOSPADM

## 2022-07-10 RX ORDER — ZOLPIDEM TARTRATE 5 MG/1
5 TABLET ORAL NIGHTLY PRN
Status: DISCONTINUED | OUTPATIENT
Start: 2022-07-10 | End: 2022-07-13 | Stop reason: HOSPADM

## 2022-07-10 RX ORDER — ACETAMINOPHEN 650 MG/1
650 SUPPOSITORY RECTAL EVERY 6 HOURS PRN
Status: DISCONTINUED | OUTPATIENT
Start: 2022-07-10 | End: 2022-07-13 | Stop reason: HOSPADM

## 2022-07-10 RX ORDER — IBUPROFEN 200 MG
600 TABLET ORAL EVERY 6 HOURS PRN
Status: ON HOLD | COMMUNITY
End: 2022-07-13 | Stop reason: HOSPADM

## 2022-07-10 RX ORDER — IPRATROPIUM BROMIDE AND ALBUTEROL SULFATE 2.5; .5 MG/3ML; MG/3ML
1 SOLUTION RESPIRATORY (INHALATION)
Status: DISCONTINUED | OUTPATIENT
Start: 2022-07-10 | End: 2022-07-13 | Stop reason: HOSPADM

## 2022-07-10 RX ORDER — INSULIN LISPRO 100 [IU]/ML
0-6 INJECTION, SOLUTION INTRAVENOUS; SUBCUTANEOUS
Status: DISCONTINUED | OUTPATIENT
Start: 2022-07-10 | End: 2022-07-13 | Stop reason: HOSPADM

## 2022-07-10 RX ORDER — FOLIC ACID 1 MG/1
2 TABLET ORAL DAILY
Status: DISCONTINUED | OUTPATIENT
Start: 2022-07-10 | End: 2022-07-13 | Stop reason: HOSPADM

## 2022-07-10 RX ORDER — POLYETHYLENE GLYCOL 3350 17 G/17G
17 POWDER, FOR SOLUTION ORAL DAILY PRN
Status: DISCONTINUED | OUTPATIENT
Start: 2022-07-10 | End: 2022-07-13 | Stop reason: HOSPADM

## 2022-07-10 RX ORDER — SODIUM CHLORIDE 0.9 % (FLUSH) 0.9 %
5-40 SYRINGE (ML) INJECTION PRN
Status: DISCONTINUED | OUTPATIENT
Start: 2022-07-10 | End: 2022-07-13 | Stop reason: HOSPADM

## 2022-07-10 RX ORDER — SODIUM CHLORIDE 9 MG/ML
INJECTION, SOLUTION INTRAVENOUS PRN
Status: DISCONTINUED | OUTPATIENT
Start: 2022-07-10 | End: 2022-07-13 | Stop reason: HOSPADM

## 2022-07-10 RX ORDER — LORAZEPAM 1 MG/1
1 TABLET ORAL ONCE
Status: COMPLETED | OUTPATIENT
Start: 2022-07-10 | End: 2022-07-10

## 2022-07-10 RX ORDER — HEPARIN SODIUM 5000 [USP'U]/ML
5000 INJECTION, SOLUTION INTRAVENOUS; SUBCUTANEOUS EVERY 8 HOURS SCHEDULED
Status: DISCONTINUED | OUTPATIENT
Start: 2022-07-10 | End: 2022-07-13 | Stop reason: HOSPADM

## 2022-07-10 RX ORDER — IPRATROPIUM BROMIDE AND ALBUTEROL SULFATE 2.5; .5 MG/3ML; MG/3ML
3 SOLUTION RESPIRATORY (INHALATION) ONCE
Status: COMPLETED | OUTPATIENT
Start: 2022-07-10 | End: 2022-07-10

## 2022-07-10 RX ORDER — PRAVASTATIN SODIUM 20 MG
20 TABLET ORAL DAILY
Status: DISCONTINUED | OUTPATIENT
Start: 2022-07-10 | End: 2022-07-12

## 2022-07-10 RX ORDER — NICOTINE 21 MG/24HR
1 PATCH, TRANSDERMAL 24 HOURS TRANSDERMAL DAILY
Status: DISCONTINUED | OUTPATIENT
Start: 2022-07-10 | End: 2022-07-13 | Stop reason: HOSPADM

## 2022-07-10 RX ADMIN — SODIUM CHLORIDE 1000 ML: 9 INJECTION, SOLUTION INTRAVENOUS at 15:27

## 2022-07-10 RX ADMIN — FOLIC ACID 2 MG: 1 TABLET ORAL at 18:11

## 2022-07-10 RX ADMIN — Medication 10 ML: at 23:24

## 2022-07-10 RX ADMIN — MORPHINE SULFATE 8 MG: 10 INJECTION INTRAVENOUS at 16:41

## 2022-07-10 RX ADMIN — ARFORMOTEROL TARTRATE 15 MCG: 15 SOLUTION RESPIRATORY (INHALATION) at 18:37

## 2022-07-10 RX ADMIN — IOPAMIDOL 75 ML: 755 INJECTION, SOLUTION INTRAVENOUS at 16:25

## 2022-07-10 RX ADMIN — INSULIN LISPRO 1 UNITS: 100 INJECTION, SOLUTION INTRAVENOUS; SUBCUTANEOUS at 21:09

## 2022-07-10 RX ADMIN — PRAVASTATIN SODIUM 20 MG: 20 TABLET ORAL at 18:10

## 2022-07-10 RX ADMIN — BUDESONIDE 500 MCG: 0.5 SUSPENSION RESPIRATORY (INHALATION) at 18:37

## 2022-07-10 RX ADMIN — HEPARIN SODIUM 5000 UNITS: 5000 INJECTION INTRAVENOUS; SUBCUTANEOUS at 21:11

## 2022-07-10 RX ADMIN — METHYLPREDNISOLONE SODIUM SUCCINATE 40 MG: 40 INJECTION, POWDER, FOR SOLUTION INTRAMUSCULAR; INTRAVENOUS at 18:11

## 2022-07-10 RX ADMIN — LORAZEPAM 1 MG: 1 TABLET ORAL at 18:11

## 2022-07-10 RX ADMIN — METOPROLOL SUCCINATE 100 MG: 25 TABLET, EXTENDED RELEASE ORAL at 21:13

## 2022-07-10 RX ADMIN — ZOLPIDEM TARTRATE 5 MG: 5 TABLET, COATED ORAL at 21:43

## 2022-07-10 RX ADMIN — FENTANYL CITRATE 100 MCG: 0.05 INJECTION, SOLUTION INTRAMUSCULAR; INTRAVENOUS at 15:27

## 2022-07-10 RX ADMIN — IPRATROPIUM BROMIDE AND ALBUTEROL SULFATE 3 AMPULE: .5; 2.5 SOLUTION RESPIRATORY (INHALATION) at 16:37

## 2022-07-10 ASSESSMENT — PAIN SCALES - GENERAL
PAINLEVEL_OUTOF10: 8
PAINLEVEL_OUTOF10: 7
PAINLEVEL_OUTOF10: 6

## 2022-07-10 ASSESSMENT — PAIN - FUNCTIONAL ASSESSMENT
PAIN_FUNCTIONAL_ASSESSMENT: PREVENTS OR INTERFERES SOME ACTIVE ACTIVITIES AND ADLS
PAIN_FUNCTIONAL_ASSESSMENT: 0-10
PAIN_FUNCTIONAL_ASSESSMENT: PREVENTS OR INTERFERES SOME ACTIVE ACTIVITIES AND ADLS

## 2022-07-10 ASSESSMENT — PAIN DESCRIPTION - ORIENTATION
ORIENTATION: LEFT

## 2022-07-10 ASSESSMENT — PAIN DESCRIPTION - LOCATION
LOCATION: RIB CAGE
LOCATION: RIB CAGE
LOCATION: OTHER (COMMENT)
LOCATION: RIB CAGE

## 2022-07-10 ASSESSMENT — PAIN DESCRIPTION - PAIN TYPE
TYPE: ACUTE PAIN
TYPE: ACUTE PAIN

## 2022-07-10 ASSESSMENT — ENCOUNTER SYMPTOMS
ABDOMINAL PAIN: 0
RHINORRHEA: 0
VOMITING: 0
COLOR CHANGE: 0
NAUSEA: 0
COUGH: 1
BACK PAIN: 0
SHORTNESS OF BREATH: 1

## 2022-07-10 ASSESSMENT — PAIN DESCRIPTION - DESCRIPTORS
DESCRIPTORS: ACHING;DISCOMFORT
DESCRIPTORS: ACHING;DISCOMFORT;DULL

## 2022-07-10 ASSESSMENT — PATIENT HEALTH QUESTIONNAIRE - PHQ9: SUM OF ALL RESPONSES TO PHQ QUESTIONS 1-9: 11

## 2022-07-10 ASSESSMENT — PAIN DESCRIPTION - ONSET: ONSET: ON-GOING

## 2022-07-10 ASSESSMENT — PAIN DESCRIPTION - FREQUENCY: FREQUENCY: INTERMITTENT

## 2022-07-10 NOTE — ED NOTES
Dr. Kayla Obando informed of pt's feelings of depression due to financial issues at home and wishing he could not wake up in the morning due to these troubles. Pt denies SI/HI. No orders received at this time.       [email or web address]  07/10/22 3494

## 2022-07-10 NOTE — ED NOTES
Nurse to nurse given to Rosendo Vela, [place]. Pt to go to 4th floor.       Kajal High  07/10/22 9724

## 2022-07-10 NOTE — ED PROVIDER NOTES
Presents to the ED for evaluation of left sided chest pain. Pain is left lower. Occasionally has in his upper chest which she describes as a sharp stabbing pain describes as a burning knife. This is intermittent. He states he has more persistent the pain in the left lower anterior chest wall which he describes as a clicking sensation. Patient reports previous similar pain when he had rib fractures. Denies any trauma to his chest.  He also reports that he has been very short of breath. History of COPD and is still a current smoker. Had been placed on oxygen therapy but has not been on it for quite some time because he ran out of oxygen bottles and has no insurance therefore he could not afford the oxygen. Symptoms are worse with any type of exertion. Mildly improved with rest.  He was given aspirin and sublingual nitroglycerin in route by EMS which did not help his pain. Review of Systems   Constitutional: Negative for chills, diaphoresis, fatigue and fever. HENT: Negative for congestion and rhinorrhea. Eyes: Negative for visual disturbance. Respiratory: Positive for cough and shortness of breath. Cardiovascular: Positive for chest pain. Negative for palpitations and leg swelling. Gastrointestinal: Negative for abdominal pain, nausea and vomiting. Genitourinary: Negative for decreased urine volume and difficulty urinating. Musculoskeletal: Negative for back pain, gait problem and neck pain. Skin: Negative for color change and pallor. Neurological: Negative for dizziness, syncope and light-headedness. Hematological: Does not bruise/bleed easily. All other systems reviewed and are negative. Physical Exam  Vitals and nursing note reviewed. Constitutional:       General: He is not in acute distress. Appearance: He is well-developed. He is obese. He is not diaphoretic. HENT:      Head: Normocephalic and atraumatic.    Eyes:      Conjunctiva/sclera: Conjunctivae 09/03/2021). Social History:  reports that he has been smoking cigarettes. He has been smoking about 1.00 pack per day. He has never used smokeless tobacco. He reports current alcohol use of about 5.0 standard drinks of alcohol per week. He reports that he does not use drugs. Family History: family history includes COPD in his mother; Heart Disease in his father; No Known Problems in his sister. The patients home medications have been reviewed. Allergies: Patient has no known allergies.     -------------------------------------------------- RESULTS -------------------------------------------------    LABS:  Results for orders placed or performed during the hospital encounter of 07/10/22   COVID-19, Rapid    Specimen: Nasopharyngeal Swab   Result Value Ref Range    SARS-CoV-2, NAAT Not Detected Not Detected   CBC with Auto Differential   Result Value Ref Range    WBC 9.1 4.5 - 11.5 E9/L    RBC 4.78 3.80 - 5.80 E12/L    Hemoglobin 16.2 12.5 - 16.5 g/dL    Hematocrit 46.0 37.0 - 54.0 %    MCV 96.2 80.0 - 99.9 fL    MCH 33.9 26.0 - 35.0 pg    MCHC 35.2 (H) 32.0 - 34.5 %    RDW 12.7 11.5 - 15.0 fL    Platelets 875 799 - 398 E9/L    MPV 10.7 7.0 - 12.0 fL    Neutrophils % 55.9 43.0 - 80.0 %    Immature Granulocytes % 0.4 0.0 - 5.0 %    Lymphocytes % 33.7 20.0 - 42.0 %    Monocytes % 6.8 2.0 - 12.0 %    Eosinophils % 2.6 0.0 - 6.0 %    Basophils % 0.6 0.0 - 2.0 %    Neutrophils Absolute 5.07 1.80 - 7.30 E9/L    Immature Granulocytes # 0.04 E9/L    Lymphocytes Absolute 3.06 1.50 - 4.00 E9/L    Monocytes Absolute 0.62 0.10 - 0.95 E9/L    Eosinophils Absolute 0.24 0.05 - 0.50 E9/L    Basophils Absolute 0.05 0.00 - 0.20 I7/Y   Basic Metabolic Panel w/ Reflex to MG   Result Value Ref Range    Sodium 132 132 - 146 mmol/L    Potassium reflex Magnesium 4.3 3.5 - 5.0 mmol/L    Chloride 96 (L) 98 - 107 mmol/L    CO2 23 22 - 29 mmol/L    Anion Gap 13 7 - 16 mmol/L    Glucose 150 (H) 74 - 99 mg/dL    BUN 20 6 - 23 mg/dL CREATININE 1.1 0.7 - 1.2 mg/dL    GFR Non-African American >60 >=60 mL/min/1.73    GFR African American >60     Calcium 9.3 8.6 - 10.2 mg/dL   Troponin   Result Value Ref Range    Troponin, High Sensitivity 28 (H) 0 - 11 ng/L   Brain Natriuretic Peptide   Result Value Ref Range    Pro- (H) 0 - 125 pg/mL   EKG 12 Lead   Result Value Ref Range    Ventricular Rate 109 BPM    Atrial Rate 109 BPM    P-R Interval 152 ms    QRS Duration 68 ms    Q-T Interval 332 ms    QTc Calculation (Bazett) 447 ms    P Axis 86 degrees    R Axis -21 degrees    T Axis 95 degrees       RADIOLOGY:  CTA CHEST W CONTRAST   Final Result   No evidence of pulmonary embolism or acute pulmonary abnormality. Thickening of the esophageal wall, which could suggest reflux esophagitis. Clinical correlation recommended. Unchanged right adrenal nodule, previously demonstrated on MRI to be lipid   rich adenoma. ------------------------- NURSING NOTES AND VITALS REVIEWED ---------------------------  Date / Time Roomed:  7/10/2022  3:03 PM  ED Bed Assignment:  03/03    The nursing notes within the ED encounter and vital signs as below have been reviewed. Patient Vitals for the past 24 hrs:   BP Temp Temp src Pulse Resp SpO2   07/10/22 1720 (!) 140/80 -- -- 95 17 94 %   07/10/22 1705 136/78 -- -- 87 20 98 %   07/10/22 1606 (!) 162/91 -- -- 88 17 98 %   07/10/22 1550 (!) 159/88 -- -- 95 19 98 %   07/10/22 1536 (!) 170/98 -- -- 89 19 98 %   07/10/22 1512 (!) 138/98 98.2 °F (36.8 °C) Oral (!) 112 26 98 %       Oxygen Saturation Interpretation: Normal    ------------------------------------------ PROGRESS NOTES ------------------------------------------  Re-evaluation(s):  Refer to ED course above. Counseling:  I have spoken with the patient and discussed todays results, in addition to providing specific details for the plan of care and counseling regarding the diagnosis and prognosis.   Their questions are answered at this time and they are agreeable with the plan of admission.    --------------------------------- ADDITIONAL PROVIDER NOTES ---------------------------------  Consultations:  Time: 1645. Spoke with Dr. Kevin Gao. Discussed case. He will admit the patient. This patient's ED course included: a personal history and physicial examination, re-evaluation prior to disposition, multiple bedside re-evaluations, IV medications, cardiac monitoring, continuous pulse oximetry and complex medical decision making and emergency management    This patient has remained hemodynamically stable during their ED course. Diagnosis:  1. Chest pain, unspecified type    2. Acute electrocardiogram changes        Disposition:  Patient's disposition: Admit to telemetry  Patient's condition is fair.       Zina Villarreal, DO Zina Villarreal DO  07/10/22 1731       Zina Villarreal DO  07/10/22 1733
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
## 2022-07-11 LAB
ALBUMIN SERPL-MCNC: 4.1 G/DL (ref 3.5–5.2)
ALP BLD-CCNC: 94 U/L (ref 40–129)
ALT SERPL-CCNC: 13 U/L (ref 0–40)
ANION GAP SERPL CALCULATED.3IONS-SCNC: 12 MMOL/L (ref 7–16)
AST SERPL-CCNC: 14 U/L (ref 0–39)
BASOPHILS ABSOLUTE: 0.02 E9/L (ref 0–0.2)
BASOPHILS RELATIVE PERCENT: 0.2 % (ref 0–2)
BILIRUB SERPL-MCNC: 0.6 MG/DL (ref 0–1.2)
BUN BLDV-MCNC: 20 MG/DL (ref 6–23)
CALCIUM SERPL-MCNC: 9.1 MG/DL (ref 8.6–10.2)
CHLORIDE BLD-SCNC: 99 MMOL/L (ref 98–107)
CHOLESTEROL, TOTAL: 252 MG/DL (ref 0–199)
CO2: 23 MMOL/L (ref 22–29)
CREAT SERPL-MCNC: 1 MG/DL (ref 0.7–1.2)
EKG ATRIAL RATE: 109 BPM
EKG P AXIS: 86 DEGREES
EKG P-R INTERVAL: 152 MS
EKG Q-T INTERVAL: 332 MS
EKG QRS DURATION: 68 MS
EKG QTC CALCULATION (BAZETT): 447 MS
EKG R AXIS: -21 DEGREES
EKG T AXIS: 95 DEGREES
EKG VENTRICULAR RATE: 109 BPM
EOSINOPHILS ABSOLUTE: 0.01 E9/L (ref 0.05–0.5)
EOSINOPHILS RELATIVE PERCENT: 0.1 % (ref 0–6)
GFR AFRICAN AMERICAN: >60
GFR NON-AFRICAN AMERICAN: >60 ML/MIN/1.73
GLUCOSE BLD-MCNC: 189 MG/DL (ref 74–99)
HCT VFR BLD CALC: 44.8 % (ref 37–54)
HDLC SERPL-MCNC: 44 MG/DL
HEMOGLOBIN: 15.2 G/DL (ref 12.5–16.5)
IMMATURE GRANULOCYTES #: 0.08 E9/L
IMMATURE GRANULOCYTES %: 0.8 % (ref 0–5)
LDL CHOLESTEROL CALCULATED: 173 MG/DL (ref 0–99)
LYMPHOCYTES ABSOLUTE: 1.88 E9/L (ref 1.5–4)
LYMPHOCYTES RELATIVE PERCENT: 19.5 % (ref 20–42)
MAGNESIUM: 1.6 MG/DL (ref 1.6–2.6)
MCH RBC QN AUTO: 34.2 PG (ref 26–35)
MCHC RBC AUTO-ENTMCNC: 33.9 % (ref 32–34.5)
MCV RBC AUTO: 100.7 FL (ref 80–99.9)
METER GLUCOSE: 165 MG/DL (ref 74–99)
METER GLUCOSE: 169 MG/DL (ref 74–99)
METER GLUCOSE: 196 MG/DL (ref 74–99)
METER GLUCOSE: 226 MG/DL (ref 74–99)
MONOCYTES ABSOLUTE: 0.53 E9/L (ref 0.1–0.95)
MONOCYTES RELATIVE PERCENT: 5.5 % (ref 2–12)
NEUTROPHILS ABSOLUTE: 7.11 E9/L (ref 1.8–7.3)
NEUTROPHILS RELATIVE PERCENT: 73.9 % (ref 43–80)
PDW BLD-RTO: 12.7 FL (ref 11.5–15)
PLATELET # BLD: 134 E9/L (ref 130–450)
PMV BLD AUTO: 11.3 FL (ref 7–12)
POTASSIUM REFLEX MAGNESIUM: 4.7 MMOL/L (ref 3.5–5)
PROCALCITONIN: 0.05 NG/ML (ref 0–0.08)
RBC # BLD: 4.45 E12/L (ref 3.8–5.8)
SODIUM BLD-SCNC: 134 MMOL/L (ref 132–146)
TOTAL PROTEIN: 7.1 G/DL (ref 6.4–8.3)
TRIGL SERPL-MCNC: 175 MG/DL (ref 0–149)
TROPONIN, HIGH SENSITIVITY: 16 NG/L (ref 0–11)
TSH SERPL DL<=0.05 MIU/L-ACNC: 0.69 UIU/ML (ref 0.27–4.2)
VLDLC SERPL CALC-MCNC: 35 MG/DL
WBC # BLD: 9.6 E9/L (ref 4.5–11.5)

## 2022-07-11 PROCEDURE — 99254 IP/OBS CNSLTJ NEW/EST MOD 60: CPT | Performed by: INTERNAL MEDICINE

## 2022-07-11 PROCEDURE — 6370000000 HC RX 637 (ALT 250 FOR IP): Performed by: PHYSICIAN ASSISTANT

## 2022-07-11 PROCEDURE — 2580000003 HC RX 258: Performed by: NURSE PRACTITIONER

## 2022-07-11 PROCEDURE — 84484 ASSAY OF TROPONIN QUANT: CPT

## 2022-07-11 PROCEDURE — 99233 SBSQ HOSP IP/OBS HIGH 50: CPT | Performed by: INTERNAL MEDICINE

## 2022-07-11 PROCEDURE — 6370000000 HC RX 637 (ALT 250 FOR IP): Performed by: NURSE PRACTITIONER

## 2022-07-11 PROCEDURE — APPSS60 APP SPLIT SHARED TIME 46-60 MINUTES: Performed by: PHYSICIAN ASSISTANT

## 2022-07-11 PROCEDURE — 97530 THERAPEUTIC ACTIVITIES: CPT | Performed by: OCCUPATIONAL THERAPIST

## 2022-07-11 PROCEDURE — 97165 OT EVAL LOW COMPLEX 30 MIN: CPT | Performed by: OCCUPATIONAL THERAPIST

## 2022-07-11 PROCEDURE — APPSS30 APP SPLIT SHARED TIME 16-30 MINUTES: Performed by: NURSE PRACTITIONER

## 2022-07-11 PROCEDURE — 82962 GLUCOSE BLOOD TEST: CPT

## 2022-07-11 PROCEDURE — 85025 COMPLETE CBC W/AUTO DIFF WBC: CPT

## 2022-07-11 PROCEDURE — 84443 ASSAY THYROID STIM HORMONE: CPT

## 2022-07-11 PROCEDURE — 6370000000 HC RX 637 (ALT 250 FOR IP): Performed by: INTERNAL MEDICINE

## 2022-07-11 PROCEDURE — 94640 AIRWAY INHALATION TREATMENT: CPT

## 2022-07-11 PROCEDURE — 1200000000 HC SEMI PRIVATE

## 2022-07-11 PROCEDURE — 80053 COMPREHEN METABOLIC PANEL: CPT

## 2022-07-11 PROCEDURE — 80061 LIPID PANEL: CPT

## 2022-07-11 PROCEDURE — 6360000002 HC RX W HCPCS: Performed by: NURSE PRACTITIONER

## 2022-07-11 PROCEDURE — 36415 COLL VENOUS BLD VENIPUNCTURE: CPT

## 2022-07-11 PROCEDURE — 83735 ASSAY OF MAGNESIUM: CPT

## 2022-07-11 PROCEDURE — 84145 PROCALCITONIN (PCT): CPT

## 2022-07-11 PROCEDURE — 97161 PT EVAL LOW COMPLEX 20 MIN: CPT | Performed by: PHYSICAL THERAPIST

## 2022-07-11 RX ORDER — ASPIRIN 81 MG/1
81 TABLET ORAL DAILY
Status: DISCONTINUED | OUTPATIENT
Start: 2022-07-11 | End: 2022-07-13 | Stop reason: HOSPADM

## 2022-07-11 RX ORDER — METOPROLOL SUCCINATE 25 MG/1
25 TABLET, EXTENDED RELEASE ORAL 2 TIMES DAILY
Status: DISCONTINUED | OUTPATIENT
Start: 2022-07-11 | End: 2022-07-13 | Stop reason: HOSPADM

## 2022-07-11 RX ORDER — LORAZEPAM 0.5 MG/1
0.5 TABLET ORAL 2 TIMES DAILY PRN
Status: DISCONTINUED | OUTPATIENT
Start: 2022-07-11 | End: 2022-07-13 | Stop reason: HOSPADM

## 2022-07-11 RX ORDER — PREDNISONE 10 MG/1
40 TABLET ORAL DAILY
Status: DISCONTINUED | OUTPATIENT
Start: 2022-07-12 | End: 2022-07-13 | Stop reason: HOSPADM

## 2022-07-11 RX ADMIN — ARFORMOTEROL TARTRATE 15 MCG: 15 SOLUTION RESPIRATORY (INHALATION) at 06:44

## 2022-07-11 RX ADMIN — ARFORMOTEROL TARTRATE 15 MCG: 15 SOLUTION RESPIRATORY (INHALATION) at 18:42

## 2022-07-11 RX ADMIN — HEPARIN SODIUM 5000 UNITS: 5000 INJECTION INTRAVENOUS; SUBCUTANEOUS at 21:06

## 2022-07-11 RX ADMIN — IPRATROPIUM BROMIDE AND ALBUTEROL SULFATE 1 AMPULE: .5; 2.5 SOLUTION RESPIRATORY (INHALATION) at 06:44

## 2022-07-11 RX ADMIN — BUDESONIDE 500 MCG: 0.5 SUSPENSION RESPIRATORY (INHALATION) at 18:42

## 2022-07-11 RX ADMIN — METOPROLOL SUCCINATE 100 MG: 25 TABLET, EXTENDED RELEASE ORAL at 09:52

## 2022-07-11 RX ADMIN — METHYLPREDNISOLONE SODIUM SUCCINATE 40 MG: 40 INJECTION, POWDER, FOR SOLUTION INTRAMUSCULAR; INTRAVENOUS at 07:08

## 2022-07-11 RX ADMIN — INSULIN LISPRO 2 UNITS: 100 INJECTION, SOLUTION INTRAVENOUS; SUBCUTANEOUS at 16:37

## 2022-07-11 RX ADMIN — FOLIC ACID 2 MG: 1 TABLET ORAL at 09:51

## 2022-07-11 RX ADMIN — INSULIN LISPRO 1 UNITS: 100 INJECTION, SOLUTION INTRAVENOUS; SUBCUTANEOUS at 11:21

## 2022-07-11 RX ADMIN — METOPROLOL SUCCINATE 25 MG: 25 TABLET, EXTENDED RELEASE ORAL at 21:06

## 2022-07-11 RX ADMIN — IPRATROPIUM BROMIDE AND ALBUTEROL SULFATE 1 AMPULE: .5; 2.5 SOLUTION RESPIRATORY (INHALATION) at 18:42

## 2022-07-11 RX ADMIN — HEPARIN SODIUM 5000 UNITS: 5000 INJECTION INTRAVENOUS; SUBCUTANEOUS at 14:04

## 2022-07-11 RX ADMIN — PRAVASTATIN SODIUM 20 MG: 20 TABLET ORAL at 09:52

## 2022-07-11 RX ADMIN — PANTOPRAZOLE SODIUM 40 MG: 40 TABLET, DELAYED RELEASE ORAL at 07:09

## 2022-07-11 RX ADMIN — ZOLPIDEM TARTRATE 5 MG: 5 TABLET, COATED ORAL at 22:43

## 2022-07-11 RX ADMIN — HEPARIN SODIUM 5000 UNITS: 5000 INJECTION INTRAVENOUS; SUBCUTANEOUS at 07:08

## 2022-07-11 RX ADMIN — IPRATROPIUM BROMIDE AND ALBUTEROL SULFATE 1 AMPULE: .5; 2.5 SOLUTION RESPIRATORY (INHALATION) at 14:35

## 2022-07-11 RX ADMIN — INSULIN LISPRO 1 UNITS: 100 INJECTION, SOLUTION INTRAVENOUS; SUBCUTANEOUS at 23:45

## 2022-07-11 RX ADMIN — SODIUM CHLORIDE, PRESERVATIVE FREE 10 ML: 5 INJECTION INTRAVENOUS at 07:02

## 2022-07-11 RX ADMIN — INSULIN LISPRO 1 UNITS: 100 INJECTION, SOLUTION INTRAVENOUS; SUBCUTANEOUS at 09:55

## 2022-07-11 RX ADMIN — Medication 10 ML: at 09:52

## 2022-07-11 RX ADMIN — BUDESONIDE 500 MCG: 0.5 SUSPENSION RESPIRATORY (INHALATION) at 06:44

## 2022-07-11 RX ADMIN — Medication 10 ML: at 21:07

## 2022-07-11 RX ADMIN — HYDROCODONE BITARTRATE AND ACETAMINOPHEN 1 TABLET: 5; 325 TABLET ORAL at 07:09

## 2022-07-11 RX ADMIN — LORAZEPAM 0.5 MG: 0.5 TABLET ORAL at 11:19

## 2022-07-11 RX ADMIN — IPRATROPIUM BROMIDE AND ALBUTEROL SULFATE 1 AMPULE: .5; 2.5 SOLUTION RESPIRATORY (INHALATION) at 10:46

## 2022-07-11 RX ADMIN — LORAZEPAM 0.5 MG: 0.5 TABLET ORAL at 21:06

## 2022-07-11 RX ADMIN — ASPIRIN 81 MG: 81 TABLET, COATED ORAL at 17:11

## 2022-07-11 ASSESSMENT — PAIN SCALES - GENERAL
PAINLEVEL_OUTOF10: 8
PAINLEVEL_OUTOF10: 0

## 2022-07-11 NOTE — PROGRESS NOTES
[address] [place], [place] [address]ist   Progress Note    Admitting Date and Time: 7/10/2022  3:03 PM  Admit Dx: COPD exacerbation ([address] [J44.1]  Acute electrocardiogram changes [R94.31]  Chest pain, unspecified type [R07.9]    Subjective:    Pt seen and examined. Alert and oriented, sitting up in bed. States he was able to get a little bit of sleep last night. Asked pt about his chest pain, he said it is on the left side, lower-lateral rib cage. Described the pain as a constant dull ache for the last 3-4 weeks, rated 5/10, with intermittent sharp, stabbing pain, rated 7-8/10 that lasts a few minutes. Nothing aggravates the pain, lying on his left side alleviates the pain. Pt states he also received ativan last night, which helped with the pain. Denies accompanying symptoms. States he has had significant financial stressors the last week or 2. He just found out his daughter hasn't been paying the bills. His house is going into foreclosure, vehicle is being repossessed, and most of his utilities are going to be shut off soon. ROS: denies fever, chills, sob, n/v, HA unless stated above.      folic acid  2 mg Oral Daily    metoprolol succinate  100 mg Oral BID    nicotine  1 patch TransDERmal Daily    pantoprazole  40 mg Oral QAM AC    pravastatin  20 mg Oral Daily    sodium chloride flush  5-40 mL IntraVENous 2 times per day    ipratropium-albuterol  1 ampule Inhalation Q4H WA    insulin lispro  0-6 Units SubCUTAneous TID WC    insulin lispro  0-3 Units SubCUTAneous Nightly    methylPREDNISolone  40 mg IntraVENous Q12H    heparin (porcine)  5,000 Units SubCUTAneous 3 times per day    budesonide  500 mcg Nebulization BID    Arformoterol Tartrate  15 mcg Nebulization BID     LORazepam, 0.5 mg, BID PRN  sodium chloride flush, 5-40 mL, PRN  sodium chloride, , PRN  ondansetron, 4 mg, Q8H PRN   Or  ondansetron, 4 mg, Q6H PRN  polyethylene glycol, 17 g, Daily PRN  acetaminophen, 650 mg, Q6H PRN Or  acetaminophen, 650 mg, Q6H PRN  albuterol, 2.5 mg, Q4H PRN  glucose, 4 tablet, PRN  dextrose bolus, 125 mL, PRN   Or  dextrose bolus, 250 mL, PRN  glucagon (rDNA), 1 mg, PRN  dextrose, 100 mL/hr, PRN  HYDROcodone 5 mg - acetaminophen, 1 tablet, Q4H PRN  zolpidem, 5 mg, Nightly PRN         Objective:    BP (!) 159/86   Pulse 65   Temp 97.9 °F (36.6 °C) (Oral)   Resp 24   Ht 5' 11\" (1.803 m)   Wt 160 lb (72.6 kg)   SpO2 97%   BMI 22.32 kg/m²   General Appearance: alert and oriented to person, place and time and in no acute distress  Skin: warm and dry  Head: normocephalic and atraumatic  Eyes: pupils equal, round, and reactive to light, extraocular eye movements intact, conjunctivae normal  Neck: neck supple and non tender without mass   Pulmonary/Chest: Lung sounds clear. No wheezing, rales or rhonchi, diminished air movement, no respiratory distress  Cardiovascular: normal rate, normal S1 and S2 and no carotid bruits  Abdomen: soft, non-tender, non-distended, normal bowel sounds, no masses or organomegaly  Extremities: no cyanosis, no clubbing and no edema  Neurologic: no cranial nerve deficit and speech normal      Recent Labs     07/10/22  1530 07/11/22  0623    134   K 4.3 4.7   CL 96* 99   CO2 23 23   BUN 20 20   CREATININE 1.1 1.0   GLUCOSE 150* 189*   CALCIUM 9.3 9.1       Recent Labs     07/11/22  0623   ALKPHOS 94   PROT 7.1   LABALBU 4.1   BILITOT 0.6   AST 14   ALT 13       Recent Labs     07/10/22  1530 07/11/22  0624   WBC 9.1 9.6   RBC 4.78 4.45   HGB 16.2 15.2   HCT 46.0 44.8   MCV 96.2 100.7*   MCH 33.9 34.2   MCHC 35.2* 33.9   RDW 12.7 12.7    134   MPV 10.7 11.3            Radiology:   CTA CHEST W CONTRAST   Final Result   No evidence of pulmonary embolism or acute pulmonary abnormality. Thickening of the esophageal wall, which could suggest reflux esophagitis. Clinical correlation recommended.       Unchanged right adrenal nodule, previously demonstrated on MRI to be lipid   rich adenoma. Assessment:  Principal Problem:    COPD exacerbation (HCC)  Active Problems:    Essential hypertension    Type 2 diabetes mellitus with hyperglycemia, without long-term current use of insulin (HCC)    Hyperlipidemia  Resolved Problems:    * No resolved hospital problems. *      Plan:  1. COPD exacerbation  -room air at baseline, room air currently  -Continue IV steroids BID  - Pulmicort and Brovana BID, duonebs q4h while awake, albuterol prn  -O2 as needed        2. Elevated troponin and abnormal EKG  -Troponin 26 on 7/10. Will order repeat. - CTA revealed no evidence of pulmonary embolism or acute pulmonary abnormality.   -Describes left-sided chest pain for the last 3-4 weeks with no accompanying symptoms. Pain is reproducible. -Cardiology consulted     3. DM2  -not on any medication, does not check blood sugars at home  -A1c 6.2 on 7/10  - Low dose insulin sliding scale, hypoglycemia protocol, 5 carb diet  -was on Januvia and metformin, held on admission.      4. HTN  -off meds over a year due to loss of insurance and job  -resume metoprolol     5. HLD:   -off meds for over 1 year due to loss of insurance and job  -Total chol 252, , Trigs 175  -Pravastatin started on admission     6. Tobacco abuse:   -smoking cessation counseling.   -nicotine patch for withdrawal.     7. Anxiety:  - left chest pain was reduced by ativan last night (x1 dose). -Ativan 0.5mg BID prn    8. Elevated BNP  -394 on 7/10. Will recheck in am    9. Thickening of esophageal wall:  -PPI daily    10. Right adrenal nodule:  -CTA 7/10: Unchanged right adrenal nodule, previously demonstrated on MRI to be lipid rich adenoma.         Code Status:  full  DVT prophylaxis:  Heparin SC    NOTE: This report was transcribed using voice recognition software. Every effort was made to ensure accuracy; however, inadvertent computerized transcription errors may be present.      Electronically signed by Min Duvall Demarco Downing NP on 7/11/2022 at 2:01 PM
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
## 2022-07-11 NOTE — PROGRESS NOTES
[address] [facility]  [facility]         Date:2022                                                   Patient Name: Dawit Nayak     MRN: [id number]     : 1960     Room: [address] [place] [address]       Evaluating OT: Nikko Rome OTR/L; [id number]       Referring Provider and Orders/Date:   OT eval and treat Start: 07/10/22 1730, End: 07/10/22 1730, ONE TIME, Standing Count: 1 Occurrences, R    Order went unreviewed at transfer on Sun Jul 10, 2022 5:20 PM   JOSSIE Mandel CNP     Diagnosis:   1. Chest pain, unspecified type    2.  Acute electrocardiogram changes         Surgery: none      Pertinent Medical History:        Past Medical History:   Diagnosis Date    Chronic obstructive pulmonary disease ([facility]     Diabetes mellitus ([facility]     Hyperlipidemia     Hypertension           Past Surgical History:   Procedure Laterality Date    APPENDECTOMY      CARDIOVASCULAR STRESS TEST N/A 2021    Lexiscan stress test    COLONOSCOPY      LUNG SURGERY      from scar tissue    NECK SURGERY N/A 2020    EXCISION POSTERIOR SOFT TISSUE NEOPLASM NECK (CPT 99925) performed by Gina Carmona MD at [facility] OR       Precautions:  Fall Risk    Recommended placement: home vs subacute dependent on home status    Assessment of current deficits     [x] Functional mobility  [x]ADLs  [x] Strength               []Cognition     [x] Functional transfers   [x] IADLs         [x] Safety Awareness   [x]Endurance     [] Fine Coordination              [x] Balance      [] Vision/perception   []Sensation      [x]Gross Motor Coordination  [] ROM  [] Delirium                   [] Motor Control     OT PLAN OF CARE   OT POC based on physician orders, patient diagnosis and results of clinical assessment    Frequency/Duration 1-3 days/wk for 2 weeks PRN   Specific OT Treatment Interventions to include:   * Instruction/training on adapted ADL techniques and AE recommendations to increase functional independence within precautions       * Training on energy conservation strategies, correct breathing pattern and techniques to improve independence/tolerance for self-care routine  * Functional transfer/mobility training/DME recommendations for increased independence, safety, and fall prevention  * Patient/Family education to increase follow through with safety techniques and functional independence  * Recommendation of environmental modifications for increased safety with functional transfers/mobility and ADLs  * Therapeutic exercise to improve motor endurance, ROM, and functional strength for ADLs/functional transfers  * Therapeutic activities to facilitate/challenge dynamic balance, stand tolerance for increased safety and independence with ADLs  * Therapeutic activities to facilitate gross/fine motor skills for increased independence with ADLs  * Neuro-muscular re-education: facilitation of righting/equilibrium reactions, midline orientation, scapular stability/mobility, normalization of muscle tone, and facilitation of volitional active controled movement     Recommended Adaptive Equipment/DME:  TBD      Home Living: Patient lives alone in a ranch home with 1 step  to enter without Rail. Laundry on first floor. May not be able to return home at [place].       Bathroom setup: tub shower; standard toilet    DME owned: none     Prior Level of Function: indep with ADLs , indep with IADLs; ambulated indep   Driving: indep   Occupation: retired    Pain Level: none  Cognition: A&O: 4/4; Follows 3 step directions   Memory:  Intact   Sequencing:  Intact   Problem solving:  Intact   Judgement/safety:  Intact    AM-PAC Daily Activity Inpatient   How much help for putting on and taking off regular lower body clothing?: A Little  How much help for Bathing?: A Little  How much help for Toileting?: A Little  How much help for putting on and taking off regular upper body clothing?: A Little  How much help for taking care of personal grooming?: None  How much help for eating meals?: None  AM-PAC Inpatient Daily Activity Raw Score: 20  AM-PAC Inpatient ADL T-Scale Score : 42.03  ADL Inpatient CMS 0-100% Score: 38.32  ADL Inpatient CMS G-Code Modifier : CJ    Functional Assessment:     Initial Eval Status  Date: 7/11/2022   Treatment Status  Date: STGs = LTGs  Time frame: 10-14 days   Feeding Independent   NA-PLOF   Grooming Independent standing at sink for oral care, face wash and hand wash  Independent    UB Dressing Minimal Assist with gown management sitting EOB  Independent    LB Dressing Stand by Assist with noted SOB with socks and shorts. Cues for energy conservation  Independent    Bathing Stand by Assist with sponge bathing in front of seat with suggestion to sit for energy conservation  Independent    Toileting Supervision due to fatigue   Independent    Bed Mobility  Supine to sit: Independent   Sit to supine: Independent     Not Appropriate-PLOF   Functional Transfers Stand by Assist from bed, arm chair and toilet due to SOB  Independent    Functional Mobility Stand by Assist without AD for short distance functional mobility throughout the room to simulate house hold distances.     Independent    Balance Sitting:     Static:  good    Dynamic:fair+  Standing: fair  Sitting:     Dynamic:good  Standing: good   Activity Tolerance Vitals with activity:room air 96% HR 80; ADLs with 97% but pt showing signs of SOB throughout; fair tolerance overall; standing tolerance 2min average  Increase standing tolerance for >3min with stable vital signs for carry over into toileting, functional tranfers and indep in ADLs   Visual/  Perceptual Glasses: reading-Present; WFL    Reports change in vision since admission: No     NA     Hand Dominance  [x] Right  [] Left    AROM (PROM) Strength Additional Info:  Goal:   RUE  WFL 4/5 good  and  FMC/dexterity noted during ADL tasks  Opposition [x] Intact [] Impaired  Finger to nose [x] Intact [] Impaired 5/5MMT generally for carry over into self care, functional transfers and functional mobility with AD. LUE WFL 4/5 good  and  FMC/dexterity noted during ADL tasks  Opposition [x] Intact [] Impaired  Finger to nose [x] Intact [] Impaired 5/5MMT generally for carry over into self care, functional transfers and functional mobility with AD. Hearing: [place]   Sensation: c/o numbness or tingling in [place] feet   Tone: WFL  Edema: none    Comments: Upon arrival patient supine in bed. Pt required min A for most UB ADLs and SBA LB ADLs tasks. Limited with SBA for standing during LB ADLs and functional transfers. The biggest barriers reflect that of functional transfers, functional mobility, UB/LB ADLs, activity tolerance, balance, safety and strengthening. At end of session, patient supine with call light and phone within reach, all lines and tubes intact. Overall patient demonstrated decreased independence and safety during completion of ADL/functional transfer/mobility tasks compared to PLOF. Nursing updated on pt position and status following OT eval. Pt would benefit from continued skilled OT to increase safety and independence with completion of ADL/IADL tasks for functional independence and quality of life. Treatment: OT treatment provided this date includes:   Instruction, education and training on safe facilitation and adapted techniques for completion of ADLs. These include neuromuscular reeducation to facilitate balance/righting reactions,safe functional transfer techniques, proper positioning/alignment to improve interaction with environment and overall function and on adapted techniques/work simplification for completion of ADLs. Education provided on hand/feet placement with bed rails, toilet and body mechanics for fall prevention.  Cues for energy conservation and safety for in the home at [place], including modifications and DME. Extended time to complete all tasks, including skilled monitoring of patient's response during treatment session and vital signs. Prior to and at the end of session, environmental modifications / line management completed for patients safety and efficiency of treatment session. See above for further details. Rehab Potential: Good for established goals     Patient / Family Goal: Increase standing tolerance and activity tolerance      Patient and/or family were instructed on functional diagnosis, prognosis/goals and OT plan of care. Demonstrated good understanding. Eval Complexity: Low  · History: Brief review of medical records and additional review of physical, cognitive, or psychosocial history related to current functional performance  · Exam: 1-3 performance deficits  · Assistance/Modification: Min assistance or modifications required to perform tasks. May have comorbidities that affect occupational performance. Time In: 1302  Time Out: 1332  Total Treatment Time: 30    Min Units   OT Eval Low 97165  x  1   OT Eval Medium 78429      OT Eval High 32899      OT Re-Eval [id number]       Therapeutic Ex 58191       Therapeutic Activities 09018  10 1    ADL/Self Care 08500       Orthotic Management 75829       Manual [id number]     Neuro Re-Ed 52770       Non-Billable Time          Evaluation Time additionally includes thorough review of current medical information, gathering information on past medical history/social history and prior level of function, interpretation of standardized testing/informal observation of tasks, assessment of data and development of plan of care and goals.             Flex Driscoll OTR/L; [id number]

## 2022-07-11 NOTE — CARE COORDINATION
CM note: transition of care; chart reviewed. Pt lives alone, was seen by therapy recommending home vs [facility] pending home status, however these are social issues (APS involved per IDR) and pre-certification would never be obtained as patient is independent, no medical needs. Pt is on room air. Cardiology consult and ordered further testing which include echo and stress test.  Pt will need [facility] PCP list given prior to discharge as he states he lost his when he lost his job and insurance and has been unable to find a provider that takes Medicaid.

## 2022-07-11 NOTE — CONSULTS
Past Medical/ Surgical History. PAST MEDICAL HISTORY:    · Continued tobacco abuse  · Former ETOH abuse  · PAF (noted on telemetry during 3/2021 admission), not currently on [address]  · Was not discharged on [address] 3/2021 in setting of anemia and recurrent mechanical falls  · History of recurrent mechanical falls   · COPD, non-compliant with O2 therapy  · HTN  · HLD, not on statin therapy  · T2DM  · Medical non-compliance  · Right adrenal mass, non-compliant with follow up / evaluation  · TTE (Dr. Claudio Schroeder, 3/2021): LVEF 60%. Mild concentric LVH. Normal RV size and function. No significant VHD. · Lexiscan stress test (9/2021): MPI is normal.  No evidence of stress-induced ischemia or prior MI. Normal LV systolic function, EF 77%. Gated images demonstrate mild inferoseptal hypokinesis and mildly decreased wall thickening globally. TID ratio of 1.25, which is of unknown significance in the absence of corresponding perfusion defect. Low to intermediate risk MPS. PAST SURGICAL HISTORY:    Past Surgical History:   Procedure Laterality Date    APPENDECTOMY      CARDIOVASCULAR STRESS TEST N/A 09/03/2021    Lexiscan stress test    COLONOSCOPY      LUNG SURGERY      from scar tissue    NECK SURGERY N/A 02/05/2020    EXCISION POSTERIOR SOFT TISSUE NEOPLASM NECK (CPT 00275) performed by Latricia Escobar MD at [facility]:  Prior to Admission medications    Medication Sig Start Date End Date Taking?  Authorizing Provider   ibuprofen (ADVIL;MOTRIN) 200 MG tablet Take 600 mg by mouth every 6 hours as needed for Pain   Yes Historical Provider, MD       CURRENT MEDICATIONS:      Current Facility-Administered Medications:     LORazepam (ATIVAN) tablet 0.5 mg, 0.5 mg, Oral, BID PRN, JOSSIE Edwards NP    folic acid (FOLVITE) tablet 2 mg, 2 mg, Oral, Daily, JOSSIE Kumar CNP, 2 mg at 07/11/22 0951    metoprolol succinate (TOPROL XL) extended release tablet 100 mg, 100 mg, Oral, BID, Precilla Vineyards, APRN - CNP, 100 mg at 07/11/22 0952    nicotine (NICODERM CQ) 21 MG/24HR 1 patch, 1 patch, TransDERmal, Daily, Precilla Vineyards, APRN - CNP, 1 patch at 07/11/22 0952    pantoprazole (PROTONIX) tablet 40 mg, 40 mg, Oral, QAM AC, Precilla Vineyards, APRN - CNP, 40 mg at 07/11/22 0709    pravastatin (PRAVACHOL) tablet 20 mg, 20 mg, Oral, Daily, Precilla Vineyards, APRN - CNP, 20 mg at 07/11/22 1522    sodium chloride flush 0.9 % injection 5-40 mL, 5-40 mL, IntraVENous, 2 times per day, Precilla Vineyards, APRN - CNP, 10 mL at 07/11/22 7427    sodium chloride flush 0.9 % injection 5-40 mL, 5-40 mL, IntraVENous, PRN, Precilla Vineyards, APRN - CNP, 10 mL at 07/11/22 0702    0.9 % sodium chloride infusion, , IntraVENous, PRN, Precilla Vineyards, APRN - CNP    ondansetron (ZOFRAN-ODT) disintegrating tablet 4 mg, 4 mg, Oral, Q8H PRN **OR** ondansetron (ZOFRAN) injection 4 mg, 4 mg, IntraVENous, Q6H PRN, Precilla Vineyards, APRN - CNP    polyethylene glycol (GLYCOLAX) packet 17 g, 17 g, Oral, Daily PRN, Precilla Vineyards, APRN - CNP    acetaminophen (TYLENOL) tablet 650 mg, 650 mg, Oral, Q6H PRN **OR** acetaminophen (TYLENOL) suppository 650 mg, 650 mg, Rectal, Q6H PRN, Precilla Vineyards, APRN - CNP    albuterol (PROVENTIL) nebulizer solution 2.5 mg, 2.5 mg, Nebulization, Q4H PRN, Precilla Vineyards, APRN - CNP    ipratropium-albuterol (DUONEB) nebulizer solution 1 ampule, 1 ampule, Inhalation, Q4H WA, Precilla Vineyards, APRN - CNP, 1 ampule at 07/11/22 1046    insulin lispro (HUMALOG) injection vial 0-6 Units, 0-6 Units, SubCUTAneous, TID WC, Precilla Vineyards, APRN - CNP, 1 Units at 07/11/22 0955    insulin lispro (HUMALOG) injection vial 0-3 Units, 0-3 Units, SubCUTAneous, Nightly, Precilla Vineyards, APRN - CNP, 1 Units at 07/10/22 2109    glucose chewable tablet 16 g, 4 tablet, Oral, PRN, Precilla Vineyards, APRN - CNP    dextrose bolus 10% 125 mL, 125 mL, IntraVENous, PRN **OR** dextrose bolus 10% 250 mL, 250 mL, IntraVENous, PRN, Kira Abiel APRN - CNP    glucagon (rDNA) injection 1 mg, 1 mg, IntraMUSCular, PRN, Kira Abiel, APRN - CNP    dextrose 5 % solution, 100 mL/hr, IntraVENous, PRN, Kira Abiel, APRN - CNP    methylPREDNISolone sodium (SOLU-MEDROL) injection 40 mg, 40 mg, IntraVENous, Q12H, Kira Abiel, APRN - CNP, 40 mg at 07/11/22 0708    HYDROcodone-acetaminophen (NORCO) 5-325 MG per tablet 1 tablet, 1 tablet, Oral, Q4H PRN, Kira Abiel APRN - CNP, 1 tablet at 07/11/22 0709    heparin (porcine) injection 5,000 Units, 5,000 Units, SubCUTAneous, 3 times per day, Kira Abiel APRN - CNP, 5,000 Units at 07/11/22 0708    budesonide (PULMICORT) nebulizer suspension 500 mcg, 500 mcg, Nebulization, BID, Kira Abiel APRN - CNP, 500 mcg at 07/11/22 4571    Arformoterol Tartrate (BROVANA) nebulizer solution 15 mcg, 15 mcg, Nebulization, BID, Kira Abiel, APRN - CNP, 15 mcg at 07/11/22 0644    zolpidem (AMBIEN) tablet 5 mg, 5 mg, Oral, Nightly PRN, Pieter Phalen, MD, 5 mg at 07/10/22 9081      ALLERGIES:  Patient has no known allergies. SOCIAL HISTORY:    Lives at home with daughter. Does not require assistance with ambulation. Current tobacco abuse, has smoked 2 packs/day for 20+ years. Former alcohol abuse, now only drinks rarely during social events. Denies former/current illicit drug use    FAMILY HISTORY:   Mother with history of MI in her 76s. Father with history of CAD / CABG in his 76s. Denies other pertinent cardiac family medical history to me at this time      REVIEW OF SYSTEMS:     Negative except as noted above in HPI    PHYSICAL EXAM:   BP (!) 159/86   Pulse 65   Temp 97.9 °F (36.6 °C) (Oral)   Resp 24   Ht 5' 11\" (1.803 m)   Wt 160 lb (72.6 kg)   SpO2 97%   BMI 22.32 kg/m²   CONST:  Well developed, well nourished WM who appears stated age. Awake, alert, cooperative, no apparent distress. HEENT:   Head- Normocephalic, atraumatic. Eyes- Conjunctivae pink, anicteric. Neck-  No stridor, trachea midline, no apparent jugular venous distention. CHEST: Chest symmetrical. No accessory muscle use or intercostal retractions. RESPIRATORY: Lung sounds - diminished with rare wheeze  CARDIOVASCULAR:     No noted carotid bruit. Heart Ausculation- Regular rate and rhythm, no significant murmur appreciated  PV: No lower extremity edema. No varicosities. Pedal pulses palpable, no clubbing or cyanosis. ABDOMEN: Soft, non-tender to light palpation. Bowel sounds present. MS: Good muscle strength and tone. No atrophy or abnormal movements. SKIN: Warm and dry. No statis dermatitis or ulcers. NEURO / PSYCH: Oriented to person, place and time. Speech clear and appropriate. Follows all commands. Pleasant affect. DATA:    Telemetry: SR with PACs versus AF HR in the 60s    Diagnostic:  All diagnostic testing and lab work thus far this admission reviewed in detail. Chest CTA 7/10/2022  Impression  No evidence of pulmonary embolism or acute pulmonary abnormality. Thickening of the esophageal wall, which could suggest reflux esophagitis. Clinical correlation recommended. Unchanged right adrenal nodule, previously demonstrated on MRI to be lipid  rich adenoma.       Intake/Output Summary (Last 24 hours) at 7/11/2022 1059  Last data filed at 7/10/2022 2324  Gross per 24 hour   Intake 10 ml   Output --   Net 10 ml       Labs:   CBC:   Recent Labs     07/10/22  1530 07/11/22  0624   WBC 9.1 9.6   HGB 16.2 15.2   HCT 46.0 44.8    134     BMP:   Recent Labs     07/10/22  1530 07/11/22  0623    134   K 4.3 4.7   CO2 23 23   BUN 20 20   CREATININE 1.1 1.0   LABGLOM >60 >60   CALCIUM 9.3 9.1     HgA1c:   Lab Results   Component Value Date    LABA1C 6.2 (H) 07/10/2022     FASTING LIPID PANEL:  Lab Results   Component Value Date/Time    CHOL 252 07/11/2022 06:23 AM    HDL 44 07/11/2022 06:23 AM    LDLCALC 173 07/11/2022 06:23 AM    TRIG 175 07/11/2022 06:23 AM     LIVER PROFILE:  Recent Labs     07/11/22  0623   AST 14   ALT 13   LABALBU 4.1     Ref Range & Units 07/11/22 0623    Procalcitonin 0.00 - 0.08 ng/mL 0.05       Ref Range & Units 07/10/22 1530   Pro-BNP 0 - 125 pg/mL 394 High        Ref Range & Units 07/10/22 1640 07/10/22 1530   Troponin, High Sensitivity 0 - 11 ng/L 26 High   28 High  CM       Ref Range & Units 07/10/22 1715   Adenovirus by PCR Not Detected Not Detected    Bordetella parapertussis by PCR Not Detected Not Detected    Bordetella pertussis by PCR Not Detected Not Detected    Chlamydophilia pneumoniae by PCR Not Detected Not Detected    Coronavirus 229E by PCR Not Detected Not Detected    Coronavirus HKU1 by PCR Not Detected Not Detected    Coronavirus NL63 by PCR Not Detected Not Detected    Coronavirus OC43 by PCR Not Detected Not Detected    SARS-CoV-2, PCR Not Detected Not Detected    Human Metapneumovirus by PCR Not Detected Not Detected    Human Rhinovirus/Enterovirus by PCR Not Detected Not Detected    Influenza A by PCR Not Detected Not Detected    Influenza B by PCR Not Detected Not Detected    Mycoplasma pneumoniae by PCR Not Detected Not Detected    Parainfluenza Virus 1 by PCR Not Detected Not Detected    Parainfluenza Virus 2 by PCR Not Detected Not Detected    Parainfluenza Virus 3 by PCR Not Detected Not Detected    Parainfluenza Virus 4 by PCR Not Detected Not Detected    Respiratory Syncytial Virus by PCR Not Detected Not Detected       Ref Range & Units 07/10/22 1530   SARS-CoV-2, NAAT Not Detected Not Detected        ASSESSMENT:  · Chest pain, with atypical characteristics  · First noted after recent mechanical fall   · hS-troponin 28 --> 26, pattern not consistent with ACS  · Non-ischemic Lexiscan 9/2021  · EKG with   · COPD exacerbation  · Continued tobacco abuse   · Fatigue on exertion  · PAF, not on [address]  · Poor historian regarding cardiac history  · Noted to be in AF during 3/2021 admission on telemetry -- not initiated on [address] at that time due to anemia and recurrent mechanical falls  · CHADsVASc score 1 (HTN) versus 2 (HTN, DM)  · ST with ectopy versus MAT versus atrial tachycardia/AF on telemetry this admission --> currently NSR 60s  · Mild LVH, LVEF 60%, normal RV size and function and no significant VHD on TTE 3/2021  · HTN, uncontrolled  · HLD, not on statin therapy and uncontrolled  · History of T2DM, however HgbA1c 6.2 this admission (not on medical therapy)  · Medical non-compliance  · Right adrenal mass, non-compliant with follow up  · Former ETOH abuse         RECOMMENDATIONS:  · Continue to monitor on telemetry. If definitive AF/FL on tele, will need [address] initiation   · Zio patch 14 Day upon discharge   · Check magnesium and TSH   · TTE from 3/2021 reviewed, will repeat at this time  · Ideally would benefit from Coronary CTA (however not available at this facility) --> Lexiscan Stress tomorrow. NPO at midnight  · Continue ASA and statin therapy  · Initiate Toprol-XL at lower dose --> 25 mg BID  · Outpatient sleep study recommended  · Rest as per primary service and other consultants  · Further recommendations to follow as per Dr. Rose Marie Armstrong          The above case and recommendations have been discussed and made in collaboration with Dr. Rose Marie Armstrong. NOTE: This report was transcribed using voice recognition software. Every effort was made to ensure accuracy; however, inadvertent computerized transcription errors may be present. Phyllis Chauhan, [address] [place] [address], [place] [id number] Cardiology    Electronically signed by Aquilino Payne PA-C on 7/11/2022 at 10:59 AM       Patient seen and examined and case discussed in detail with cardiology nurse practitioner and agree with assessment and plan and history and physical examination discussed in detail and documented above.  I independently examined the patient reviewed the labs and made medication changes and contributed more than 50% of the patient care.

## 2022-07-12 ENCOUNTER — APPOINTMENT (OUTPATIENT)
Dept: CT IMAGING | Age: 62
DRG: 140 | End: 2022-07-12
Payer: COMMERCIAL

## 2022-07-12 ENCOUNTER — APPOINTMENT (OUTPATIENT)
Dept: NON INVASIVE DIAGNOSTICS | Age: 62
DRG: 140 | End: 2022-07-12
Payer: COMMERCIAL

## 2022-07-12 ENCOUNTER — APPOINTMENT (OUTPATIENT)
Dept: NUCLEAR MEDICINE | Age: 62
DRG: 140 | End: 2022-07-12
Payer: COMMERCIAL

## 2022-07-12 LAB
ALBUMIN SERPL-MCNC: 4.4 G/DL (ref 3.5–5.2)
ALP BLD-CCNC: 112 U/L (ref 40–129)
ALT SERPL-CCNC: 14 U/L (ref 0–40)
ANION GAP SERPL CALCULATED.3IONS-SCNC: 9 MMOL/L (ref 7–16)
AST SERPL-CCNC: 16 U/L (ref 0–39)
BASOPHILS ABSOLUTE: 0.03 E9/L (ref 0–0.2)
BASOPHILS RELATIVE PERCENT: 0.3 % (ref 0–2)
BILIRUB SERPL-MCNC: 0.6 MG/DL (ref 0–1.2)
BUN BLDV-MCNC: 21 MG/DL (ref 6–23)
CALCIUM SERPL-MCNC: 9.4 MG/DL (ref 8.6–10.2)
CHLORIDE BLD-SCNC: 101 MMOL/L (ref 98–107)
CO2: 27 MMOL/L (ref 22–29)
CREAT SERPL-MCNC: 0.9 MG/DL (ref 0.7–1.2)
EOSINOPHILS ABSOLUTE: 0.09 E9/L (ref 0.05–0.5)
EOSINOPHILS RELATIVE PERCENT: 0.8 % (ref 0–6)
GFR AFRICAN AMERICAN: >60
GFR NON-AFRICAN AMERICAN: >60 ML/MIN/1.73
GLUCOSE BLD-MCNC: 132 MG/DL (ref 74–99)
HCT VFR BLD CALC: 46.9 % (ref 37–54)
HEMOGLOBIN: 15.8 G/DL (ref 12.5–16.5)
IMMATURE GRANULOCYTES #: 0.06 E9/L
IMMATURE GRANULOCYTES %: 0.5 % (ref 0–5)
LV EF: 57 %
LV EF: 58 %
LVEF MODALITY: NORMAL
LVEF MODALITY: NORMAL
LYMPHOCYTES ABSOLUTE: 3.45 E9/L (ref 1.5–4)
LYMPHOCYTES RELATIVE PERCENT: 31 % (ref 20–42)
MCH RBC QN AUTO: 34.2 PG (ref 26–35)
MCHC RBC AUTO-ENTMCNC: 33.7 % (ref 32–34.5)
MCV RBC AUTO: 101.5 FL (ref 80–99.9)
METER GLUCOSE: 141 MG/DL (ref 74–99)
METER GLUCOSE: 143 MG/DL (ref 74–99)
METER GLUCOSE: 263 MG/DL (ref 74–99)
MONOCYTES ABSOLUTE: 0.85 E9/L (ref 0.1–0.95)
MONOCYTES RELATIVE PERCENT: 7.6 % (ref 2–12)
NEUTROPHILS ABSOLUTE: 6.65 E9/L (ref 1.8–7.3)
NEUTROPHILS RELATIVE PERCENT: 59.8 % (ref 43–80)
PDW BLD-RTO: 13.1 FL (ref 11.5–15)
PLATELET # BLD: 134 E9/L (ref 130–450)
PMV BLD AUTO: 11.2 FL (ref 7–12)
POTASSIUM REFLEX MAGNESIUM: 4.2 MMOL/L (ref 3.5–5)
PRO-BNP: 431 PG/ML (ref 0–125)
RBC # BLD: 4.62 E12/L (ref 3.8–5.8)
SODIUM BLD-SCNC: 137 MMOL/L (ref 132–146)
TOTAL PROTEIN: 7.4 G/DL (ref 6.4–8.3)
WBC # BLD: 11.1 E9/L (ref 4.5–11.5)

## 2022-07-12 PROCEDURE — 93017 CV STRESS TEST TRACING ONLY: CPT

## 2022-07-12 PROCEDURE — 80053 COMPREHEN METABOLIC PANEL: CPT

## 2022-07-12 PROCEDURE — 99232 SBSQ HOSP IP/OBS MODERATE 35: CPT | Performed by: INTERNAL MEDICINE

## 2022-07-12 PROCEDURE — 6370000000 HC RX 637 (ALT 250 FOR IP): Performed by: NURSE PRACTITIONER

## 2022-07-12 PROCEDURE — 6360000002 HC RX W HCPCS: Performed by: PHYSICIAN ASSISTANT

## 2022-07-12 PROCEDURE — 36415 COLL VENOUS BLD VENIPUNCTURE: CPT

## 2022-07-12 PROCEDURE — 90792 PSYCH DIAG EVAL W/MED SRVCS: CPT

## 2022-07-12 PROCEDURE — 6370000000 HC RX 637 (ALT 250 FOR IP)

## 2022-07-12 PROCEDURE — 6370000000 HC RX 637 (ALT 250 FOR IP): Performed by: PHYSICIAN ASSISTANT

## 2022-07-12 PROCEDURE — APPSS30 APP SPLIT SHARED TIME 16-30 MINUTES: Performed by: NURSE PRACTITIONER

## 2022-07-12 PROCEDURE — 1200000000 HC SEMI PRIVATE

## 2022-07-12 PROCEDURE — 99233 SBSQ HOSP IP/OBS HIGH 50: CPT | Performed by: INTERNAL MEDICINE

## 2022-07-12 PROCEDURE — 6360000004 HC RX CONTRAST MEDICATION: Performed by: PHYSICIAN ASSISTANT

## 2022-07-12 PROCEDURE — 6360000002 HC RX W HCPCS: Performed by: NURSE PRACTITIONER

## 2022-07-12 PROCEDURE — 2580000003 HC RX 258: Performed by: NURSE PRACTITIONER

## 2022-07-12 PROCEDURE — 6370000000 HC RX 637 (ALT 250 FOR IP): Performed by: INTERNAL MEDICINE

## 2022-07-12 PROCEDURE — 78452 HT MUSCLE IMAGE SPECT MULT: CPT

## 2022-07-12 PROCEDURE — 3430000000 HC RX DIAGNOSTIC RADIOPHARMACEUTICAL: Performed by: RADIOLOGY

## 2022-07-12 PROCEDURE — 94640 AIRWAY INHALATION TREATMENT: CPT

## 2022-07-12 PROCEDURE — 78452 HT MUSCLE IMAGE SPECT MULT: CPT | Performed by: INTERNAL MEDICINE

## 2022-07-12 PROCEDURE — A9500 TC99M SESTAMIBI: HCPCS | Performed by: RADIOLOGY

## 2022-07-12 PROCEDURE — 82962 GLUCOSE BLOOD TEST: CPT

## 2022-07-12 PROCEDURE — 85025 COMPLETE CBC W/AUTO DIFF WBC: CPT

## 2022-07-12 PROCEDURE — 83880 ASSAY OF NATRIURETIC PEPTIDE: CPT

## 2022-07-12 PROCEDURE — 70450 CT HEAD/BRAIN W/O DYE: CPT

## 2022-07-12 PROCEDURE — C8929 TTE W OR WO FOL WCON,DOPPLER: HCPCS

## 2022-07-12 RX ORDER — PRAVASTATIN SODIUM 20 MG
40 TABLET ORAL DAILY
Status: DISCONTINUED | OUTPATIENT
Start: 2022-07-13 | End: 2022-07-13 | Stop reason: HOSPADM

## 2022-07-12 RX ADMIN — HYDROCODONE BITARTRATE AND ACETAMINOPHEN 1 TABLET: 5; 325 TABLET ORAL at 18:00

## 2022-07-12 RX ADMIN — REGADENOSON 0.4 MG: 0.08 INJECTION, SOLUTION INTRAVENOUS at 11:27

## 2022-07-12 RX ADMIN — Medication 10 MILLICURIE: at 07:23

## 2022-07-12 RX ADMIN — PREDNISONE 40 MG: 10 TABLET ORAL at 13:53

## 2022-07-12 RX ADMIN — INSULIN LISPRO 2 UNITS: 100 INJECTION, SOLUTION INTRAVENOUS; SUBCUTANEOUS at 20:48

## 2022-07-12 RX ADMIN — Medication 10 ML: at 13:51

## 2022-07-12 RX ADMIN — BUSPIRONE HYDROCHLORIDE 15 MG: 10 TABLET ORAL at 15:16

## 2022-07-12 RX ADMIN — LORAZEPAM 0.5 MG: 0.5 TABLET ORAL at 12:56

## 2022-07-12 RX ADMIN — ARFORMOTEROL TARTRATE 15 MCG: 15 SOLUTION RESPIRATORY (INHALATION) at 18:21

## 2022-07-12 RX ADMIN — METOPROLOL SUCCINATE 25 MG: 25 TABLET, EXTENDED RELEASE ORAL at 13:02

## 2022-07-12 RX ADMIN — BUDESONIDE 500 MCG: 0.5 SUSPENSION RESPIRATORY (INHALATION) at 06:08

## 2022-07-12 RX ADMIN — IPRATROPIUM BROMIDE AND ALBUTEROL SULFATE 1 AMPULE: .5; 2.5 SOLUTION RESPIRATORY (INHALATION) at 18:21

## 2022-07-12 RX ADMIN — PRAVASTATIN SODIUM 20 MG: 20 TABLET ORAL at 13:51

## 2022-07-12 RX ADMIN — Medication 10 ML: at 20:45

## 2022-07-12 RX ADMIN — HEPARIN SODIUM 5000 UNITS: 5000 INJECTION INTRAVENOUS; SUBCUTANEOUS at 15:15

## 2022-07-12 RX ADMIN — INSULIN LISPRO 1 UNITS: 100 INJECTION, SOLUTION INTRAVENOUS; SUBCUTANEOUS at 16:59

## 2022-07-12 RX ADMIN — IPRATROPIUM BROMIDE AND ALBUTEROL SULFATE 1 AMPULE: .5; 2.5 SOLUTION RESPIRATORY (INHALATION) at 14:04

## 2022-07-12 RX ADMIN — BUSPIRONE HYDROCHLORIDE 15 MG: 10 TABLET ORAL at 20:44

## 2022-07-12 RX ADMIN — IPRATROPIUM BROMIDE AND ALBUTEROL SULFATE 1 AMPULE: .5; 2.5 SOLUTION RESPIRATORY (INHALATION) at 06:08

## 2022-07-12 RX ADMIN — HEPARIN SODIUM 5000 UNITS: 5000 INJECTION INTRAVENOUS; SUBCUTANEOUS at 20:44

## 2022-07-12 RX ADMIN — BUDESONIDE 500 MCG: 0.5 SUSPENSION RESPIRATORY (INHALATION) at 18:21

## 2022-07-12 RX ADMIN — HEPARIN SODIUM 5000 UNITS: 5000 INJECTION INTRAVENOUS; SUBCUTANEOUS at 05:09

## 2022-07-12 RX ADMIN — ZOLPIDEM TARTRATE 5 MG: 5 TABLET, COATED ORAL at 22:55

## 2022-07-12 RX ADMIN — PERFLUTREN 1.65 MG: 6.52 INJECTION, SUSPENSION INTRAVENOUS at 07:54

## 2022-07-12 RX ADMIN — ARFORMOTEROL TARTRATE 15 MCG: 15 SOLUTION RESPIRATORY (INHALATION) at 06:08

## 2022-07-12 RX ADMIN — ASPIRIN 81 MG: 81 TABLET, COATED ORAL at 13:02

## 2022-07-12 RX ADMIN — FOLIC ACID 2 MG: 1 TABLET ORAL at 13:51

## 2022-07-12 RX ADMIN — HYDROCODONE BITARTRATE AND ACETAMINOPHEN 1 TABLET: 5; 325 TABLET ORAL at 13:51

## 2022-07-12 RX ADMIN — SERTRALINE 25 MG: 50 TABLET, FILM COATED ORAL at 20:44

## 2022-07-12 RX ADMIN — METOPROLOL SUCCINATE 25 MG: 25 TABLET, EXTENDED RELEASE ORAL at 20:44

## 2022-07-12 ASSESSMENT — PAIN DESCRIPTION - ORIENTATION: ORIENTATION: LEFT

## 2022-07-12 ASSESSMENT — PAIN DESCRIPTION - LOCATION: LOCATION: FOOT

## 2022-07-12 ASSESSMENT — PAIN DESCRIPTION - DESCRIPTORS: DESCRIPTORS: SPASM;SHOOTING

## 2022-07-12 ASSESSMENT — PAIN SCALES - GENERAL
PAINLEVEL_OUTOF10: 5
PAINLEVEL_OUTOF10: 7
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
## 2022-07-12 NOTE — CARE COORDINATION
Follow and P[facility] app[facility]ilani [facility]eng.  Electronically signed by Radha Caba on 7/12/2022 at 11:16 AM

## 2022-07-12 NOTE — PROGRESS NOTES
Inpatient [facility] Cardiology Consultation      Reason for Consult: Chest pain    Consulting Physician: Dr. Gaynell Merlin    Requesting Physician: Dr. Branham Mean    Date of Consultation: 7/12/2022      Underwent stress test today showing the following: I have discussed invasive coronary angiogram with the patient and she is in the agreement and subsequently patient is placed on the cardiac catheterization lab list for tomorrow. The myocardial perfusion imaging was probably normal showing   diaphragmatic attenuation and motion artifact. Stress-induced ischemia   involving the basal inferior wall however cannot be totally excluded   on this study. Of concern also however was the reported transient   ischemic dilatation after stress.       Overall left ventricular systolic function was normal with no regional   wall motion abnormality       Intermediate risk pharmacologic stress exercise treadmill test.               PAST MEDICAL HISTORY:    · Continued tobacco abuse  · Former ETOH abuse  · PAF (noted on telemetry during 3/2021 admission), not currently on [facility]  · Was not discharged on [facility] 3/2021 in setting of anemia and recurrent mechanical falls  · History of recurrent mechanical falls   · COPD, non-compliant with O2 therapy  · HTN  · HLD, not on statin therapy  · T2DM  · Medical non-compliance  · Right adrenal mass, non-compliant with follow up / evaluation  · TTE (Dr. Patsy Estevez, 3/2021): LVEF 60%. Mild concentric LVH. Normal RV size and function. No significant VHD. · Lexiscan stress test (9/2021): MPI is normal.  No evidence of stress-induced ischemia or prior MI. Normal LV systolic function, EF 68%. Gated images demonstrate mild inferoseptal hypokinesis and mildly decreased wall thickening globally. TID ratio of 1.25, which is of unknown significance in the absence of corresponding perfusion defect. Low to intermediate risk MPS.     PAST SURGICAL HISTORY:    Past Surgical History:   Procedure Laterality Date    APPENDECTOMY      CARDIOVASCULAR STRESS TEST N/A 09/03/2021    Lexiscan stress test    COLONOSCOPY      LUNG SURGERY      from scar tissue    NECK SURGERY N/A 02/05/2020    EXCISION POSTERIOR SOFT TISSUE NEOPLASM NECK (CPT 66429) performed by Gina Carmona MD at [facility]:  Prior to Admission medications    Medication Sig Start Date End Date Taking?  Authorizing Provider   ibuprofen (ADVIL;MOTRIN) 200 MG tablet Take 600 mg by mouth every 6 hours as needed for Pain   Yes Historical Provider, MD       CURRENT MEDICATIONS:      Current Facility-Administered Medications:     technetium sestamibi (CARDIOLITE) injection 30 millicurie, 30 millicurie, IntraVENous, ONCE PRN, Xiomara Sellers MD    sertraline (ZOLOFT) tablet 25 mg, 25 mg, Oral, Daily, JOSSIE Nunez CNP    busPIRone (BUSPAR) tablet 15 mg, 15 mg, Oral, BID, JOSSIE Nunez CNP, 15 mg at 07/12/22 1516    LORazepam (ATIVAN) tablet 0.5 mg, 0.5 mg, Oral, BID PRN, JOSSIE Harrington - NP, 0.5 mg at 07/12/22 1256    aspirin EC tablet 81 mg, 81 mg, Oral, Daily, Nidia Fisher PA-C, 81 mg at 07/12/22 1302    metoprolol succinate (TOPROL XL) extended release tablet 25 mg, 25 mg, Oral, BID, Hungjamir Fisher PA-C, 25 mg at 07/12/22 1302    predniSONE (DELTASONE) tablet 40 mg, 40 mg, Oral, Daily, Robbie Burns MD, 40 mg at 30/99/49 4093    folic acid (FOLVITE) tablet 2 mg, 2 mg, Oral, Daily, JOSSIE Mandel CNP, 2 mg at 07/12/22 1351    nicotine (NICODERM CQ) 21 MG/24HR 1 patch, 1 patch, TransDERmal, Daily, JOSSIE Mandel CNP, 1 patch at 07/12/22 1255    pantoprazole (PROTONIX) tablet 40 mg, 40 mg, Oral, QAM AC, JOSSIE Mandel CNP, 40 mg at 07/11/22 0709    pravastatin (PRAVACHOL) tablet 20 mg, 20 mg, Oral, Daily, JOSSIE Mandel CNP, 20 mg at 07/12/22 1351    sodium chloride flush 0.9 % injection 5-40 mL, 5-40 mL, IntraVENous, 2 times per day, JOSSIE Mandel CNP, 10 mL at 07/12/22 1351    sodium chloride flush 0.9 % injection 5-40 mL, 5-40 mL, IntraVENous, PRN, JOSSIE Sylvester CNP, 10 mL at 07/11/22 0702    0.9 % sodium chloride infusion, , IntraVENous, PRN, JOSSIE Sylvester CNP    ondansetron (ZOFRAN-ODT) disintegrating tablet 4 mg, 4 mg, Oral, Q8H PRN **OR** ondansetron (ZOFRAN) injection 4 mg, 4 mg, IntraVENous, Q6H PRN, JOSSIE Sylvester CNP    polyethylene glycol (GLYCOLAX) packet 17 g, 17 g, Oral, Daily PRN, JOSSIE Sylvester CNP    acetaminophen (TYLENOL) tablet 650 mg, 650 mg, Oral, Q6H PRN **OR** acetaminophen (TYLENOL) suppository 650 mg, 650 mg, Rectal, Q6H PRN, JOSSIE Sylvester CNP    albuterol (PROVENTIL) nebulizer solution 2.5 mg, 2.5 mg, Nebulization, Q4H PRN, JOSSIE Sylvester CNP    ipratropium-albuterol (DUONEB) nebulizer solution 1 ampule, 1 ampule, Inhalation, Q4H WA, JOSSIE Sylvester CNP, 1 ampule at 07/12/22 1404    insulin lispro (HUMALOG) injection vial 0-6 Units, 0-6 Units, SubCUTAneous, TID WC, JOSSIE Sylvester CNP, 2 Units at 07/11/22 1637    insulin lispro (HUMALOG) injection vial 0-3 Units, 0-3 Units, SubCUTAneous, Nightly, JOSSIE Sylvester CNP, 1 Units at 07/11/22 2345    glucose chewable tablet 16 g, 4 tablet, Oral, PRN, JOSSIE Sylvester CNP    dextrose bolus 10% 125 mL, 125 mL, IntraVENous, PRN **OR** dextrose bolus 10% 250 mL, 250 mL, IntraVENous, PRN, JOSSIE Sylvester CNP    glucagon (rDNA) injection 1 mg, 1 mg, IntraMUSCular, PRN, JOSSIE Sylvester CNP    dextrose 5 % solution, 100 mL/hr, IntraVENous, PRN, JOSSIE Sylvester CNP    HYDROcodone-acetaminophen (NORCO) 5-325 MG per tablet 1 tablet, 1 tablet, Oral, Q4H PRN, JOSSIE Sylvester CNP, 1 tablet at 07/12/22 1351    heparin (porcine) injection 5,000 Units, 5,000 Units, SubCUTAneous, 3 times per day, JOSSIE Sylvester CNP, 5,000 Units at 07/12/22 3864   budesonide (PULMICORT) nebulizer suspension 500 mcg, 500 mcg, Nebulization, BID, Renella Saber, APRN - CNP, 500 mcg at 07/12/22 0608    Arformoterol Tartrate (BROVANA) nebulizer solution 15 mcg, 15 mcg, Nebulization, BID, Renella Saber, APRN - CNP, 15 mcg at 07/12/22 0122    zolpidem (AMBIEN) tablet 5 mg, 5 mg, Oral, Nightly PRN, Migdalia Huntley MD, 5 mg at 07/11/22 2243      ALLERGIES:  Patient has no known allergies. SOCIAL HISTORY:    Lives at home with daughter. Does not require assistance with ambulation. Current tobacco abuse, has smoked 2 packs/day for 20+ years. Former alcohol abuse, now only drinks rarely during social events. Denies former/current illicit drug use    FAMILY HISTORY:   Mother with history of MI in her 76s. Father with history of CAD / CABG in his 76s. Denies other pertinent cardiac family medical history to me at this time      REVIEW OF SYSTEMS:     Negative except as noted above in HPI    PHYSICAL EXAM:   BP (!) 158/92   Pulse 92   Temp 97.1 °F (36.2 °C) (Infrared)   Resp 20   Ht 5' 11\" (1.803 m)   Wt 160 lb (72.6 kg)   SpO2 96%   BMI 22.32 kg/m²   CONST:  Well developed, well nourished WM who appears stated age. Awake, alert, cooperative, no apparent distress. HEENT:   Head- Normocephalic, atraumatic. Eyes- Conjunctivae pink, anicteric. Neck-  No stridor, trachea midline, no apparent jugular venous distention. CHEST: Chest symmetrical. No accessory muscle use or intercostal retractions. RESPIRATORY: Lung sounds - diminished with rare wheeze  CARDIOVASCULAR:     No noted carotid bruit. Heart Ausculation- Regular rate and rhythm, no significant murmur appreciated  PV: No lower extremity edema. No varicosities. Pedal pulses palpable, no clubbing or cyanosis. ABDOMEN: Soft, non-tender to light palpation. Bowel sounds present. MS: Good muscle strength and tone. No atrophy or abnormal movements. SKIN: Warm and dry. No statis dermatitis or ulcers.   Lennox Benton / PSYCH: Oriented to person, place and time. Speech clear and appropriate. Follows all commands. Pleasant affect. DATA:    Telemetry: SR with PACs versus AF HR in the 60s    Diagnostic:  All diagnostic testing and lab work thus far this admission reviewed in detail. Chest CTA 7/10/2022  Impression  No evidence of pulmonary embolism or acute pulmonary abnormality. Thickening of the esophageal wall, which could suggest reflux esophagitis. Clinical correlation recommended. Unchanged right adrenal nodule, previously demonstrated on MRI to be lipid  rich adenoma.       Intake/Output Summary (Last 24 hours) at 7/12/2022 1544  Last data filed at 7/12/2022 1048  Gross per 24 hour   Intake 0 ml   Output --   Net 0 ml       Labs:   CBC:   Recent Labs     07/11/22  0624 07/12/22  0638   WBC 9.6 11.1   HGB 15.2 15.8   HCT 44.8 46.9    134     BMP:   Recent Labs     07/11/22  0623 07/12/22  0638    137   K 4.7 4.2   CO2 23 27   BUN 20 21   CREATININE 1.0 0.9   LABGLOM >60 >60   CALCIUM 9.1 9.4     HgA1c:   Lab Results   Component Value Date    LABA1C 6.2 (H) 07/10/2022     FASTING LIPID PANEL:  Lab Results   Component Value Date/Time    CHOL 252 07/11/2022 06:23 AM    HDL 44 07/11/2022 06:23 AM    LDLCALC 173 07/11/2022 06:23 AM    TRIG 175 07/11/2022 06:23 AM     LIVER PROFILE:  Recent Labs     07/11/22  0623 07/12/22  0638   AST 14 16   ALT 13 14   LABALBU 4.1 4.4     Ref Range & Units 07/11/22 0623    Procalcitonin 0.00 - 0.08 ng/mL 0.05       Ref Range & Units 07/10/22 1530   Pro-BNP 0 - 125 pg/mL 394 High        Ref Range & Units 07/10/22 1640 07/10/22 1530   Troponin, High Sensitivity 0 - 11 ng/L 26 High   28 High  CM       Ref Range & Units 07/10/22 1715   Adenovirus by PCR Not Detected Not Detected    Bordetella parapertussis by PCR Not Detected Not Detected    Bordetella pertussis by PCR Not Detected Not Detected    Chlamydophilia pneumoniae by PCR Not Detected Not Detected    Coronavirus 229E by cardiac history  · Noted to be in AF during 3/2021 admission on telemetry -- not initiated on [address] at that time due to anemia and recurrent mechanical falls  · CHADsVASc score 1 (HTN) versus 2 (HTN, DM)  If patient is noted to have A. fib he will need anticoagulation and if no A. fib he will need a Zio patch heart monitor for 14 days in the outpatient to rule out atrial fibrillation  He will need outpatient sleep study  · Mild LVH, LVEF 60%, normal RV size and function and no significant VHD on TTE 3/2021  · HTN, uncontrolled  · HLD currently tolerating pravastatin  · History of T2DM, however HgbA1c 6.2 this admission (not on medical therapy)  · Medical non-compliance  · Right adrenal mass, non-compliant with follow up  · Former ETOH abuse           Electronically signed by Ignacio Gandhi MD on 7/12/2022 at 3:44 PM

## 2022-07-12 NOTE — PROGRESS NOTES
[address] [place], [place] [address]ist   Progress Note    Admitting Date and Time: 7/10/2022  3:03 PM  Admit Dx: COPD exacerbation ([address] [J44.1]  Acute electrocardiogram changes [R94.31]  Chest pain, unspecified type [R07.9]    Subjective:    Pt seen and examined. Alert and oriented, sitting off the side of the bed. States he didn't sleep much last night, up thinking most of the night. Has stress test this am, returned to room about an hour ago. Ate lunch, still waiting for his nicotine patch. Left chest pain rated 7/10. Received pain med approx an hour ago, states it did not provide any relief. Describes the pain as tender like a bruise, but is not tender to touch. ROS: denies fever, chills, sob, n/v, HA unless stated above.      sertraline  25 mg Oral Daily    busPIRone  15 mg Oral BID    aspirin  81 mg Oral Daily    metoprolol succinate  25 mg Oral BID    predniSONE  40 mg Oral Daily    folic acid  2 mg Oral Daily    nicotine  1 patch TransDERmal Daily    pantoprazole  40 mg Oral QAM AC    pravastatin  20 mg Oral Daily    sodium chloride flush  5-40 mL IntraVENous 2 times per day    ipratropium-albuterol  1 ampule Inhalation Q4H WA    insulin lispro  0-6 Units SubCUTAneous TID WC    insulin lispro  0-3 Units SubCUTAneous Nightly    heparin (porcine)  5,000 Units SubCUTAneous 3 times per day    budesonide  500 mcg Nebulization BID    Arformoterol Tartrate  15 mcg Nebulization BID     technetium sestamibi, 30 millicurie, ONCE PRN  LORazepam, 0.5 mg, BID PRN  sodium chloride flush, 5-40 mL, PRN  sodium chloride, , PRN  ondansetron, 4 mg, Q8H PRN   Or  ondansetron, 4 mg, Q6H PRN  polyethylene glycol, 17 g, Daily PRN  acetaminophen, 650 mg, Q6H PRN   Or  acetaminophen, 650 mg, Q6H PRN  albuterol, 2.5 mg, Q4H PRN  glucose, 4 tablet, PRN  dextrose bolus, 125 mL, PRN   Or  dextrose bolus, 250 mL, PRN  glucagon (rDNA), 1 mg, PRN  dextrose, 100 mL/hr, PRN  HYDROcodone 5 mg - acetaminophen, 1 tablet, Q4H PRN  zolpidem, 5 mg, Nightly PRN         Objective:    BP (!) 158/92   Pulse 92   Temp 97.1 °F (36.2 °C) (Infrared)   Resp 20   Ht 5' 11\" (1.803 m)   Wt 160 lb (72.6 kg)   SpO2 96%   BMI 22.32 kg/m²      General Appearance: alert and oriented to person, place and time and in no acute distress  Skin: warm and dry  Head: normocephalic and atraumatic  Eyes: pupils equal, round, and reactive to light, extraocular eye movements intact, conjunctivae normal  Neck: neck supple and non tender without mass   Pulmonary/Chest: Lung sounds clear. No wheezing, rales or rhonchi, diminished air movement, no respiratory distress  Cardiovascular: normal rate, normal S1 and S2 and no carotid bruits  Abdomen: soft, non-tender, non-distended, normal bowel sounds, no masses or organomegaly  Extremities: no cyanosis, no clubbing and no edema  Neurologic: no cranial nerve deficit and speech normal      Recent Labs     07/10/22  1530 07/11/22  0623 07/12/22  0638    134 137   K 4.3 4.7 4.2   CL 96* 99 101   CO2 23 23 27   BUN 20 20 21   CREATININE 1.1 1.0 0.9   GLUCOSE 150* 189* 132*   CALCIUM 9.3 9.1 9.4       Recent Labs     07/11/22  0623 07/12/22  0638   ALKPHOS 94 112   PROT 7.1 7.4   LABALBU 4.1 4.4   BILITOT 0.6 0.6   AST 14 16   ALT 13 14       Recent Labs     07/10/22  1530 07/11/22  0624 07/12/22  0638   WBC 9.1 9.6 11.1   RBC 4.78 4.45 4.62   HGB 16.2 15.2 15.8   HCT 46.0 44.8 46.9   MCV 96.2 100.7* 101.5*   MCH 33.9 34.2 34.2   MCHC 35.2* 33.9 33.7   RDW 12.7 12.7 13.1    134 134   MPV 10.7 11.3 11.2            Radiology:   CT HEAD WO CONTRAST   Final Result   1. No acute intracranial abnormality. 2. Prominent mucosal thickening in the paranasal sinuses. RECOMMENDATIONS:   Unavailable         CTA CHEST W CONTRAST   Final Result   No evidence of pulmonary embolism or acute pulmonary abnormality. Thickening of the esophageal wall, which could suggest reflux esophagitis.    Clinical correlation recommended. Unchanged right adrenal nodule, previously demonstrated on MRI to be lipid   rich adenoma. NM Cardiac Stress Test Nuclear Imaging    (Results Pending)       Assessment:  Principal Problem:    COPD exacerbation ([address]  Active Problems:    Adjustment disorder with mixed anxiety and depressed mood    Essential hypertension    Type 2 diabetes mellitus with hyperglycemia, without long-term current use of insulin (HCC)    Hyperlipidemia  Resolved Problems:    * No resolved hospital problems. *      Plan:  1. COPD exacerbation  -room air at baseline, room air currently  - IV steroids discontinued, oral steroids started  - Pulmicort and Brovana BID, duonebs q4h while awake, albuterol prn  -O2 as needed     2. Elevated troponin and abnormal EKG  -Troponin 26 on 7/10, 16 on 7/11  - CTA revealed no evidence of pulmonary embolism or acute pulmonary abnormality   -Cardiology following. NM cardiac stress test reported impression: The myocardial perfusion imaging was probably normal showing  diaphragmatic attenuation and motion artifact. Stress-induced ischemia  involving the basal inferior wall however cannot be totally excluded  on this study. Of concern also however was the reported transient  ischemic dilatation after stress.   Overall left ventricular systolic function was normal with no regional  wall motion abnormality   Intermediate risk pharmacologic stress exercise treadmill test.   Gated SPECT LVEF was calculated to be 57%, with normal myocardial wall motion.   -Awaiting recommendation from cardiology.     3. DM2  -not on any medication, does not check blood sugars at home  -A1c 6.2 on 7/10  - Low dose insulin sliding scale, hypoglycemia protocol, 5 carb diet  -was on Januvia and metformin, held on admission.      4. HTN  -off meds over a year due to loss of insurance and job  -Toprol XL reduced to 25mg BID by cardiology     5.  HLD:   -off meds for over 1 year due to loss of insurance and job  -Total chol 252, , Trigs 175  -Pravastatin 20mg started on admission, increased to 40mg by cardiology     6. Tobacco abuse:   -smoking cessation counseling.   -nicotine patch for withdrawal.     7. Anxiety in the setting of personal and financial stressors:  -Psychiatry following, pt started on Zoloft and Buspar  -Ativan 0.5mg BID prn    8. Elevated BNP  -394 on 7/10. Will recheck in am    9. Thickening of esophageal wall:  -PPI daily    10. Right adrenal nodule:  -CTA 7/10: Unchanged right adrenal nodule, previously demonstrated on MRI to be lipid rich adenoma. 11. Sinus congestion with concern for sinusitis:  -CT head reported findings: SINUSES: Prominent mucosal thickening is seen in the paranasal sinuses, with complete opacification of the bilateral frontal and right anterior ethmoid sinuses.  Near complete opacification of the bilateral maxillary sinuses. The sphenoid sinuses are clear.  Small to moderate effusion is seen in the left mastoid air cells.             Code Status:  full  DVT prophylaxis:  Heparin SC    NOTE: This report was transcribed using voice recognition software. Every effort was made to ensure accuracy; however, inadvertent computerized transcription errors may be present.      Electronically signed by JOSSIE Perez NP on 7/12/2022 at 2:48 PM

## 2022-07-12 NOTE — PROCEDURES
[facility] Nuclear Stress Test:    Cardiologist: Dr. Christiano Liang MD    Date: 7/12/2022    Indications for study: Chest pain    1. No chest pain  2. No new arrhythmias  3. No EKG changes suggestive of stress induced ischemia  4.  Nuclear images pending    Christiano Liang MD  [facility]) Cardiology

## 2022-07-13 ENCOUNTER — HOSPITAL ENCOUNTER (INPATIENT)
Dept: CARDIAC CATH/INVASIVE PROCEDURES | Age: 62
LOS: 1 days | Discharge: HOME | DRG: 191 | End: 2022-07-14
Attending: INTERNAL MEDICINE | Admitting: INTERNAL MEDICINE
Payer: COMMERCIAL

## 2022-07-13 VITALS
DIASTOLIC BLOOD PRESSURE: 79 MMHG | WEIGHT: 160 LBS | HEIGHT: 71 IN | SYSTOLIC BLOOD PRESSURE: 166 MMHG | HEART RATE: 82 BPM | TEMPERATURE: 98 F | RESPIRATION RATE: 19 BRPM | OXYGEN SATURATION: 97 % | BODY MASS INDEX: 22.4 KG/M2

## 2022-07-13 DIAGNOSIS — I25.10 CAD IN NATIVE ARTERY: ICD-10-CM

## 2022-07-13 LAB
ABO/RH: NORMAL
ALBUMIN SERPL-MCNC: 4.1 G/DL (ref 3.5–5.2)
ALP BLD-CCNC: 95 U/L (ref 40–129)
ALT SERPL-CCNC: 18 U/L (ref 0–40)
ANION GAP SERPL CALCULATED.3IONS-SCNC: 9 MMOL/L (ref 7–16)
ANTIBODY SCREEN: NORMAL
AST SERPL-CCNC: 16 U/L (ref 0–39)
BASOPHILS ABSOLUTE: 0.03 E9/L (ref 0–0.2)
BASOPHILS RELATIVE PERCENT: 0.3 % (ref 0–2)
BILIRUB SERPL-MCNC: 0.6 MG/DL (ref 0–1.2)
BUN BLDV-MCNC: 23 MG/DL (ref 6–23)
CALCIUM SERPL-MCNC: 9 MG/DL (ref 8.6–10.2)
CHLORIDE BLD-SCNC: 99 MMOL/L (ref 98–107)
CO2: 28 MMOL/L (ref 22–29)
CREAT SERPL-MCNC: 1.1 MG/DL (ref 0.7–1.2)
EOSINOPHILS ABSOLUTE: 0.09 E9/L (ref 0.05–0.5)
EOSINOPHILS RELATIVE PERCENT: 0.9 % (ref 0–6)
GFR AFRICAN AMERICAN: >60
GFR NON-AFRICAN AMERICAN: >60 ML/MIN/1.73
GLUCOSE BLD-MCNC: 128 MG/DL (ref 74–99)
HCT VFR BLD CALC: 45.4 % (ref 37–54)
HEMOGLOBIN: 14.9 G/DL (ref 12.5–16.5)
IMMATURE GRANULOCYTES #: 0.06 E9/L
IMMATURE GRANULOCYTES %: 0.6 % (ref 0–5)
LYMPHOCYTES ABSOLUTE: 3.31 E9/L (ref 1.5–4)
LYMPHOCYTES RELATIVE PERCENT: 32.8 % (ref 20–42)
MCH RBC QN AUTO: 33.9 PG (ref 26–35)
MCHC RBC AUTO-ENTMCNC: 32.8 % (ref 32–34.5)
MCV RBC AUTO: 103.2 FL (ref 80–99.9)
METER GLUCOSE: 122 MG/DL (ref 74–99)
METER GLUCOSE: 128 MG/DL (ref 74–99)
METER GLUCOSE: 178 MG/DL (ref 74–99)
MONOCYTES ABSOLUTE: 0.7 E9/L (ref 0.1–0.95)
MONOCYTES RELATIVE PERCENT: 6.9 % (ref 2–12)
NEUTROPHILS ABSOLUTE: 5.9 E9/L (ref 1.8–7.3)
NEUTROPHILS RELATIVE PERCENT: 58.5 % (ref 43–80)
PDW BLD-RTO: 12.9 FL (ref 11.5–15)
PLATELET # BLD: 124 E9/L (ref 130–450)
PMV BLD AUTO: 10.9 FL (ref 7–12)
POTASSIUM REFLEX MAGNESIUM: 4.3 MMOL/L (ref 3.5–5)
RBC # BLD: 4.4 E12/L (ref 3.8–5.8)
SODIUM BLD-SCNC: 136 MMOL/L (ref 132–146)
TOTAL PROTEIN: 6.9 G/DL (ref 6.4–8.3)
WBC # BLD: 10.1 E9/L (ref 4.5–11.5)

## 2022-07-13 PROCEDURE — 36415 COLL VENOUS BLD VENIPUNCTURE: CPT

## 2022-07-13 PROCEDURE — G0379 DIRECT REFER HOSPITAL OBSERV: HCPCS

## 2022-07-13 PROCEDURE — C1769 GUIDE WIRE: HCPCS

## 2022-07-13 PROCEDURE — 2140000000 HC CCU INTERMEDIATE R&B

## 2022-07-13 PROCEDURE — 6360000002 HC RX W HCPCS: Performed by: NURSE PRACTITIONER

## 2022-07-13 PROCEDURE — 97116 GAIT TRAINING THERAPY: CPT

## 2022-07-13 PROCEDURE — G0378 HOSPITAL OBSERVATION PER HR: HCPCS

## 2022-07-13 PROCEDURE — 6360000002 HC RX W HCPCS

## 2022-07-13 PROCEDURE — 94664 DEMO&/EVAL PT USE INHALER: CPT

## 2022-07-13 PROCEDURE — 93454 CORONARY ARTERY ANGIO S&I: CPT

## 2022-07-13 PROCEDURE — 6370000000 HC RX 637 (ALT 250 FOR IP): Performed by: INTERNAL MEDICINE

## 2022-07-13 PROCEDURE — 2709999900 HC NON-CHARGEABLE SUPPLY

## 2022-07-13 PROCEDURE — 86850 RBC ANTIBODY SCREEN: CPT

## 2022-07-13 PROCEDURE — 94640 AIRWAY INHALATION TREATMENT: CPT

## 2022-07-13 PROCEDURE — 82962 GLUCOSE BLOOD TEST: CPT

## 2022-07-13 PROCEDURE — 6370000000 HC RX 637 (ALT 250 FOR IP)

## 2022-07-13 PROCEDURE — B2111ZZ FLUOROSCOPY OF MULTIPLE CORONARY ARTERIES USING LOW OSMOLAR CONTRAST: ICD-10-PCS | Performed by: INTERNAL MEDICINE

## 2022-07-13 PROCEDURE — 4A023N7 MEASUREMENT OF CARDIAC SAMPLING AND PRESSURE, LEFT HEART, PERCUTANEOUS APPROACH: ICD-10-PCS | Performed by: INTERNAL MEDICINE

## 2022-07-13 PROCEDURE — 6370000000 HC RX 637 (ALT 250 FOR IP): Performed by: PHYSICIAN ASSISTANT

## 2022-07-13 PROCEDURE — 85025 COMPLETE CBC W/AUTO DIFF WBC: CPT

## 2022-07-13 PROCEDURE — 6370000000 HC RX 637 (ALT 250 FOR IP): Performed by: NURSE PRACTITIONER

## 2022-07-13 PROCEDURE — 93454 CORONARY ARTERY ANGIO S&I: CPT | Performed by: INTERNAL MEDICINE

## 2022-07-13 PROCEDURE — 86901 BLOOD TYPING SEROLOGIC RH(D): CPT

## 2022-07-13 PROCEDURE — C1894 INTRO/SHEATH, NON-LASER: HCPCS

## 2022-07-13 PROCEDURE — C1887 CATHETER, GUIDING: HCPCS

## 2022-07-13 PROCEDURE — 2580000003 HC RX 258: Performed by: NURSE PRACTITIONER

## 2022-07-13 PROCEDURE — 2500000003 HC RX 250 WO HCPCS

## 2022-07-13 PROCEDURE — 80053 COMPREHEN METABOLIC PANEL: CPT

## 2022-07-13 PROCEDURE — 2580000003 HC RX 258: Performed by: INTERNAL MEDICINE

## 2022-07-13 PROCEDURE — 86900 BLOOD TYPING SEROLOGIC ABO: CPT

## 2022-07-13 RX ORDER — SODIUM CHLORIDE 9 MG/ML
INJECTION, SOLUTION INTRAVENOUS CONTINUOUS
Status: ACTIVE | OUTPATIENT
Start: 2022-07-13 | End: 2022-07-14

## 2022-07-13 RX ORDER — SODIUM CHLORIDE 9 MG/ML
INJECTION, SOLUTION INTRAVENOUS PRN
Status: DISCONTINUED | OUTPATIENT
Start: 2022-07-13 | End: 2022-07-14 | Stop reason: HOSPADM

## 2022-07-13 RX ORDER — POLYETHYLENE GLYCOL 3350 17 G/17G
17 POWDER, FOR SOLUTION ORAL DAILY PRN
Status: DISCONTINUED | OUTPATIENT
Start: 2022-07-13 | End: 2022-07-14 | Stop reason: HOSPADM

## 2022-07-13 RX ORDER — POLYETHYLENE GLYCOL 3350 17 G/17G
17 POWDER, FOR SOLUTION ORAL DAILY PRN
Qty: 10 EACH | Refills: 0 | Status: SHIPPED | OUTPATIENT
Start: 2022-07-13 | End: 2022-07-23

## 2022-07-13 RX ORDER — ALBUTEROL SULFATE 2.5 MG/3ML
2.5 SOLUTION RESPIRATORY (INHALATION) EVERY 6 HOURS PRN
Status: DISCONTINUED | OUTPATIENT
Start: 2022-07-13 | End: 2022-07-14 | Stop reason: HOSPADM

## 2022-07-13 RX ORDER — METOPROLOL SUCCINATE 25 MG/1
25 TABLET, EXTENDED RELEASE ORAL 2 TIMES DAILY
Status: DISCONTINUED | OUTPATIENT
Start: 2022-07-13 | End: 2022-07-14 | Stop reason: HOSPADM

## 2022-07-13 RX ORDER — DEXTROSE MONOHYDRATE 50 MG/ML
100 INJECTION, SOLUTION INTRAVENOUS PRN
Status: DISCONTINUED | OUTPATIENT
Start: 2022-07-13 | End: 2022-07-14 | Stop reason: HOSPADM

## 2022-07-13 RX ORDER — PANTOPRAZOLE SODIUM 40 MG/1
40 TABLET, DELAYED RELEASE ORAL DAILY
Status: DISCONTINUED | OUTPATIENT
Start: 2022-07-14 | End: 2022-07-14 | Stop reason: HOSPADM

## 2022-07-13 RX ORDER — ZOLPIDEM TARTRATE 5 MG/1
5 TABLET ORAL ONCE
Status: COMPLETED | OUTPATIENT
Start: 2022-07-13 | End: 2022-07-13

## 2022-07-13 RX ORDER — ACETAMINOPHEN 325 MG/1
650 TABLET ORAL EVERY 4 HOURS PRN
Status: DISCONTINUED | OUTPATIENT
Start: 2022-07-13 | End: 2022-07-14 | Stop reason: HOSPADM

## 2022-07-13 RX ORDER — LANOLIN ALCOHOL/MO/W.PET/CERES
3 CREAM (GRAM) TOPICAL NIGHTLY PRN
Status: DISCONTINUED | OUTPATIENT
Start: 2022-07-13 | End: 2022-07-13

## 2022-07-13 RX ORDER — ARFORMOTEROL TARTRATE 15 UG/2ML
15 SOLUTION RESPIRATORY (INHALATION) 2 TIMES DAILY
Status: DISCONTINUED | OUTPATIENT
Start: 2022-07-13 | End: 2022-07-14 | Stop reason: HOSPADM

## 2022-07-13 RX ORDER — PRAVASTATIN SODIUM 20 MG
40 TABLET ORAL NIGHTLY
Status: DISCONTINUED | OUTPATIENT
Start: 2022-07-14 | End: 2022-07-14 | Stop reason: HOSPADM

## 2022-07-13 RX ORDER — METOPROLOL SUCCINATE 25 MG/1
25 TABLET, EXTENDED RELEASE ORAL 2 TIMES DAILY
Qty: 60 TABLET | Refills: 0 | Status: ON HOLD | OUTPATIENT
Start: 2022-07-13 | End: 2022-07-14 | Stop reason: HOSPADM

## 2022-07-13 RX ORDER — NICOTINE 21 MG/24HR
1 PATCH, TRANSDERMAL 24 HOURS TRANSDERMAL DAILY
Qty: 30 PATCH | Refills: 0 | Status: SHIPPED | OUTPATIENT
Start: 2022-07-13 | End: 2022-09-01 | Stop reason: CLARIF

## 2022-07-13 RX ORDER — PREDNISONE 20 MG/1
40 TABLET ORAL DAILY
Qty: 6 TABLET | Refills: 0 | Status: SHIPPED | OUTPATIENT
Start: 2022-07-14 | End: 2022-07-17

## 2022-07-13 RX ORDER — PRAVASTATIN SODIUM 40 MG
40 TABLET ORAL DAILY
Qty: 30 TABLET | Refills: 0 | Status: ON HOLD | OUTPATIENT
Start: 2022-07-13 | End: 2022-07-14 | Stop reason: SDUPTHER

## 2022-07-13 RX ORDER — SODIUM CHLORIDE 0.9 % (FLUSH) 0.9 %
5-40 SYRINGE (ML) INJECTION PRN
Status: DISCONTINUED | OUTPATIENT
Start: 2022-07-13 | End: 2022-07-14 | Stop reason: HOSPADM

## 2022-07-13 RX ORDER — OMEPRAZOLE 40 MG/1
40 CAPSULE, DELAYED RELEASE ORAL DAILY PRN
Qty: 30 CAPSULE | Refills: 0 | Status: SHIPPED | OUTPATIENT
Start: 2022-07-13 | End: 2022-08-01 | Stop reason: SDUPTHER

## 2022-07-13 RX ORDER — BUSPIRONE HYDROCHLORIDE 5 MG/1
15 TABLET ORAL 2 TIMES DAILY
Status: DISCONTINUED | OUTPATIENT
Start: 2022-07-13 | End: 2022-07-14 | Stop reason: HOSPADM

## 2022-07-13 RX ORDER — ASPIRIN 81 MG/1
81 TABLET ORAL DAILY
Qty: 30 TABLET | Refills: 3 | Status: SHIPPED | OUTPATIENT
Start: 2022-07-14 | End: 2022-08-01 | Stop reason: SDUPTHER

## 2022-07-13 RX ORDER — ALBUTEROL SULFATE 90 UG/1
2 AEROSOL, METERED RESPIRATORY (INHALATION) EVERY 6 HOURS PRN
Qty: 18 G | Refills: 0 | Status: SHIPPED | OUTPATIENT
Start: 2022-07-13 | End: 2022-08-01 | Stop reason: SDUPTHER

## 2022-07-13 RX ORDER — BUDESONIDE 0.5 MG/2ML
0.5 INHALANT ORAL 2 TIMES DAILY
Status: DISCONTINUED | OUTPATIENT
Start: 2022-07-13 | End: 2022-07-14 | Stop reason: HOSPADM

## 2022-07-13 RX ORDER — ZOLPIDEM TARTRATE 10 MG/1
10 TABLET ORAL NIGHTLY PRN
COMMUNITY
End: 2022-08-01 | Stop reason: ALTCHOICE

## 2022-07-13 RX ORDER — ASPIRIN 81 MG/1
81 TABLET ORAL DAILY
Status: DISCONTINUED | OUTPATIENT
Start: 2022-07-14 | End: 2022-07-14 | Stop reason: HOSPADM

## 2022-07-13 RX ORDER — SERTRALINE HYDROCHLORIDE 25 MG/1
25 TABLET, FILM COATED ORAL DAILY
Qty: 30 TABLET | Refills: 0 | Status: SHIPPED | OUTPATIENT
Start: 2022-07-13 | End: 2022-08-01 | Stop reason: SDUPTHER

## 2022-07-13 RX ORDER — BUSPIRONE HYDROCHLORIDE 15 MG/1
15 TABLET ORAL 2 TIMES DAILY
Qty: 60 TABLET | Refills: 0 | Status: SHIPPED | OUTPATIENT
Start: 2022-07-13 | End: 2022-08-01 | Stop reason: SINTOL

## 2022-07-13 RX ORDER — NICOTINE 21 MG/24HR
1 PATCH, TRANSDERMAL 24 HOURS TRANSDERMAL DAILY
Status: DISCONTINUED | OUTPATIENT
Start: 2022-07-14 | End: 2022-07-14 | Stop reason: HOSPADM

## 2022-07-13 RX ORDER — SODIUM CHLORIDE 0.9 % (FLUSH) 0.9 %
5-40 SYRINGE (ML) INJECTION EVERY 12 HOURS SCHEDULED
Status: DISCONTINUED | OUTPATIENT
Start: 2022-07-13 | End: 2022-07-14 | Stop reason: HOSPADM

## 2022-07-13 RX ORDER — HYDROCODONE BITARTRATE AND ACETAMINOPHEN 5; 325 MG/1; MG/1
1 TABLET ORAL EVERY 6 HOURS PRN
Qty: 12 TABLET | Refills: 0 | Status: SHIPPED | OUTPATIENT
Start: 2022-07-13 | End: 2022-07-16

## 2022-07-13 RX ORDER — LORAZEPAM 0.5 MG/1
0.5 TABLET ORAL 2 TIMES DAILY PRN
Qty: 6 TABLET | Refills: 0 | Status: SHIPPED | OUTPATIENT
Start: 2022-07-13 | End: 2022-07-16

## 2022-07-13 RX ADMIN — SODIUM CHLORIDE: 9 INJECTION, SOLUTION INTRAVENOUS at 21:57

## 2022-07-13 RX ADMIN — ARFORMOTEROL TARTRATE 15 MCG: 15 SOLUTION RESPIRATORY (INHALATION) at 06:20

## 2022-07-13 RX ADMIN — ASPIRIN 81 MG: 81 TABLET, COATED ORAL at 08:29

## 2022-07-13 RX ADMIN — HYDROCODONE BITARTRATE AND ACETAMINOPHEN 1 TABLET: 5; 325 TABLET ORAL at 08:30

## 2022-07-13 RX ADMIN — METOPROLOL SUCCINATE 25 MG: 25 TABLET, EXTENDED RELEASE ORAL at 22:00

## 2022-07-13 RX ADMIN — METOPROLOL SUCCINATE 25 MG: 25 TABLET, EXTENDED RELEASE ORAL at 08:30

## 2022-07-13 RX ADMIN — SODIUM CHLORIDE, PRESERVATIVE FREE 10 ML: 5 INJECTION INTRAVENOUS at 22:01

## 2022-07-13 RX ADMIN — ARFORMOTEROL TARTRATE 15 MCG: 15 SOLUTION RESPIRATORY (INHALATION) at 21:04

## 2022-07-13 RX ADMIN — Medication 10 ML: at 08:36

## 2022-07-13 RX ADMIN — BUSPIRONE HYDROCHLORIDE 15 MG: 10 TABLET ORAL at 08:30

## 2022-07-13 RX ADMIN — PREDNISONE 40 MG: 10 TABLET ORAL at 08:30

## 2022-07-13 RX ADMIN — PRAVASTATIN SODIUM 40 MG: 20 TABLET ORAL at 08:29

## 2022-07-13 RX ADMIN — LORAZEPAM 0.5 MG: 0.5 TABLET ORAL at 10:27

## 2022-07-13 RX ADMIN — BUDESONIDE 500 MCG: 0.5 SUSPENSION RESPIRATORY (INHALATION) at 06:19

## 2022-07-13 RX ADMIN — BUSPIRONE HYDROCHLORIDE 15 MG: 5 TABLET ORAL at 22:04

## 2022-07-13 RX ADMIN — BUDESONIDE 500 MCG: 0.5 SUSPENSION RESPIRATORY (INHALATION) at 21:05

## 2022-07-13 RX ADMIN — ZOLPIDEM TARTRATE 5 MG: 5 TABLET ORAL at 22:00

## 2022-07-13 RX ADMIN — IPRATROPIUM BROMIDE AND ALBUTEROL SULFATE 1 AMPULE: .5; 2.5 SOLUTION RESPIRATORY (INHALATION) at 06:19

## 2022-07-13 RX ADMIN — FOLIC ACID 2 MG: 1 TABLET ORAL at 08:30

## 2022-07-13 RX ADMIN — IPRATROPIUM BROMIDE AND ALBUTEROL SULFATE 1 AMPULE: .5; 2.5 SOLUTION RESPIRATORY (INHALATION) at 10:01

## 2022-07-13 NOTE — PROGRESS NOTES
Physical Therapy    Physical Therapy Treatment Note/Plan of Care    Room #:  0423/0423-01  Patient Name: Jennifer Olea  YOB: 1960  MRN: [id number]    Date of Service: 7/13/2022     Tentative placement recommendation: Home versus facility if home condition non returnable  states water to be shut off   Equipment recommendation: To be determined      Evaluating Physical Therapist: Roger Bone, PT #[id number]                                                          Krystin Henry, Student Physical Therapist    Specific Provider Orders/Date/Referring Provider : 07/10/22 1730   PT eval and treat Start: 07/10/22 1730, End: 07/10/22 1730, ONE TIME, Standing Count: 1 Occurrences, R    Order went unreviewed at transfer on Sun Jul 10, 2022 5:20 PM   JOSSIE Newton CNP      Admitting Diagnosis:   COPD exacerbation [facility]) [J44.1]  Acute electrocardiogram changes [R94.31]  Chest pain, unspecified type [R07.9]    Admitted with left sided chest pain, very short of breath, depression due to financial issues  has been without medications for over a year    Surgery: none  Visit Diagnoses       Codes    Acute electrocardiogram changes     R94.31          Patient Active Problem List   Diagnosis    Epidermal cyst of neck    Cardiac arrhythmia    Hypomagnesemia    Chest pain    Syncope and collapse    Right adrenal mass ([address]    Lesion of right native kidney    Essential hypertension    Type 2 diabetes mellitus with hyperglycemia, without long-term current use of insulin (HCC)    Chronic obstructive pulmonary disease (HCC)    Hyperlipidemia    Macrocytosis    COPD exacerbation ([address]    Adjustment disorder with mixed anxiety and depressed mood        ASSESSMENT of Current Deficits Patient exhibits decreased strength, balance and endurance impairing functional mobility, gait , gait distance and tolerance to activity. Patient improved with tolerance to activity and endurance.  Patient ambulates without assistive device with no loss of balance or unsteadiness noted. Patient ambulates on lateral side and heel of left foot due to sharp pain. Patient requires skilled physical therapy to address concerns listed above to increase safety and independence at discharge. PHYSICAL THERAPY  PLAN OF CARE       Physical therapy plan of care is established based on physician order,  patient diagnosis and clinical assessment    Current Treatment Recommendations:    -Standing Balance: Perform strengthening exercises in standing to promote motor control with or without upper extremity support   -Gait: Gait training and Standing activities to improve: base of support, weight shift, weight bearing    -Endurance: Utilize Supervised activities to increase level of endurance to allow for safe functional mobility including transfers and gait   -Stairs: Stair training with instruction on proper technique and hand placement on rail    PT long term treatment goals are located in below grid    Patient and or family understand(s) diagnosis, prognosis, and plan of care. Frequency of treatments: Patient will be seen  3-5 times/week.          Prior Level of Function: Patient ambulated independently   Rehab Potential: good  for baseline    Past medical history:   Past Medical History:   Diagnosis Date    Chronic obstructive pulmonary disease ([address]     Diabetes mellitus ([address]     H/O cardiovascular stress test 07/12/2022    Lexiscan    Hyperlipidemia     Hypertension      Past Surgical History:   Procedure Laterality Date    APPENDECTOMY      CARDIOVASCULAR STRESS TEST N/A 09/03/2021    Lexiscan stress test    COLONOSCOPY      LUNG SURGERY      from scar tissue    NECK SURGERY N/A 02/05/2020    EXCISION POSTERIOR SOFT TISSUE NEOPLASM NECK (CPT 83466) performed by Abelardo Stanton MD at [address]:     Precautions: Up as tolerated, falls and alarm   Social history: Patient lives alone in a ranch home  with 1 step to enter without Rail  Tub shower      Equipment owned: None    AM-PAC Basic Mobility       AM-PAC Mobility Inpatient   How much difficulty turning over in bed?: None  How much difficulty sitting down on / standing up from a chair with arms?: None  How much difficulty moving from lying on back to sitting on side of bed?: None  How much help from another person moving to and from a bed to a chair?: None  How much help from another person needed to walk in hospital room?: A Little  How much help from another person for climbing 3-5 steps with a railing?: A Little  AM-[facility] Inpatient Mobility Raw Score : 22  AM-PAC Inpatient T-Scale Score : 53.28  Mobility Inpatient CMS 0-100% Score: 20.91  Mobility Inpatient CMS G-Code Modifier : [address] cleared patient for PT treatment. OBJECTIVE;   Initial Evaluation  Date: 07/11/2022 Treatment Date:  7/13/2022     Short Term/ Long Term   Goals   Was pt agreeable to Eval/treatment? Yes yes To be met in 2 days   Pain level unrated pain  in left chest near intercostals and intermittently in left foot during ambulation  L ribs 7/10 and L foot no number assigned    Bed Mobility    Rolling: Not assessed patient seated edge of bed    Supine to sit: Not assessed patient seated edge of bed    Sit to supine: Not assessed     Scooting: Independent    Rolling: Not assessed patient seated edge of bed   Supine to sit: Not assessed patient seated edge of bed   Sit to supine: Independent   Scooting: Independent       Transfers Sit to stand: Independent  Sit to stand: Independent         Ambulation    100x2 feet using  no device with Supervision   Seated rest break required due to shortness of breath. Oxygen levels remained steady.   2x100 feet using  no device with Supervision        250 feet using  no device with Independent    Stair negotiation: ascended and descended   Not assessed     1 step with upper extremity support of one handrail to allow for safe entry into the home    ROM Within functional limits        Strength BUE:  4/5  RLE:  3+/5  LLE:  3+/5  Increase strength in affected mm groups by 1/3 grade   Balance Sitting EOB:  good   Dynamic Standing:  fair + Sitting EOB: good   Dynamic Standing: good    Sitting EOB:  good   Dynamic Standing: good      Patient is Alert & Oriented x person, place, time and situation and follows directions. Patient is anxious about financial situation and how we will afford returning home. Sensation:  Patient  reports numbness bilateral feet     Edema:  none noted   Endurance: fair  +    Vitals: room air   Blood Pressure at rest  Blood Pressure during session    Heart Rate at rest  Heart Rate during session    SPO2 at rest %  SPO2 during session %     Patient education  Patient educated on role of Physical Therapy, risks of immobility, safety and plan of care, energy conservation,  importance of mobility while in hospital  and safety      Patient response to education:   Pt verbalized understanding Pt demonstrated skill Pt requires further education in this area   Yes Partial Yes      Treatment:  Patient practiced and was instructed/facilitated in the following treatment: Patient stood from bedside to amb in hallway, seated rest and amb back to room. Patient informed he is going for heart cath today. Therapeutic Exercises:  not performed  reps. At end of session, patient in bed with   call light and phone within reach,  all lines and tubes intact, nursing notified. Patient would benefit from continued skilled Physical Therapy to improve functional independence and quality of life.          Patient's/ family goals   home    Time in  910  Time out 921    Total Treatment Time  11 minutes    CPT codes:  Gait Training (95605) 11 minutes 1 unit(s)    Anil Horne, \[facility] [place] [facility]\""   #[id number]

## 2022-07-13 NOTE — CARE COORDINATION
7/13/2022 0944 CM note: Negative covid 7/10/22. Per IDR, pt to be transferred to [facility] for cardiac catheterization today. Pt given Community resources and to follow up with his [facility]  and APS after discharge. Pt relays his niece Brenda Jerome and his sister [place] live nearby and can assist him as needed and can provide ride home at d/c.  Tristan LEACH    ADDENDUM: Filled prescribed medications delivered to patient by Meds to Bed program Tristan LEACH

## 2022-07-13 NOTE — PROGRESS NOTES
Talked to April, NP about patient requesting to have something ordered to help with sleep. Patient usually takes 10 mg ambien. 3mg melatonin was ordered, Patient expressed melatonin does not work. Called back and left a message.      Awaiting orders

## 2022-07-13 NOTE — PROGRESS NOTES
CLINICAL PHARMACY NOTE: MEDS TO BEDS    Total # of Prescriptions Filled: 12   The following medications were delivered to the patient:  · Dulera 200-5 inhaler   · Lorazepam 0.5 mg   · Miralax packets   · Zoloft 25 mg   · Prednisone 20 mg   · Omeprazole 20 mg   · Nicotine 21 mg patches   · Ventolin inhaler   · Pravastatin 40 mg   · Aspirin 81 mg   · Buspar 15 mg  · Norco 5/325 mg         Additional Documentation:

## 2022-07-13 NOTE — DISCHARGE SUMMARY
[facility] Physician Discharge Summary       Jerrica Alonzo MD  [address] [facility] [address] [id number] [phone]    On 7/20/2022  PCP and follow up appt at [facility] with Dr. Felicia Lantigua on Wed July 20th at 11:30 AM. Please bring all medications and insurance card, also wear a mask. Outpatient Psych    Schedule an appointment as soon as possible for a visit in 1 week        Activity level: Per cardiology    Diet: Diet NPO    Labs:Per PCP    Condition at discharge: Mr. Kavon Arvizu is being transferred to [facility] Cath Lab    Dispo:Transfer to [facility] Cath [facility]        Patient ID:  Jayshree Villalta  [id number]  58 y.o.  1960    Admit date: 7/10/2022    Discharge date and time:  7/13/2022  11:04 AM    Admission Diagnoses: Principal Problem:    COPD exacerbation ([address]  Active Problems:    Adjustment disorder with mixed anxiety and depressed mood    Essential hypertension    Type 2 diabetes mellitus with hyperglycemia, without long-term current use of insulin ([address]    Hyperlipidemia  Resolved Problems:    * No resolved hospital problems. *      Discharge Diagnoses: Principal Problem:    COPD exacerbation ([address]  Active Problems:    Adjustment disorder with mixed anxiety and depressed mood    Essential hypertension    Type 2 diabetes mellitus with hyperglycemia, without long-term current use of insulin ([address]    Hyperlipidemia  Resolved Problems:    * No resolved hospital problems. *      Consults:  IP CONSULT TO CARDIOLOGY  IP CONSULT TO PSYCHIATRY    Procedures: Shayne Blackwood nuclear stress test with imaging    Hospital Course: Leilani Hendricks is a 65yo male with hx of  COPD, HTN, and DM who presented to ER via EMS on 7/10 for lower left sided chest pain for the last 3-4 weeks and shortness of breath. He had not been taking his medications, or been using home oxygen for quite some time due to financial and insurance problems.   He became newly prescribed zoloft and buspar. 30 day supply of zoloft and buspar were prescribed for pt. 3 days supply of ativan was also prescribed. Mr. Leif Cabrera was again advised to follow-up with surgeon regarding adrenal nodule. At this time Mr. Leif Cabrera is ready for transfer to Cath Lab at [facility].           Discharge Exam:  Vitals:    07/12/22 1245 07/12/22 1351 07/12/22 2024 07/13/22 0730   BP: (!) 175/90 (!) 158/92 (!) 159/81 (!) 166/79   Pulse: 78 92 86 82   Resp: 20 16 19   Temp: 97.1 °F (36.2 °C)  98.3 °F (36.8 °C) 98 °F (36.7 °C)   TempSrc: Infrared  Oral Infrared   SpO2: 96%  96% 97%   Weight:       Height:             PHYSICAL EXAM:  General Appearance: alert and oriented to person, place and time and in no acute distress  Skin: warm and dry  Head: normocephalic and atraumatic  Eyes: pupils equal, round, and reactive to light, extraocular eye movements intact, conjunctivae normal  Neck: neck supple and non tender without mass   Pulmonary/Chest: Lung sounds clear. No wheezing, rales or rhonchi, diminished air movement, no respiratory distress  Cardiovascular: normal rate, normal S1 and S2 and no carotid bruits  Abdomen: soft, non-tender, non-distended, normal bowel sounds, no masses or organomegaly  Extremities: no cyanosis, no clubbing and no edema  Neurologic: no cranial nerve deficit and speech normal       I/O last 3 completed shifts: In: 450 [P.O.:450]  Out: 600 [Urine:600]  No intake/output data recorded.       LABS:  Recent Labs     07/11/22  0623 07/12/22  0638 07/13/22  0719    137 136   K 4.7 4.2 4.3   CL 99 101 99   CO2 23 27 28   BUN 20 21 23   CREATININE 1.0 0.9 1.1   GLUCOSE 189* 132* 128*   CALCIUM 9.1 9.4 9.0       Recent Labs     07/11/22  0624 07/12/22  0638 07/13/22  0719   WBC 9.6 11.1 10.1   RBC 4.45 4.62 4.40   HGB 15.2 15.8 14.9   HCT 44.8 46.9 45.4   .7* 101.5* 103.2*   MCH 34.2 34.2 33.9   MCHC 33.9 33.7 32.8   RDW 12.7 13.1 12.9    134 124*   MPV 11.3 11.2 10.9       No results for input(s): POCGLU in the last 72 hours. Imaging:  No results found. Patient Instructions:   Current Discharge Medication List      START taking these medications    Details   HYDROcodone-acetaminophen (NORCO) 5-325 MG per tablet Take 1 tablet by mouth every 6 hours as needed for Pain for up to 3 days. Qty: 12 tablet, Refills: 0    Comments: Reduce doses taken as pain becomes manageable  Associated Diagnoses: Chest pain, unspecified type      aspirin 81 MG EC tablet Take 1 tablet by mouth daily  Qty: 30 tablet, Refills: 3      busPIRone (BUSPAR) 15 MG tablet Take 15 mg by mouth 2 times daily  Qty: 60 tablet, Refills: 0      LORazepam (ATIVAN) 0.5 MG tablet Take 1 tablet by mouth 2 times daily as needed for Anxiety for up to 3 days.   Qty: 6 tablet, Refills: 0    Associated Diagnoses: Anxiety      sertraline (ZOLOFT) 25 MG tablet Take 1 tablet by mouth daily  Qty: 30 tablet, Refills: 0      polyethylene glycol (GLYCOLAX) 17 g packet Take 17 g by mouth daily as needed for Constipation  Qty: 10 each, Refills: 0      mometasone-formoterol (DULERA) 200-5 MCG/ACT inhaler Inhale 2 puffs into the lungs every 12 hours  Qty: 1 each, Refills: 0      albuterol sulfate HFA (PROAIR HFA) 108 (90 Base) MCG/ACT inhaler Inhale 2 puffs into the lungs every 6 hours as needed for Wheezing  Qty: 18 g, Refills: 0      predniSONE (DELTASONE) 20 MG tablet Take 2 tablets by mouth daily for 3 days  Qty: 6 tablet, Refills: 0         CONTINUE these medications which have CHANGED    Details   omeprazole (PRILOSEC) 40 MG delayed release capsule Take 1 capsule by mouth daily as needed (Reflux)  Qty: 30 capsule, Refills: 0      pravastatin (PRAVACHOL) 40 MG tablet Take 1 tablet by mouth daily New higher dose  Qty: 30 tablet, Refills: 0      metoprolol succinate (TOPROL XL) 25 MG extended release tablet Take 1 tablet by mouth 2 times daily New lower dose  Qty: 60 tablet, Refills: 0      nicotine (NICODERM CQ) 21 MG/24HR Place 1 patch onto the skin daily  Qty: 30 patch, Refills: 0         STOP taking these medications       ibuprofen (ADVIL;MOTRIN) 200 MG tablet Comments:   Reason for Stopping:         folic acid (FOLVITE) 1 MG tablet Comments:   Reason for Stopping:                 Note that more than 30 minutes was spent in preparing discharge papers, discussing discharge with patient, medication review, etc.    NOTE: This report was transcribed using voice recognition software. Every effort was made to ensure accuracy; however, inadvertent computerized transcription errors may be present.      Signed:  Electronically signed by JOSSIE Nascimento NP on 7/13/2022 at 11:04 AM

## 2022-07-14 VITALS
DIASTOLIC BLOOD PRESSURE: 90 MMHG | SYSTOLIC BLOOD PRESSURE: 148 MMHG | HEART RATE: 89 BPM | WEIGHT: 260 LBS | TEMPERATURE: 97.3 F | RESPIRATION RATE: 16 BRPM | HEIGHT: 71 IN | BODY MASS INDEX: 36.4 KG/M2 | OXYGEN SATURATION: 95 %

## 2022-07-14 LAB
ANION GAP SERPL CALCULATED.3IONS-SCNC: 11 MMOL/L (ref 7–16)
BASOPHILS ABSOLUTE: 0.04 E9/L (ref 0–0.2)
BASOPHILS RELATIVE PERCENT: 0.4 % (ref 0–2)
BUN BLDV-MCNC: 19 MG/DL (ref 6–23)
CALCIUM SERPL-MCNC: 8 MG/DL (ref 8.6–10.2)
CHLORIDE BLD-SCNC: 104 MMOL/L (ref 98–107)
CO2: 23 MMOL/L (ref 22–29)
CREAT SERPL-MCNC: 0.9 MG/DL (ref 0.7–1.2)
EOSINOPHILS ABSOLUTE: 0.13 E9/L (ref 0.05–0.5)
EOSINOPHILS RELATIVE PERCENT: 1.4 % (ref 0–6)
GFR AFRICAN AMERICAN: >60
GFR NON-AFRICAN AMERICAN: >60 ML/MIN/1.73
GLUCOSE BLD-MCNC: 109 MG/DL (ref 74–99)
HCT VFR BLD CALC: 41.1 % (ref 37–54)
HEMOGLOBIN: 13.6 G/DL (ref 12.5–16.5)
IMMATURE GRANULOCYTES #: 0.05 E9/L
IMMATURE GRANULOCYTES %: 0.5 % (ref 0–5)
LYMPHOCYTES ABSOLUTE: 3.23 E9/L (ref 1.5–4)
LYMPHOCYTES RELATIVE PERCENT: 35 % (ref 20–42)
MCH RBC QN AUTO: 33.5 PG (ref 26–35)
MCHC RBC AUTO-ENTMCNC: 33.1 % (ref 32–34.5)
MCV RBC AUTO: 101.2 FL (ref 80–99.9)
METER GLUCOSE: 112 MG/DL (ref 74–99)
METER GLUCOSE: 117 MG/DL (ref 74–99)
MONOCYTES ABSOLUTE: 0.69 E9/L (ref 0.1–0.95)
MONOCYTES RELATIVE PERCENT: 7.5 % (ref 2–12)
NEUTROPHILS ABSOLUTE: 5.08 E9/L (ref 1.8–7.3)
NEUTROPHILS RELATIVE PERCENT: 55.2 % (ref 43–80)
PDW BLD-RTO: 13.1 FL (ref 11.5–15)
PLATELET # BLD: 119 E9/L (ref 130–450)
PMV BLD AUTO: 10.9 FL (ref 7–12)
POTASSIUM SERPL-SCNC: 3.8 MMOL/L (ref 3.5–5)
RBC # BLD: 4.06 E12/L (ref 3.8–5.8)
SODIUM BLD-SCNC: 138 MMOL/L (ref 132–146)
WBC # BLD: 9.2 E9/L (ref 4.5–11.5)

## 2022-07-14 PROCEDURE — 97165 OT EVAL LOW COMPLEX 30 MIN: CPT

## 2022-07-14 PROCEDURE — G0378 HOSPITAL OBSERVATION PER HR: HCPCS

## 2022-07-14 PROCEDURE — 80048 BASIC METABOLIC PNL TOTAL CA: CPT

## 2022-07-14 PROCEDURE — 36415 COLL VENOUS BLD VENIPUNCTURE: CPT

## 2022-07-14 PROCEDURE — 85025 COMPLETE CBC W/AUTO DIFF WBC: CPT

## 2022-07-14 PROCEDURE — 94640 AIRWAY INHALATION TREATMENT: CPT

## 2022-07-14 PROCEDURE — 82962 GLUCOSE BLOOD TEST: CPT

## 2022-07-14 PROCEDURE — 6370000000 HC RX 637 (ALT 250 FOR IP): Performed by: NURSE PRACTITIONER

## 2022-07-14 PROCEDURE — 6360000002 HC RX W HCPCS: Performed by: NURSE PRACTITIONER

## 2022-07-14 PROCEDURE — 6360000002 HC RX W HCPCS

## 2022-07-14 RX ORDER — METOPROLOL SUCCINATE 25 MG/1
25 TABLET, EXTENDED RELEASE ORAL DAILY
Qty: 90 TABLET | Refills: 1 | Status: SHIPPED | OUTPATIENT
Start: 2022-07-14 | End: 2022-08-01 | Stop reason: SDUPTHER

## 2022-07-14 RX ORDER — PRAVASTATIN SODIUM 40 MG
40 TABLET ORAL NIGHTLY
Qty: 30 TABLET | Refills: 3 | Status: SHIPPED | OUTPATIENT
Start: 2022-07-14 | End: 2022-08-01 | Stop reason: ALTCHOICE

## 2022-07-14 RX ORDER — METOPROLOL SUCCINATE 25 MG/1
25 TABLET, EXTENDED RELEASE ORAL DAILY
Qty: 30 TABLET | Refills: 0
Start: 2022-07-14 | End: 2022-07-14 | Stop reason: HOSPADM

## 2022-07-14 RX ORDER — PRAVASTATIN SODIUM 40 MG
40 TABLET ORAL NIGHTLY
Qty: 30 TABLET | Refills: 0
Start: 2022-07-14 | End: 2022-08-01 | Stop reason: ALTCHOICE

## 2022-07-14 RX ADMIN — BUSPIRONE HYDROCHLORIDE 15 MG: 5 TABLET ORAL at 08:47

## 2022-07-14 RX ADMIN — ARFORMOTEROL TARTRATE 15 MCG: 15 SOLUTION RESPIRATORY (INHALATION) at 11:07

## 2022-07-14 RX ADMIN — ASPIRIN 81 MG: 81 TABLET, COATED ORAL at 08:47

## 2022-07-14 RX ADMIN — SERTRALINE 25 MG: 50 TABLET, FILM COATED ORAL at 08:47

## 2022-07-14 RX ADMIN — BUDESONIDE 500 MCG: 0.5 SUSPENSION RESPIRATORY (INHALATION) at 11:08

## 2022-07-14 RX ADMIN — METOPROLOL SUCCINATE 25 MG: 25 TABLET, EXTENDED RELEASE ORAL at 08:46

## 2022-07-14 RX ADMIN — PANTOPRAZOLE SODIUM 40 MG: 40 TABLET, DELAYED RELEASE ORAL at 08:46
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
## 2022-07-14 NOTE — PATIENT CARE CONFERENCE
P Quality Flow/Interdisciplinary Rounds Progress Note        Quality Flow Rounds held on July 14, 2022    Disciplines Attending:  Bedside Nurse, ,  and Nursing Unit Leadership    Karen Reyna was admitted on 7/13/2022  7:06 PM    Anticipated Discharge Date:  Expected Discharge Date: 07/16/22    Disposition:    Nikko Score:  Nikko Scale Score: 20    Readmission Risk              Risk of Unplanned Readmission:  13           Discussed patient goal for the day, patient clinical progression, and barriers to discharge.   The following Goal(s) of the Day/Commitment(s) have been identified: discharge planning       Nicholas Englandr, RN  July 14, 2022

## 2022-07-14 NOTE — PROGRESS NOTES
Talked to ART Pena about why patient is taking prednisone. Per patient he is taking it for his COPD, and he was told to start taking it once he leaves the hospital. Reported what the patient told me back to April.

## 2022-07-14 NOTE — PROGRESS NOTES
Patient Discharged with all belongings. Patient insisted on ambulating off unit to POV for discharge; patient walked safely and steadily to the elevator.  Electronically signed by Coral Starkey RN on 7/14/22 at 6:18 PM EDT

## 2022-07-14 NOTE — PROGRESS NOTES
Occupational Therapy    Occupational Therapy  OCCUPATIONAL THERAPY INITIAL EVALUATION    [address]  [address] [place] [address]      Date:2022  Patient Name: Lizzette Lehman  MRN: [id number]  : 1960  Room: [address] [place] [address]    Evaluating OT: Sarkis Chao OTR/L #[id number]     Referring Provider: JOSSIE Haider CNP  Specific Provider Orders/Date: OT eval and treat 22    AM PAC:   G-code:  CH  Recommended Adaptive Equipment: none    Diagnosis: CAD in native artery [I25.10]     Surgery: L heart cath 22     Past Medical History:   Past Medical History:   Diagnosis Date    CAD in native artery 2022    Chronic obstructive pulmonary disease [facility]     Diabetes mellitus ([facility]     H/O cardiovascular stress test 2022    Lexiscan    Hyperlipidemia     Hypertension         Home Living: Pt lives alone in a 1 story home with 1 JACKIE and no hand rail. Bathroom setup: tub/shower combo  Equipment owned: none    Prior Level of Function: Independent with ADLs; Independent with IADLs; ambulated without AD  Driving: yes  Occupation: na    Pain Level: pt reports B LE neuropathy pain ; nursing aware    Cognition: oriented x 4  Additional Comments: Pleasant and cooperative.   Followed commands     Sensory:   Hearing: wfl  Vision: wfl    Glasses: yes [] no [] reading []      UE Assessment:  Hand Dominance: Right []  Left []     Strength ROM Additional Info:    RUE  5/5 wfl good  and wfl FMC/dexterity noted during ADL tasks     LUE 5/5 wfl good  and wfl FMC/dexterity noted during ADL tasks   Sensation: B LE neuropathy   Tone: wfl  Edema: none noted    Functional Assessment:   Current Status  Comments   Feeding  indep    Grooming  indep    Upper Body Dressing indep     Lower Body Dressing indep    Bathing indep    Toileting  indep    Bed Mobility  NT    Functional Transfers indep    Functional Mobility indep      Sit balance: indep  Stand balance: indep  Endurance/Activity tolerance: G                              Comments: Upon arrival pt agreeable to OT Session. At end of session pt seated with all devices within reach. Assessment of current deficits   Functional mobility []  ROM [] Strength []  Cognition []  ADLs []   IADLs [] Safety Awareness [] Endurance []  Fine Motor Coordination [] Balance [] Vision/perception [] Sensation []   Gross Motor Coordination []     Eval Complexity: low    (Evaluation time includes thorough review of current medical information, gathering information on past medical history/social history and prior level of function, completion of standardized testing/informal observation of tasks, assessment of data, and development of POC/Goals.)    Treatment frequency: evaluation only    Plan of Care:  ADL retraining []   Equipment needs []   Neuromuscular re-education [] Energy Conservation Techniques []  Functional Transfer training [] Patient and/or Family Education []  Functional Mobility training []  Environmental Modifications []  Cognitive re-training []   Compensatory techniques for ADLs []  Splinting Needs []   Positioning to improve overall function []  Other: []      Rehab Potential: Good    Patient / Family Goal: Return home    Short term goals  Time Frame: Evaluation only - Skilled OT services not indicated    Patient and/or family understands diagnosis, prognosis and plan of care: yes    [] Malnutrition indicators have been identified and nursing has been notified to ensure a dietitian consult is ordered.      Time in: 745  Time out: 800  Total Time: 15 minutes    Mani Lin OTR/L #[id number]

## 2022-07-14 NOTE — H&P
[facility]  History and Physical      CHIEF COMPLAINT:    No chief complaint on file. Patient of No primary care provider on file. presents with:  CAD in native artery    History of Present Illness:   Patient is a 79-year-old male with a past medical history of CAD, DM, HLD, HTN who initially presented to [facility] for chest pain. Patient was worked up and found to have a positive stress test and was transferred to [place] [id number] for a cardiac catheterization. Labs have been relatively unremarkable on this admission. Left heart cath revealed chronic total occlusion of the right coronary artery with evidence of left-to-right collaterals and 60% discrete proximal OM1 stenosis. Patient currently denies any CP, SOB, abdominal pain, fever, chills, N/V/D. Patient is admitted to intermediate telemetry unit for further work-up and treatment        REVIEW OF SYSTEMS:  Pertinent negatives are above in HPI. 10 point ROS otherwise negative. Past Medical History:   Diagnosis Date    CAD in native artery 7/13/2022    Chronic obstructive pulmonary disease ([facility] [address]     Diabetes mellitus ([facility] [address]     H/O cardiovascular stress test 07/12/2022    Lexiscan    Hyperlipidemia     Hypertension          Past Surgical History:   Procedure Laterality Date    APPENDECTOMY      CARDIOVASCULAR STRESS TEST N/A 09/03/2021    Lexiscan stress test    COLONOSCOPY      LUNG SURGERY      from scar tissue    NECK SURGERY N/A 02/05/2020    EXCISION POSTERIOR SOFT TISSUE NEOPLASM NECK (CPT 01814) performed by Galina Corley MD at [address]       Medications Prior to Admission:    Medications Prior to Admission: zolpidem (AMBIEN) 10 MG tablet, Take 10 mg by mouth nightly as needed for Sleep. HYDROcodone-acetaminophen (NORCO) 5-325 MG per tablet, Take 1 tablet by mouth every 6 hours as needed for Pain for up to 3 days.   aspirin 81 MG EC tablet, Take 1 tablet by mouth daily  busPIRone (BUSPAR) 15 MG tablet, Take lymphadenopathy  Lungs: Clear to auscultation, WOB normal  CV: RRR, no MRGs, no lower extremity edema  Abdomen: Soft, non-tender; no masses or HSM, +BS  Extremities: FROM without synovitis. No clubbing or cyanosis of the hands. Skin: no rash, induration, lesions, or ulcers  Psych: Calm and cooperative. Normal judgement and insight. Normal mood and affect. Neuro: Alert and interactive, face symmetric, speech fluent. LABS:  All labs reviewed. Of note:  CBC:   Lab Results   Component Value Date/Time    WBC 9.2 07/14/2022 06:07 AM    RBC 4.06 07/14/2022 06:07 AM    HGB 13.6 07/14/2022 06:07 AM    HCT 41.1 07/14/2022 06:07 AM    .2 07/14/2022 06:07 AM    MCH 33.5 07/14/2022 06:07 AM    MCHC 33.1 07/14/2022 06:07 AM    RDW 13.1 07/14/2022 06:07 AM     07/14/2022 06:07 AM    MPV 10.9 07/14/2022 06:07 AM     BMP:    Lab Results   Component Value Date/Time     07/14/2022 06:07 AM    K 3.8 07/14/2022 06:07 AM    K 4.3 07/13/2022 07:19 AM     07/14/2022 06:07 AM    CO2 23 07/14/2022 06:07 AM    BUN 19 07/14/2022 06:07 AM    LABALBU 4.1 07/13/2022 07:19 AM    CREATININE 0.9 07/14/2022 06:07 AM    CALCIUM 8.0 07/14/2022 06:07 AM    GFRAA >60 07/14/2022 06:07 AM    LABGLOM >60 07/14/2022 06:07 AM    GLUCOSE 109 07/14/2022 06:07 AM       Imaging:  No imaging    EKG:  Sinus tachycardia with PACs  Inferior infarct, T wave abnormality consider lateral ischemia    Telemetry:  Normal sinus rhythm with PACs    ASSESSMENT/PLAN:  Principal Problem:    CAD in native artery  Resolved Problems:    * No resolved hospital problems. *    Patient is a 26-year-old male admitted to [facility] for  CAD in the native artery  -Transfer from [address] [place] [address] for positive stress  -[facility] 7/13 > chronic total occlusion of the right coronary artery with evidence of left-to-right collaterals. 60% discrete proximal OM1 stenosis.    -Continue ASA, BB, statin  -Follow-up with cardiology outpatient  -Check lipid and A1c    Depression/anxiety  -Continue home medications     Medication for other comorbidities continue as appropriate dose adjustment as necessary. DVT prophylaxis PCD's  PT OT  Discharge planning patient can be discharged home if okay with cardiology today    Case discussed with attending and agreed upon plan of care. Code status: Full  Requires Inpatient level of care  JOSSIE Allen CNP    2:35 PM  7/14/2022    Above note edited to reflect my thoughts     I personally saw, examined and provided care for the patient. Radiographs, labs and medication list were reviewed by me independently. The case was discussed in detail and plans for care were established. Review of CATARINA Allen   , documentation was conducted and revisions were made as appropriate directly by me. I agree with the above documented exam, problem list, and plan of care.      Nando Cavazos MD  7/14/2022

## 2022-07-14 NOTE — PROCEDURES
[address]                  [address] [place],  [facility]                            CARDIAC CATHETERIZATION    PATIENT NAME: Raimundo Link                   :        1960  MED REC NO:   [id number]                            ROOM:  ACCOUNT NO:   [de-identified]                           ADMIT DATE: 2022  PROVIDER:     Kay Morales MD    DATE OF PROCEDURE:  2022    LEFT HEART CATHETERIZATION    INDICATION FOR PROCEDURE:  Abnormal stress test.    REFERRING PHYSICIAN:  Dr. Pily Titus. PROCEDURE NOTE:  The patient was transferred from [facility]  to undergo left heart catheterization. After obtaining a signed consent  from the patient, he was brought to the cardiac cath lab in his usual  fasting state. Under sterile condition and under local anesthetic and  using conscious sedation, a 6-Cymro Terumo Slender sheath was inserted  into his right radial artery. The patient received 5000 units of  intravenous heparin. He also received 200 mcg of intraarterial  nitroglycerin and 2.5 mg of diluted verapamil via the right radial  sheath. Then, a 5-Cymro TIG diagnostic catheter was advanced to the  ascending aorta without difficulty. The left main artery was engaged  and a coronary angiogram was done. This catheter failed to engage the  right coronary artery. It was exchanged over a guidewire to a 5-Cymro  JR4 diagnostic catheter which was able to engage the right coronary  artery and an angiogram was done. No left ventriculogram was done due  to known ejection fraction by recent [facility]. At the end of the  procedure, the diagnostic catheter was pulled out. The Terumo Slender  sheath was pulled out and a Vasc Band was applied over the right radial  artery with good hemostasis. The patient tolerated the procedure very  well and no complications were noted.   No significant blood loss  occurred during the procedure. FINDINGS:  HEMODYNAMICS:  Aortic pressure 125/99 mmHg. CORONARY ANATOMY:  1. Left main:  It is a large artery with no angiographic stenosis  noted. 2.  LAD:  It is a large artery wrapping around the apex and giving rise  to two diagonal branches and several septal perforators. The LAD was  calcified in its proximal to mid segment. No significant angiographic  stenosis was noted in the LAD or its branches. 3.  Left circumflex: It is a large artery giving rise to a large  bifurcating obtuse marginal branch, then continues to AV groove. There  was 60% discrete proximal OM1 stenosis. There was evidence of  collaterals from the AV groove branch into the distal RCA branches. 4.  RCA:  It has an anomalous takeoff and taking off anteriorly higher than  the left main. It gives rise to a conus branch and an RV marginal  branch. The RCA was chronically totally occluded at mid segment. IMPRESSION:  1. 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 2.  60% discrete proximal OM1 stenosis. RECOMMENDATIONS:  Aggressive medical therapy. PROCEDURE START TIME:  1747 p.m. PROCEDURE END TIME:  1808 p.m. CONTRAST VOLUME:  110 mL of Isovue. FLUOROSCOPY TIME:  4.7 minutes. CONSCIOUS SEDATION:  2 mg of intravenous Versed and 100 mcg of  intravenous fentanyl.         Angela Cintron MD    D: 07/13/2022 18:22:18       T: 07/13/2022 18:24:41     [id number]  Job#: [id number]     Doc#: [id number]    CC:

## 2022-07-14 NOTE — PROGRESS NOTES
CLINICAL PHARMACY NOTE: MEDS TO BEDS    Total # of Prescriptions Filled: 2   The following medications were delivered to the patient:  · Metoprolol succinate 25 mg  · Picked up in the pharmacy    Additional Documentation:

## 2022-07-14 NOTE — PLAN OF CARE
Problem: Chronic Conditions and Co-morbidities  Goal: Patient's chronic conditions and co-morbidity symptoms are monitored and maintained or improved  7/14/2022 1128 by Radhames Disla RN  Outcome: Progressing  Flowsheets (Taken 7/14/2022 0850)  Care Plan - Patient's Chronic Conditions and Co-Morbidity Symptoms are Monitored and Maintained or Improved: Monitor and assess patient's chronic conditions and comorbid symptoms for stability, deterioration, or improvement  7/14/2022 0016 by Tristen Barry RN  Outcome: Progressing     Problem: Discharge Planning  Goal: Discharge to home or other facility with appropriate resources  7/14/2022 1128 by Radhames Disla RN  Outcome: Progressing  Flowsheets (Taken 7/14/2022 0850)  Discharge to home or other facility with appropriate resources: Identify barriers to discharge with patient and caregiver  7/14/2022 0016 by Tristen Barry RN  Outcome: Progressing     Problem: Safety - Adult  Goal: Free from fall injury  7/14/2022 1128 by Radhames Disla RN  Outcome: Progressing  Flowsheets (Taken 7/14/2022 0850)  Free From Fall Injury: Instruct family/caregiver on patient safety  7/14/2022 0016 by Tristen Barry RN  Outcome: Progressing

## 2022-07-14 NOTE — PROGRESS NOTES
TR band removed, No redness, drainage or swelling at the site. Transparent dressing and arm board applied. Educated patient on arm board, not bending his wrist and dressing.

## 2022-08-01 ENCOUNTER — OFFICE VISIT (OUTPATIENT)
Dept: FAMILY MEDICINE CLINIC | Age: 62
End: 2022-08-01
Payer: COMMERCIAL

## 2022-08-01 VITALS
RESPIRATION RATE: 16 BRPM | DIASTOLIC BLOOD PRESSURE: 80 MMHG | BODY MASS INDEX: 34.3 KG/M2 | SYSTOLIC BLOOD PRESSURE: 122 MMHG | HEART RATE: 72 BPM | TEMPERATURE: 98.2 F | WEIGHT: 245 LBS | HEIGHT: 71 IN | OXYGEN SATURATION: 98 %

## 2022-08-01 DIAGNOSIS — E78.00 PURE HYPERCHOLESTEROLEMIA: ICD-10-CM

## 2022-08-01 DIAGNOSIS — F51.04 PSYCHOPHYSIOLOGICAL INSOMNIA: ICD-10-CM

## 2022-08-01 DIAGNOSIS — J44.9 CHRONIC OBSTRUCTIVE PULMONARY DISEASE, UNSPECIFIED COPD TYPE (HCC): ICD-10-CM

## 2022-08-01 DIAGNOSIS — Z13.9 ENCOUNTER FOR SCREENING INVOLVING SOCIAL DETERMINANTS OF HEALTH (SDOH): ICD-10-CM

## 2022-08-01 DIAGNOSIS — F41.8 DEPRESSION WITH ANXIETY: ICD-10-CM

## 2022-08-01 DIAGNOSIS — I25.10 CAD IN NATIVE ARTERY: ICD-10-CM

## 2022-08-01 DIAGNOSIS — I10 ESSENTIAL HYPERTENSION: ICD-10-CM

## 2022-08-01 DIAGNOSIS — F17.200 TOBACCO DEPENDENCY: ICD-10-CM

## 2022-08-01 DIAGNOSIS — Z09 HOSPITAL DISCHARGE FOLLOW-UP: Primary | ICD-10-CM

## 2022-08-01 PROCEDURE — 1111F DSCHRG MED/CURRENT MED MERGE: CPT | Performed by: FAMILY MEDICINE

## 2022-08-01 PROCEDURE — 4004F PT TOBACCO SCREEN RCVD TLK: CPT | Performed by: FAMILY MEDICINE

## 2022-08-01 PROCEDURE — 3023F SPIROM DOC REV: CPT | Performed by: FAMILY MEDICINE

## 2022-08-01 PROCEDURE — G8417 CALC BMI ABV UP PARAM F/U: HCPCS | Performed by: FAMILY MEDICINE

## 2022-08-01 PROCEDURE — G8427 DOCREV CUR MEDS BY ELIG CLIN: HCPCS | Performed by: FAMILY MEDICINE

## 2022-08-01 PROCEDURE — 99205 OFFICE O/P NEW HI 60 MIN: CPT | Performed by: FAMILY MEDICINE

## 2022-08-01 PROCEDURE — 3017F COLORECTAL CA SCREEN DOC REV: CPT | Performed by: FAMILY MEDICINE

## 2022-08-01 RX ORDER — ASPIRIN 81 MG/1
81 TABLET ORAL DAILY
Qty: 30 TABLET | Refills: 11 | Status: SHIPPED | OUTPATIENT
Start: 2022-08-01 | End: 2023-08-01

## 2022-08-01 RX ORDER — OMEPRAZOLE 40 MG/1
40 CAPSULE, DELAYED RELEASE ORAL DAILY PRN
Qty: 30 CAPSULE | Refills: 11 | Status: SHIPPED | OUTPATIENT
Start: 2022-08-01 | End: 2023-08-01

## 2022-08-01 RX ORDER — ALBUTEROL SULFATE 90 UG/1
2 AEROSOL, METERED RESPIRATORY (INHALATION) EVERY 6 HOURS PRN
Qty: 1 EACH | Refills: 11 | Status: SHIPPED | OUTPATIENT
Start: 2022-08-01 | End: 2023-08-01

## 2022-08-01 RX ORDER — METOPROLOL SUCCINATE 25 MG/1
25 TABLET, EXTENDED RELEASE ORAL DAILY
Qty: 30 TABLET | Refills: 11 | Status: SHIPPED
Start: 2022-08-01 | End: 2022-08-29 | Stop reason: SDUPTHER

## 2022-08-01 RX ORDER — QUETIAPINE FUMARATE 25 MG/1
50 TABLET, FILM COATED ORAL NIGHTLY
Qty: 60 TABLET | Refills: 11 | Status: SHIPPED | OUTPATIENT
Start: 2022-08-01 | End: 2023-08-01

## 2022-08-01 RX ORDER — NICOTINE 21 MG/24HR
1 PATCH, TRANSDERMAL 24 HOURS TRANSDERMAL EVERY 24 HOURS
Qty: 30 PATCH | Refills: 0 | Status: SHIPPED | OUTPATIENT
Start: 2022-08-12 | End: 2022-09-11

## 2022-08-01 RX ORDER — DILTIAZEM HYDROCHLORIDE 60 MG/1
2 TABLET, FILM COATED ORAL 2 TIMES DAILY
Qty: 1 EACH | Refills: 11 | Status: SHIPPED | OUTPATIENT
Start: 2022-08-01 | End: 2023-08-01

## 2022-08-01 RX ORDER — ATORVASTATIN CALCIUM 40 MG/1
40 TABLET, FILM COATED ORAL NIGHTLY
Qty: 30 TABLET | Refills: 11 | Status: SHIPPED | OUTPATIENT
Start: 2022-08-01 | End: 2023-08-01

## 2022-08-01 SDOH — ECONOMIC STABILITY: FOOD INSECURITY: WITHIN THE PAST 12 MONTHS, YOU WORRIED THAT YOUR FOOD WOULD RUN OUT BEFORE YOU GOT MONEY TO BUY MORE.: OFTEN TRUE

## 2022-08-01 SDOH — ECONOMIC STABILITY: FOOD INSECURITY: WITHIN THE PAST 12 MONTHS, THE FOOD YOU BOUGHT JUST DIDN'T LAST AND YOU DIDN'T HAVE MONEY TO GET MORE.: OFTEN TRUE

## 2022-08-01 ASSESSMENT — ENCOUNTER SYMPTOMS
ABDOMINAL PAIN: 0
BACK PAIN: 0
SINUS PAIN: 1
SORE THROAT: 0
NAUSEA: 0
WHEEZING: 0
COUGH: 0
DIARRHEA: 0
CONSTIPATION: 0
VOMITING: 0
BLOOD IN STOOL: 0
SINUS PRESSURE: 1
SHORTNESS OF BREATH: 1

## 2022-08-01 ASSESSMENT — PATIENT HEALTH QUESTIONNAIRE - PHQ9
9. THOUGHTS THAT YOU WOULD BE BETTER OFF DEAD, OR OF HURTING YOURSELF: 1
4. FEELING TIRED OR HAVING LITTLE ENERGY: 3
7. TROUBLE CONCENTRATING ON THINGS, SUCH AS READING THE NEWSPAPER OR WATCHING TELEVISION: 3
3. TROUBLE FALLING OR STAYING ASLEEP: 3
8. MOVING OR SPEAKING SO SLOWLY THAT OTHER PEOPLE COULD HAVE NOTICED. OR THE OPPOSITE, BEING SO FIGETY OR RESTLESS THAT YOU HAVE BEEN MOVING AROUND A LOT MORE THAN USUAL: 0
10. IF YOU CHECKED OFF ANY PROBLEMS, HOW DIFFICULT HAVE THESE PROBLEMS MADE IT FOR YOU TO DO YOUR WORK, TAKE CARE OF THINGS AT HOME, OR GET ALONG WITH OTHER PEOPLE: 3
SUM OF ALL RESPONSES TO PHQ QUESTIONS 1-9: 19
SUM OF ALL RESPONSES TO PHQ9 QUESTIONS 1 & 2: 6
6. FEELING BAD ABOUT YOURSELF - OR THAT YOU ARE A FAILURE OR HAVE LET YOURSELF OR YOUR FAMILY DOWN: 3
1. LITTLE INTEREST OR PLEASURE IN DOING THINGS: 3
SUM OF ALL RESPONSES TO PHQ QUESTIONS 1-9: 19
5. POOR APPETITE OR OVEREATING: 0
2. FEELING DOWN, DEPRESSED OR HOPELESS: 3
SUM OF ALL RESPONSES TO PHQ QUESTIONS 1-9: 19
SUM OF ALL RESPONSES TO PHQ QUESTIONS 1-9: 18

## 2022-08-01 ASSESSMENT — LIFESTYLE VARIABLES: HOW OFTEN DO YOU HAVE A DRINK CONTAINING ALCOHOL: NEVER

## 2022-08-01 ASSESSMENT — SOCIAL DETERMINANTS OF HEALTH (SDOH): HOW HARD IS IT FOR YOU TO PAY FOR THE VERY BASICS LIKE FOOD, HOUSING, MEDICAL CARE, AND HEATING?: VERY HARD

## 2022-08-01 NOTE — PROGRESS NOTES
CARDIAC CATH 7/13/12: IMPRESSION:  1. 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 2.  60% discrete proximal OM1 stenosis. RECOMMENDATIONS:  Aggressive medical therapy. ECHOCARDIOGRAM 7/10/22:    Summary   Technically difficult examination. Moderate concentric left ventricular hypertrophy. Left ventricular diastolic filling is elevated . Ejection fraction is visually estimated at 55 to 60%. The left atrium is mildly dilated. Normal right ventricular size and function. Mild mitral regurgitation is present. Mild aortic regurgitation is noted. Individual aortic valve leaflets are not clearly visualized. No hemodynamically significant aortic stenosis is present. Mild tricuspid regurgitation.
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
## 2022-08-02 ENCOUNTER — TELEPHONE (OUTPATIENT)
Dept: FAMILY MEDICINE CLINIC | Age: 62
End: 2022-08-02

## 2022-08-02 PROBLEM — F41.8 DEPRESSION WITH ANXIETY: Status: ACTIVE | Noted: 2022-08-02

## 2022-08-02 PROBLEM — F51.04 PSYCHOPHYSIOLOGICAL INSOMNIA: Status: ACTIVE | Noted: 2022-08-02

## 2022-08-02 PROBLEM — Z13.9 ENCOUNTER FOR SCREENING INVOLVING SOCIAL DETERMINANTS OF HEALTH (SDOH): Status: ACTIVE | Noted: 2022-08-02

## 2022-08-02 NOTE — TELEPHONE ENCOUNTER
LSW initiated phone call to patient regarding referral to social work. Pt did not answer. LSW left a message for pt with contact information. LSW will continue to attempt contact with pt.

## 2022-08-11 ENCOUNTER — TELEPHONE (OUTPATIENT)
Dept: FAMILY MEDICINE CLINIC | Age: 62
End: 2022-08-11
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
## 2022-08-29 ENCOUNTER — OFFICE VISIT (OUTPATIENT)
Dept: FAMILY MEDICINE CLINIC | Age: 62
End: 2022-08-29
Payer: COMMERCIAL

## 2022-08-29 ENCOUNTER — TELEPHONE (OUTPATIENT)
Dept: ADMINISTRATIVE | Age: 62
End: 2022-08-29

## 2022-08-29 VITALS
RESPIRATION RATE: 16 BRPM | OXYGEN SATURATION: 97 % | TEMPERATURE: 97.5 F | SYSTOLIC BLOOD PRESSURE: 110 MMHG | DIASTOLIC BLOOD PRESSURE: 68 MMHG | HEART RATE: 115 BPM | WEIGHT: 245 LBS | HEIGHT: 71 IN | BODY MASS INDEX: 34.3 KG/M2

## 2022-08-29 DIAGNOSIS — I10 ESSENTIAL HYPERTENSION: ICD-10-CM

## 2022-08-29 DIAGNOSIS — F17.200 TOBACCO DEPENDENCY: ICD-10-CM

## 2022-08-29 DIAGNOSIS — R51.9 INTRACTABLE HEADACHE, UNSPECIFIED CHRONICITY PATTERN, UNSPECIFIED HEADACHE TYPE: ICD-10-CM

## 2022-08-29 DIAGNOSIS — J32.9 CHRONIC SINUSITIS, UNSPECIFIED LOCATION: ICD-10-CM

## 2022-08-29 DIAGNOSIS — E78.49 OTHER HYPERLIPIDEMIA: ICD-10-CM

## 2022-08-29 DIAGNOSIS — I25.10 CAD IN NATIVE ARTERY: Primary | ICD-10-CM

## 2022-08-29 DIAGNOSIS — E11.65 TYPE 2 DIABETES MELLITUS WITH HYPERGLYCEMIA, WITHOUT LONG-TERM CURRENT USE OF INSULIN (HCC): ICD-10-CM

## 2022-08-29 DIAGNOSIS — Z12.11 COLON CANCER SCREENING: ICD-10-CM

## 2022-08-29 PROCEDURE — 4004F PT TOBACCO SCREEN RCVD TLK: CPT | Performed by: FAMILY MEDICINE

## 2022-08-29 PROCEDURE — 99215 OFFICE O/P EST HI 40 MIN: CPT | Performed by: FAMILY MEDICINE

## 2022-08-29 PROCEDURE — 2022F DILAT RTA XM EVC RTNOPTHY: CPT | Performed by: FAMILY MEDICINE

## 2022-08-29 PROCEDURE — G8427 DOCREV CUR MEDS BY ELIG CLIN: HCPCS | Performed by: FAMILY MEDICINE

## 2022-08-29 PROCEDURE — G8417 CALC BMI ABV UP PARAM F/U: HCPCS | Performed by: FAMILY MEDICINE

## 2022-08-29 PROCEDURE — 3017F COLORECTAL CA SCREEN DOC REV: CPT | Performed by: FAMILY MEDICINE

## 2022-08-29 PROCEDURE — 3044F HG A1C LEVEL LT 7.0%: CPT | Performed by: FAMILY MEDICINE

## 2022-08-29 RX ORDER — FLUTICASONE PROPIONATE 50 MCG
2 SPRAY, SUSPENSION (ML) NASAL DAILY
Qty: 16 G | Refills: 0 | Status: SHIPPED | OUTPATIENT
Start: 2022-08-29

## 2022-08-29 RX ORDER — ACETAMINOPHEN 500 MG
1000 TABLET ORAL 3 TIMES DAILY
Qty: 180 TABLET | Refills: 0 | Status: SHIPPED | OUTPATIENT
Start: 2022-08-29

## 2022-08-29 RX ORDER — METOPROLOL SUCCINATE 25 MG/1
25 TABLET, EXTENDED RELEASE ORAL DAILY
Qty: 30 TABLET | Refills: 11 | Status: SHIPPED
Start: 2022-08-29 | End: 2022-09-01 | Stop reason: CLARIF

## 2022-08-29 ASSESSMENT — ENCOUNTER SYMPTOMS
BACK PAIN: 0
WHEEZING: 0
SINUS PAIN: 0
SORE THROAT: 0
SINUS PRESSURE: 0
ABDOMINAL PAIN: 0
VOMITING: 0
DIARRHEA: 0
CONSTIPATION: 0
BLOOD IN STOOL: 0
NAUSEA: 0
COUGH: 0
SHORTNESS OF BREATH: 1

## 2022-08-29 NOTE — TELEPHONE ENCOUNTER
He is not 's patient, only hospital consult in 2021 and never followed with office visit so he is a new patient and can see first available,thanks

## 2022-08-29 NOTE — TELEPHONE ENCOUNTER
There is an URGENT referral on Dr. Bladimir Ferrari pt per Dr. Linh Mckenzie dx: CAD; Cardiac cath 7/13/22 with evidence of CAD. Aggressive medical management recommended; currently only on metoprolol and a statin. He remains symptomatic. Last seen in IP consult Dr. Rose Marie Armstrong on: 7/11/22. Scheduling advise please.
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
## 2022-08-30 NOTE — TELEPHONE ENCOUNTER
Dr. Madisyn Ambrocio does not have any opening until November 2022.   Please schedule patient with provider with 1st available opening if this is an urgent referral.

## 2022-09-01 ENCOUNTER — OFFICE VISIT (OUTPATIENT)
Dept: CARDIOLOGY CLINIC | Age: 62
End: 2022-09-01
Payer: COMMERCIAL

## 2022-09-01 VITALS
DIASTOLIC BLOOD PRESSURE: 102 MMHG | HEART RATE: 122 BPM | SYSTOLIC BLOOD PRESSURE: 151 MMHG | RESPIRATION RATE: 14 BRPM | BODY MASS INDEX: 34.58 KG/M2 | HEIGHT: 71 IN | WEIGHT: 247 LBS

## 2022-09-01 DIAGNOSIS — I25.10 CAD IN NATIVE ARTERY: Primary | ICD-10-CM

## 2022-09-01 DIAGNOSIS — I48.91 ATRIAL FIBRILLATION, UNSPECIFIED TYPE (HCC): ICD-10-CM

## 2022-09-01 PROBLEM — R07.9 CHEST PAIN: Status: RESOLVED | Noted: 2021-09-02 | Resolved: 2022-09-01

## 2022-09-01 PROBLEM — I49.9 CARDIAC ARRHYTHMIA: Status: RESOLVED | Noted: 2021-03-20 | Resolved: 2022-09-01

## 2022-09-01 PROBLEM — Z13.9 ENCOUNTER FOR SCREENING INVOLVING SOCIAL DETERMINANTS OF HEALTH (SDOH): Status: RESOLVED | Noted: 2022-08-02 | Resolved: 2022-09-01

## 2022-09-01 PROBLEM — R51.9 INTRACTABLE HEADACHE: Status: RESOLVED | Noted: 2022-08-29 | Resolved: 2022-09-01

## 2022-09-01 PROBLEM — J44.1 COPD EXACERBATION (HCC): Status: RESOLVED | Noted: 2022-07-10 | Resolved: 2022-09-01

## 2022-09-01 PROCEDURE — 99214 OFFICE O/P EST MOD 30 MIN: CPT | Performed by: INTERNAL MEDICINE

## 2022-09-01 PROCEDURE — G8417 CALC BMI ABV UP PARAM F/U: HCPCS | Performed by: INTERNAL MEDICINE

## 2022-09-01 PROCEDURE — 3017F COLORECTAL CA SCREEN DOC REV: CPT | Performed by: INTERNAL MEDICINE

## 2022-09-01 PROCEDURE — G8427 DOCREV CUR MEDS BY ELIG CLIN: HCPCS | Performed by: INTERNAL MEDICINE

## 2022-09-01 PROCEDURE — 4004F PT TOBACCO SCREEN RCVD TLK: CPT | Performed by: INTERNAL MEDICINE

## 2022-09-01 PROCEDURE — 93000 ELECTROCARDIOGRAM COMPLETE: CPT | Performed by: INTERNAL MEDICINE

## 2022-09-01 NOTE — PROGRESS NOTES
Chief Complaint   Patient presents with    Atrial Fibrillation    Coronary Artery Disease       Patient Active Problem List    Diagnosis Date Noted    Atrial fibrillation ([facility] 09/01/2022     Overview Note:     A. CHADSVASC = 2        Tobacco dependency 08/29/2022    Depression with anxiety 08/02/2022    Psychophysiological insomnia 08/02/2022    CAD in native artery 07/13/2022     Overview Note:     A. CATH 7/2022 ([place]): RCA , no Vgram done      Macrocytosis     Right adrenal mass ([facility]     Lesion of right native kidney     Essential hypertension     Type 2 diabetes mellitus with hyperglycemia, without long-term current use of insulin (HCC)     Chronic obstructive pulmonary disease (HCC)     Hyperlipidemia        Current Outpatient Medications   Medication Sig Dispense Refill    fluticasone (FLONASE) 50 MCG/ACT nasal spray 2 sprays by Each Nostril route daily 16 g 0    acetaminophen (TYLENOL) 500 MG tablet Take 2 tablets by mouth 3 times daily 180 tablet 0    aspirin 81 MG EC tablet Take 1 tablet by mouth in the morning. 30 tablet 11    sertraline (ZOLOFT) 50 MG tablet Take 1 tablet by mouth in the morning. 30 tablet 11    QUEtiapine (SEROQUEL) 25 MG tablet Take 2 tablets by mouth nightly 60 tablet 11    SYMBICORT 80-4.5 MCG/ACT AERO Inhale 2 puffs into the lungs in the morning and 2 puffs before bedtime.  1 each 11    albuterol sulfate HFA (PROAIR HFA) 108 (90 Base) MCG/ACT inhaler Inhale 2 puffs into the lungs every 6 hours as needed for Wheezing 1 each 11    omeprazole (PRILOSEC) 40 MG delayed release capsule Take 1 capsule by mouth daily as needed (Reflux) 30 capsule 11    nicotine (NICODERM CQ) 14 MG/24HR Place 1 patch onto the skin every 24 hours 30 patch 0    atorvastatin (LIPITOR) 40 MG tablet Take 1 tablet by mouth nightly 30 tablet 11    mometasone-formoterol (DULERA) 200-5 MCG/ACT inhaler Inhale 2 puffs into the lungs every 12 hours 1 each 0     No current facility-administered medications for this visit. No Known Allergies    Vitals:    09/01/22 1236   BP: (!) 151/102   Pulse: (!) 122   Resp: 14   Weight: 247 lb (112 kg)   Height: 5' 11\" (1.803 m)                 SUBJECTIVE: Conor Lindsay presents to the office today for hfu.  DR MALIKA ABERNATHY PATIENT. Hx of PAF, CAD. Has in hospital in march and July - I reviewed the hospital records, and actual ekg, CXR, cath and echo images     He complains of dyspnea - COPD and continues to smoke - and denies ANGINAL   chest pain. Physical Exam   BP (!) 151/102   Pulse (!) 122   Resp 14   Ht 5' 11\" (1.803 m)   Wt 247 lb (112 kg)   BMI 34.45 kg/m²   Constitutional: Oriented to person, place, and time. Obese No distress. Head: Normocephalic and atraumatic. Neck: No hepatojugular reflux and no JVD present. Carotid bruit is not present. Cardiovascular: Normal rate, regular rhythm, normal heart sounds and intact distal pulses. No gallop and no friction rub. No murmur heard. Pulmonary/Chest: Effort normal and breath sounds normal. No respiratory distress. No wheezes. No rales. Abdominal: Soft. Bowel sounds are normal. No distension and no mass. No tenderness. No rebound and no guarding. Musculoskeletal: No edema and no tenderness. Neurological: Alert and oriented to person, place, and time. Skin: Skin is warm and dry. No rash noted. Psychiatric: Normal mood and affect.  Behavior is normal.     EKG:  nonspecific ST and T waves changes, sinus tachycardia, rate 122 bpm     ASSESSMENT AND PLAN:  Patient Active Problem List   Diagnosis    Right adrenal mass (HCC)    Lesion of right native kidney    Essential hypertension    Type 2 diabetes mellitus with hyperglycemia, without long-term current use of insulin (HCC)    Chronic obstructive pulmonary disease (HCC)    Hyperlipidemia    Macrocytosis    CAD in native artery    Depression with anxiety    Psychophysiological insomnia    Tobacco dependency    Atrial fibrillation [facility])     Patient admitted with CLEARLY non cardiac chest pain by [facility] cardiologist's own history, but ordered Meaghan Garner which led to cardiac cath. Has  of RCA and no other high grade obstructive lesions  Needs high intensity statin for CAD - target LDL < 70  Hx of AF, with EKG of 3/20/2021 showing AF - -not on [address] ( [place]) despite elevated CHADSVASC, and not on Beta Blocker - was on 50 mg bid entering hospital, sent home on 25 bid but never picked up script  He will do so now      Return visit in 1 month with dr Aman Peters.     Diamond Hercules M.D  [facility] Cardiology

## 2022-09-30 ENCOUNTER — TELEPHONE (OUTPATIENT)
Dept: FAMILY MEDICINE CLINIC | Age: 62
End: 2022-09-30

## 2022-09-30 NOTE — TELEPHONE ENCOUNTER
Leo Medina [facility] coordinator for patient left message that patient needs to have a letter from pcp stating that it is a medical necessity for patient to continue to have electricity as is dependent on oxygen. States that needs to have it for 30 days. Letter to be faxed to [address] [place] [address] at [phone]. Attention: Emerald Robb. Returned call to Leo Medina regarding the letter. Informed Lawyer Jefferson that usually a form is faxed from the [facility] for certification of medical necessity to keep electric on and when signed the form will be faxed back. Lawyer Jefferson voiced understanding of this and will contact Emerald Robb and get the form faxed to office on Monday, 10/3/2022.

## 2022-09-30 NOTE — LETTER
[address] [place] [address] [address]  [address] [facility] [place] [address]  Dept: [phone]  Dept Fax: [phone]                                                                                                                                                                                           10/5/2022     To whom it may concern: This letter is regarding a patient in our practice, David Montiel  1960 who is dependent on supplemental oxygen. He has a medical necessity to have his electricity on due to this condition.     Yours truly,        Ada Stringer MD

## 2022-10-04 NOTE — TELEPHONE ENCOUNTER
Leo Adam called to follow up on request and stated she just spoke w/Allison from [facility] and was informed that a letter of medical necessity is all that is required by them. Lawyer Jefferson informed they do not have a special form to be completed. Please see attached msg with info.

## 2023-07-02 ENCOUNTER — APPOINTMENT (OUTPATIENT)
Dept: GENERAL RADIOLOGY | Age: 63
DRG: 201 | End: 2023-07-02
Payer: COMMERCIAL

## 2023-07-02 ENCOUNTER — HOSPITAL ENCOUNTER (INPATIENT)
Age: 63
LOS: 8 days | Discharge: NURSING, REHAB OR LONG-TERM CARE | DRG: 201 | End: 2023-07-10
Attending: EMERGENCY MEDICINE | Admitting: INTERNAL MEDICINE
Payer: COMMERCIAL

## 2023-07-02 DIAGNOSIS — F10.930 ALCOHOL WITHDRAWAL SYNDROME WITHOUT COMPLICATION (HCC): ICD-10-CM

## 2023-07-02 DIAGNOSIS — R06.09 EXERTIONAL DYSPNEA: ICD-10-CM

## 2023-07-02 DIAGNOSIS — F41.9 ANXIETY: ICD-10-CM

## 2023-07-02 DIAGNOSIS — I48.91 ATRIAL FIBRILLATION WITH RVR (HCC): Primary | ICD-10-CM

## 2023-07-02 LAB
ALBUMIN SERPL-MCNC: 4.2 G/DL (ref 3.5–5.2)
ALP SERPL-CCNC: 116 U/L (ref 40–129)
ALT SERPL-CCNC: 39 U/L (ref 0–40)
ANION GAP SERPL CALCULATED.3IONS-SCNC: 27 MMOL/L (ref 7–16)
APAP SERPL-MCNC: <5 MCG/ML (ref 10–30)
AST SERPL-CCNC: 50 U/L (ref 0–39)
BASOPHILS # BLD: 0.03 E9/L (ref 0–0.2)
BASOPHILS NFR BLD: 0.3 % (ref 0–2)
BILIRUB SERPL-MCNC: 0.9 MG/DL (ref 0–1.2)
BNP BLD-MCNC: 1070 PG/ML (ref 0–125)
BUN SERPL-MCNC: 17 MG/DL (ref 6–23)
CALCIUM SERPL-MCNC: 9.5 MG/DL (ref 8.6–10.2)
CHLORIDE SERPL-SCNC: 89 MMOL/L (ref 98–107)
CK SERPL-CCNC: 87 U/L (ref 20–200)
CO2 SERPL-SCNC: 18 MMOL/L (ref 22–29)
CREAT SERPL-MCNC: 1.2 MG/DL (ref 0.7–1.2)
EOSINOPHIL # BLD: 0.03 E9/L (ref 0.05–0.5)
EOSINOPHIL NFR BLD: 0.3 % (ref 0–6)
ERYTHROCYTE [DISTWIDTH] IN BLOOD BY AUTOMATED COUNT: 15.2 FL (ref 11.5–15)
ETHANOLAMINE SERPL-MCNC: <10 MG/DL (ref 0–0.08)
GLUCOSE SERPL-MCNC: 162 MG/DL (ref 74–99)
HCT VFR BLD AUTO: 45.2 % (ref 37–54)
HGB BLD-MCNC: 15.7 G/DL (ref 12.5–16.5)
IMM GRANULOCYTES # BLD: 0.06 E9/L
IMM GRANULOCYTES NFR BLD: 0.6 % (ref 0–5)
LYMPHOCYTES # BLD: 2.35 E9/L (ref 1.5–4)
LYMPHOCYTES NFR BLD: 25.2 % (ref 20–42)
MAGNESIUM SERPL-MCNC: 1.5 MG/DL (ref 1.6–2.6)
MCH RBC QN AUTO: 36.1 PG (ref 26–35)
MCHC RBC AUTO-ENTMCNC: 34.7 % (ref 32–34.5)
MCV RBC AUTO: 103.9 FL (ref 80–99.9)
METER GLUCOSE: 125 MG/DL (ref 74–99)
MONOCYTES # BLD: 0.85 E9/L (ref 0.1–0.95)
MONOCYTES NFR BLD: 9.1 % (ref 2–12)
NEUTROPHILS # BLD: 6.02 E9/L (ref 1.8–7.3)
NEUTS SEG NFR BLD: 64.5 % (ref 43–80)
PLATELET # BLD AUTO: 142 E9/L (ref 130–450)
PMV BLD AUTO: 11.2 FL (ref 7–12)
POTASSIUM SERPL-SCNC: 4 MMOL/L (ref 3.5–5)
PROT SERPL-MCNC: 7.4 G/DL (ref 6.4–8.3)
RBC # BLD AUTO: 4.35 E12/L (ref 3.8–5.8)
REASON FOR REJECTION: NORMAL
REJECTED TEST: NORMAL
SALICYLATES SERPL-MCNC: <0.3 MG/DL (ref 0–30)
SODIUM SERPL-SCNC: 134 MMOL/L (ref 132–146)
T4 FREE SERPL-MCNC: 1.18 NG/DL (ref 0.93–1.7)
TRICYCLIC ANTIDEPRESSANTS SCREEN SERUM: NEGATIVE NG/ML
TROPONIN, HIGH SENSITIVITY: 24 NG/L (ref 0–11)
TSH SERPL-MCNC: 2.3 UIU/ML (ref 0.27–4.2)
WBC # BLD: 9.3 E9/L (ref 4.5–11.5)

## 2023-07-02 PROCEDURE — 2700000000 HC OXYGEN THERAPY PER DAY

## 2023-07-02 PROCEDURE — 84443 ASSAY THYROID STIM HORMONE: CPT

## 2023-07-02 PROCEDURE — 80053 COMPREHEN METABOLIC PANEL: CPT

## 2023-07-02 PROCEDURE — 80179 DRUG ASSAY SALICYLATE: CPT

## 2023-07-02 PROCEDURE — 6360000002 HC RX W HCPCS: Performed by: INTERNAL MEDICINE

## 2023-07-02 PROCEDURE — 96374 THER/PROPH/DIAG INJ IV PUSH: CPT

## 2023-07-02 PROCEDURE — 83735 ASSAY OF MAGNESIUM: CPT

## 2023-07-02 PROCEDURE — 6370000000 HC RX 637 (ALT 250 FOR IP): Performed by: INTERNAL MEDICINE

## 2023-07-02 PROCEDURE — 2500000003 HC RX 250 WO HCPCS: Performed by: INTERNAL MEDICINE

## 2023-07-02 PROCEDURE — 94664 DEMO&/EVAL PT USE INHALER: CPT

## 2023-07-02 PROCEDURE — 2580000003 HC RX 258: Performed by: EMERGENCY MEDICINE

## 2023-07-02 PROCEDURE — 82550 ASSAY OF CK (CPK): CPT

## 2023-07-02 PROCEDURE — 99223 1ST HOSP IP/OBS HIGH 75: CPT | Performed by: INTERNAL MEDICINE

## 2023-07-02 PROCEDURE — 6360000002 HC RX W HCPCS: Performed by: EMERGENCY MEDICINE

## 2023-07-02 PROCEDURE — 2060000000 HC ICU INTERMEDIATE R&B

## 2023-07-02 PROCEDURE — 80307 DRUG TEST PRSMV CHEM ANLYZR: CPT

## 2023-07-02 PROCEDURE — 84439 ASSAY OF FREE THYROXINE: CPT

## 2023-07-02 PROCEDURE — 96361 HYDRATE IV INFUSION ADD-ON: CPT

## 2023-07-02 PROCEDURE — 82077 ASSAY SPEC XCP UR&BREATH IA: CPT

## 2023-07-02 PROCEDURE — 84484 ASSAY OF TROPONIN QUANT: CPT

## 2023-07-02 PROCEDURE — 36415 COLL VENOUS BLD VENIPUNCTURE: CPT

## 2023-07-02 PROCEDURE — 6360000002 HC RX W HCPCS

## 2023-07-02 PROCEDURE — 82962 GLUCOSE BLOOD TEST: CPT

## 2023-07-02 PROCEDURE — 80143 DRUG ASSAY ACETAMINOPHEN: CPT

## 2023-07-02 PROCEDURE — 99285 EMERGENCY DEPT VISIT HI MDM: CPT

## 2023-07-02 PROCEDURE — 85025 COMPLETE CBC W/AUTO DIFF WBC: CPT

## 2023-07-02 PROCEDURE — 94640 AIRWAY INHALATION TREATMENT: CPT

## 2023-07-02 PROCEDURE — 6370000000 HC RX 637 (ALT 250 FOR IP): Performed by: EMERGENCY MEDICINE

## 2023-07-02 PROCEDURE — 71045 X-RAY EXAM CHEST 1 VIEW: CPT

## 2023-07-02 PROCEDURE — 83880 ASSAY OF NATRIURETIC PEPTIDE: CPT

## 2023-07-02 RX ORDER — GAUZE BANDAGE 2" X 2"
100 BANDAGE TOPICAL DAILY
Status: DISCONTINUED | OUTPATIENT
Start: 2023-07-02 | End: 2023-07-03

## 2023-07-02 RX ORDER — METOPROLOL SUCCINATE 25 MG/1
25 TABLET, EXTENDED RELEASE ORAL DAILY
Status: DISCONTINUED | OUTPATIENT
Start: 2023-07-02 | End: 2023-07-04

## 2023-07-02 RX ORDER — ASPIRIN 81 MG/1
81 TABLET ORAL DAILY
Status: DISCONTINUED | OUTPATIENT
Start: 2023-07-03 | End: 2023-07-10 | Stop reason: HOSPADM

## 2023-07-02 RX ORDER — QUETIAPINE FUMARATE 25 MG/1
50 TABLET, FILM COATED ORAL NIGHTLY
Status: DISCONTINUED | OUTPATIENT
Start: 2023-07-02 | End: 2023-07-10 | Stop reason: HOSPADM

## 2023-07-02 RX ORDER — MAGNESIUM SULFATE IN WATER 40 MG/ML
2000 INJECTION, SOLUTION INTRAVENOUS ONCE
Status: COMPLETED | OUTPATIENT
Start: 2023-07-02 | End: 2023-07-02

## 2023-07-02 RX ORDER — NICOTINE 21 MG/24HR
1 PATCH, TRANSDERMAL 24 HOURS TRANSDERMAL DAILY
Status: DISCONTINUED | OUTPATIENT
Start: 2023-07-02 | End: 2023-07-10 | Stop reason: HOSPADM

## 2023-07-02 RX ORDER — 0.9 % SODIUM CHLORIDE 0.9 %
1000 INTRAVENOUS SOLUTION INTRAVENOUS ONCE
Status: COMPLETED | OUTPATIENT
Start: 2023-07-02 | End: 2023-07-02

## 2023-07-02 RX ORDER — LORAZEPAM 2 MG/ML
4 INJECTION INTRAMUSCULAR
Status: DISCONTINUED | OUTPATIENT
Start: 2023-07-02 | End: 2023-07-06

## 2023-07-02 RX ORDER — SODIUM CHLORIDE 0.9 % (FLUSH) 0.9 %
5-40 SYRINGE (ML) INJECTION PRN
Status: DISCONTINUED | OUTPATIENT
Start: 2023-07-02 | End: 2023-07-10 | Stop reason: HOSPADM

## 2023-07-02 RX ORDER — ONDANSETRON 2 MG/ML
4 INJECTION INTRAMUSCULAR; INTRAVENOUS ONCE
Status: COMPLETED | OUTPATIENT
Start: 2023-07-02 | End: 2023-07-02

## 2023-07-02 RX ORDER — LORAZEPAM 1 MG/1
2 TABLET ORAL
Status: DISCONTINUED | OUTPATIENT
Start: 2023-07-02 | End: 2023-07-06

## 2023-07-02 RX ORDER — SODIUM CHLORIDE 9 MG/ML
INJECTION, SOLUTION INTRAVENOUS PRN
Status: DISCONTINUED | OUTPATIENT
Start: 2023-07-02 | End: 2023-07-10 | Stop reason: HOSPADM

## 2023-07-02 RX ORDER — PHENOBARBITAL SODIUM 65 MG/ML
32.5 INJECTION INTRAMUSCULAR 4 TIMES DAILY
Status: DISCONTINUED | OUTPATIENT
Start: 2023-07-03 | End: 2023-07-02

## 2023-07-02 RX ORDER — PANTOPRAZOLE SODIUM 40 MG/1
40 TABLET, DELAYED RELEASE ORAL
Status: DISCONTINUED | OUTPATIENT
Start: 2023-07-03 | End: 2023-07-10 | Stop reason: HOSPADM

## 2023-07-02 RX ORDER — PHENOBARBITAL SODIUM 65 MG/ML
65 INJECTION INTRAMUSCULAR 4 TIMES DAILY
Status: DISPENSED | OUTPATIENT
Start: 2023-07-02 | End: 2023-07-03

## 2023-07-02 RX ORDER — SODIUM CHLORIDE 0.9 % (FLUSH) 0.9 %
5-40 SYRINGE (ML) INJECTION EVERY 12 HOURS SCHEDULED
Status: DISCONTINUED | OUTPATIENT
Start: 2023-07-02 | End: 2023-07-10 | Stop reason: HOSPADM

## 2023-07-02 RX ORDER — ONDANSETRON 4 MG/1
4 TABLET, ORALLY DISINTEGRATING ORAL EVERY 8 HOURS PRN
Status: DISCONTINUED | OUTPATIENT
Start: 2023-07-02 | End: 2023-07-10 | Stop reason: HOSPADM

## 2023-07-02 RX ORDER — PHENOBARBITAL SODIUM 65 MG/ML
16.2 INJECTION INTRAMUSCULAR 2 TIMES DAILY
Status: DISCONTINUED | OUTPATIENT
Start: 2023-07-05 | End: 2023-07-02

## 2023-07-02 RX ORDER — LORAZEPAM 1 MG/1
3 TABLET ORAL
Status: DISCONTINUED | OUTPATIENT
Start: 2023-07-02 | End: 2023-07-06

## 2023-07-02 RX ORDER — PHENOBARBITAL SODIUM 65 MG/ML
INJECTION INTRAMUSCULAR
Status: COMPLETED
Start: 2023-07-02 | End: 2023-07-02

## 2023-07-02 RX ORDER — METOPROLOL TARTRATE 5 MG/5ML
5 INJECTION INTRAVENOUS EVERY 6 HOURS PRN
Status: DISCONTINUED | OUTPATIENT
Start: 2023-07-02 | End: 2023-07-10 | Stop reason: HOSPADM

## 2023-07-02 RX ORDER — PHENOBARBITAL SODIUM 65 MG/ML
32.5 INJECTION INTRAMUSCULAR 2 TIMES DAILY
Status: DISCONTINUED | OUTPATIENT
Start: 2023-07-04 | End: 2023-07-02

## 2023-07-02 RX ORDER — ARFORMOTEROL TARTRATE 15 UG/2ML
15 SOLUTION RESPIRATORY (INHALATION) 2 TIMES DAILY
Status: DISCONTINUED | OUTPATIENT
Start: 2023-07-02 | End: 2023-07-10 | Stop reason: HOSPADM

## 2023-07-02 RX ORDER — PHENOBARBITAL SODIUM 65 MG/ML
32.5 INJECTION INTRAMUSCULAR 4 TIMES DAILY
Status: COMPLETED | OUTPATIENT
Start: 2023-07-03 | End: 2023-07-04

## 2023-07-02 RX ORDER — BUDESONIDE AND FORMOTEROL FUMARATE DIHYDRATE 80; 4.5 UG/1; UG/1
2 AEROSOL RESPIRATORY (INHALATION) 2 TIMES DAILY
Status: DISCONTINUED | OUTPATIENT
Start: 2023-07-02 | End: 2023-07-02

## 2023-07-02 RX ORDER — PHENOBARBITAL SODIUM 65 MG/ML
16.2 INJECTION INTRAMUSCULAR 2 TIMES DAILY
Status: COMPLETED | OUTPATIENT
Start: 2023-07-05 | End: 2023-07-06

## 2023-07-02 RX ORDER — ACETAMINOPHEN 325 MG/1
650 TABLET ORAL EVERY 6 HOURS PRN
Status: DISCONTINUED | OUTPATIENT
Start: 2023-07-02 | End: 2023-07-10 | Stop reason: HOSPADM

## 2023-07-02 RX ORDER — ONDANSETRON 2 MG/ML
4 INJECTION INTRAMUSCULAR; INTRAVENOUS EVERY 6 HOURS PRN
Status: DISCONTINUED | OUTPATIENT
Start: 2023-07-02 | End: 2023-07-10 | Stop reason: HOSPADM

## 2023-07-02 RX ORDER — LORAZEPAM 2 MG/ML
1 INJECTION INTRAMUSCULAR
Status: DISCONTINUED | OUTPATIENT
Start: 2023-07-02 | End: 2023-07-06

## 2023-07-02 RX ORDER — LORAZEPAM 2 MG/ML
2 INJECTION INTRAMUSCULAR
Status: DISCONTINUED | OUTPATIENT
Start: 2023-07-02 | End: 2023-07-06

## 2023-07-02 RX ORDER — LORAZEPAM 1 MG/1
4 TABLET ORAL
Status: DISCONTINUED | OUTPATIENT
Start: 2023-07-02 | End: 2023-07-06

## 2023-07-02 RX ORDER — METOPROLOL TARTRATE 5 MG/5ML
5 INJECTION INTRAVENOUS ONCE
Status: COMPLETED | OUTPATIENT
Start: 2023-07-02 | End: 2023-07-02

## 2023-07-02 RX ORDER — PHENOBARBITAL SODIUM 65 MG/ML
65 INJECTION INTRAMUSCULAR 4 TIMES DAILY
Status: DISCONTINUED | OUTPATIENT
Start: 2023-07-02 | End: 2023-07-02

## 2023-07-02 RX ORDER — LORAZEPAM 2 MG/ML
3 INJECTION INTRAMUSCULAR
Status: DISCONTINUED | OUTPATIENT
Start: 2023-07-02 | End: 2023-07-06

## 2023-07-02 RX ORDER — ACETAMINOPHEN 650 MG/1
650 SUPPOSITORY RECTAL EVERY 6 HOURS PRN
Status: DISCONTINUED | OUTPATIENT
Start: 2023-07-02 | End: 2023-07-10 | Stop reason: HOSPADM

## 2023-07-02 RX ORDER — ENOXAPARIN SODIUM 150 MG/ML
1 INJECTION SUBCUTANEOUS 2 TIMES DAILY
Status: DISCONTINUED | OUTPATIENT
Start: 2023-07-02 | End: 2023-07-04

## 2023-07-02 RX ORDER — LORAZEPAM 1 MG/1
1 TABLET ORAL
Status: DISCONTINUED | OUTPATIENT
Start: 2023-07-02 | End: 2023-07-06

## 2023-07-02 RX ORDER — PHENOBARBITAL SODIUM 65 MG/ML
32.5 INJECTION INTRAMUSCULAR 2 TIMES DAILY
Status: COMPLETED | OUTPATIENT
Start: 2023-07-04 | End: 2023-07-05

## 2023-07-02 RX ORDER — POLYETHYLENE GLYCOL 3350 17 G/17G
17 POWDER, FOR SOLUTION ORAL DAILY PRN
Status: DISCONTINUED | OUTPATIENT
Start: 2023-07-02 | End: 2023-07-10 | Stop reason: HOSPADM

## 2023-07-02 RX ORDER — ATORVASTATIN CALCIUM 40 MG/1
40 TABLET, FILM COATED ORAL NIGHTLY
Status: DISCONTINUED | OUTPATIENT
Start: 2023-07-02 | End: 2023-07-10 | Stop reason: HOSPADM

## 2023-07-02 RX ORDER — BUDESONIDE 0.25 MG/2ML
0.25 INHALANT ORAL 2 TIMES DAILY
Status: DISCONTINUED | OUTPATIENT
Start: 2023-07-02 | End: 2023-07-10 | Stop reason: HOSPADM

## 2023-07-02 RX ADMIN — QUETIAPINE FUMARATE 50 MG: 25 TABLET ORAL at 20:26

## 2023-07-02 RX ADMIN — LORAZEPAM 2 MG: 2 INJECTION INTRAMUSCULAR; INTRAVENOUS at 16:14

## 2023-07-02 RX ADMIN — BUDESONIDE 250 MCG: 0.25 SUSPENSION RESPIRATORY (INHALATION) at 21:44

## 2023-07-02 RX ADMIN — ARFORMOTEROL TARTRATE 15 MCG: 15 SOLUTION RESPIRATORY (INHALATION) at 21:44

## 2023-07-02 RX ADMIN — METOPROLOL TARTRATE 5 MG: 5 INJECTION INTRAVENOUS at 17:03

## 2023-07-02 RX ADMIN — Medication 10 ML: at 14:55

## 2023-07-02 RX ADMIN — PHENOBARBITAL SODIUM 65 MG: 65 INJECTION INTRAMUSCULAR at 18:47

## 2023-07-02 RX ADMIN — LORAZEPAM 3 MG: 2 INJECTION INTRAMUSCULAR; INTRAVENOUS at 14:58

## 2023-07-02 RX ADMIN — ENOXAPARIN SODIUM 120 MG: 150 INJECTION SUBCUTANEOUS at 20:26

## 2023-07-02 RX ADMIN — LORAZEPAM 4 MG: 2 INJECTION INTRAMUSCULAR; INTRAVENOUS at 13:38

## 2023-07-02 RX ADMIN — ATORVASTATIN CALCIUM 40 MG: 40 TABLET, FILM COATED ORAL at 20:26

## 2023-07-02 RX ADMIN — Medication 100 MG: at 11:23

## 2023-07-02 RX ADMIN — Medication 10 ML: at 20:26

## 2023-07-02 RX ADMIN — SODIUM CHLORIDE 1000 ML: 9 INJECTION, SOLUTION INTRAVENOUS at 10:46

## 2023-07-02 RX ADMIN — ONDANSETRON 4 MG: 2 INJECTION INTRAMUSCULAR; INTRAVENOUS at 18:17

## 2023-07-02 RX ADMIN — SODIUM CHLORIDE 1000 ML: 9 INJECTION, SOLUTION INTRAVENOUS at 08:20

## 2023-07-02 RX ADMIN — MAGNESIUM SULFATE HEPTAHYDRATE 2000 MG: 40 INJECTION, SOLUTION INTRAVENOUS at 11:24

## 2023-07-02 RX ADMIN — METOPROLOL SUCCINATE 25 MG: 25 TABLET, EXTENDED RELEASE ORAL at 18:53

## 2023-07-02 RX ADMIN — LORAZEPAM 3 MG: 2 INJECTION INTRAMUSCULAR; INTRAVENOUS at 11:19

## 2023-07-02 ASSESSMENT — PAIN SCALES - GENERAL: PAINLEVEL_OUTOF10: 0

## 2023-07-02 ASSESSMENT — ENCOUNTER SYMPTOMS
CHEST TIGHTNESS: 0
SHORTNESS OF BREATH: 0

## 2023-07-02 ASSESSMENT — LIFESTYLE VARIABLES: HOW OFTEN DO YOU HAVE A DRINK CONTAINING ALCOHOL: 4 OR MORE TIMES A WEEK

## 2023-07-02 ASSESSMENT — PAIN - FUNCTIONAL ASSESSMENT
PAIN_FUNCTIONAL_ASSESSMENT: NONE - DENIES PAIN
PAIN_FUNCTIONAL_ASSESSMENT: NONE - DENIES PAIN

## 2023-07-03 ENCOUNTER — APPOINTMENT (OUTPATIENT)
Dept: ULTRASOUND IMAGING | Age: 63
DRG: 201 | End: 2023-07-03
Payer: COMMERCIAL

## 2023-07-03 PROBLEM — I50.33 ACUTE ON CHRONIC HEART FAILURE WITH PRESERVED EJECTION FRACTION (HCC): Status: ACTIVE | Noted: 2023-07-03

## 2023-07-03 PROBLEM — I50.9 ACUTE DECOMPENSATED HEART FAILURE (HCC): Status: ACTIVE | Noted: 2023-07-03

## 2023-07-03 PROBLEM — E66.9 OBESITY (BMI 35.0-39.9 WITHOUT COMORBIDITY): Status: ACTIVE | Noted: 2023-07-03

## 2023-07-03 PROBLEM — F10.930 ALCOHOL WITHDRAWAL SYNDROME WITHOUT COMPLICATION (HCC): Status: ACTIVE | Noted: 2023-07-03

## 2023-07-03 PROBLEM — G47.33 OSA (OBSTRUCTIVE SLEEP APNEA): Status: ACTIVE | Noted: 2023-07-03

## 2023-07-03 PROBLEM — D69.6 THROMBOCYTOPENIA (HCC): Status: ACTIVE | Noted: 2023-07-03

## 2023-07-03 PROBLEM — K70.9 ALCOHOLIC LIVER DISEASE (HCC): Status: ACTIVE | Noted: 2023-07-03

## 2023-07-03 LAB
ANION GAP SERPL CALCULATED.3IONS-SCNC: 12 MMOL/L (ref 7–16)
ANION GAP SERPL CALCULATED.3IONS-SCNC: 16 MMOL/L (ref 7–16)
BUN SERPL-MCNC: 14 MG/DL (ref 6–23)
BUN SERPL-MCNC: 15 MG/DL (ref 6–23)
CALCIUM SERPL-MCNC: 8.6 MG/DL (ref 8.6–10.2)
CALCIUM SERPL-MCNC: 9.2 MG/DL (ref 8.6–10.2)
CHLORIDE SERPL-SCNC: 96 MMOL/L (ref 98–107)
CHLORIDE SERPL-SCNC: 97 MMOL/L (ref 98–107)
CHOLESTEROL, TOTAL: 139 MG/DL (ref 0–199)
CO2 SERPL-SCNC: 23 MMOL/L (ref 22–29)
CO2 SERPL-SCNC: 28 MMOL/L (ref 22–29)
CREAT SERPL-MCNC: 0.9 MG/DL (ref 0.7–1.2)
CREAT SERPL-MCNC: 1 MG/DL (ref 0.7–1.2)
ERYTHROCYTE [DISTWIDTH] IN BLOOD BY AUTOMATED COUNT: 15.7 FL (ref 11.5–15)
GLUCOSE SERPL-MCNC: 103 MG/DL (ref 74–99)
GLUCOSE SERPL-MCNC: 111 MG/DL (ref 74–99)
HCT VFR BLD AUTO: 40.7 % (ref 37–54)
HDLC SERPL-MCNC: 51 MG/DL
HGB BLD-MCNC: 13.5 G/DL (ref 12.5–16.5)
INR BLD: 1
LDLC SERPL CALC-MCNC: 61 MG/DL (ref 0–99)
LV EF: 58 %
LVEF MODALITY: NORMAL
MAGNESIUM SERPL-MCNC: 1.8 MG/DL (ref 1.6–2.6)
MAGNESIUM SERPL-MCNC: 1.8 MG/DL (ref 1.6–2.6)
MCH RBC QN AUTO: 35.8 PG (ref 26–35)
MCHC RBC AUTO-ENTMCNC: 33.2 % (ref 32–34.5)
MCV RBC AUTO: 108 FL (ref 80–99.9)
PLATELET # BLD AUTO: 56 E9/L (ref 130–450)
PLATELET CONFIRMATION: NORMAL
PMV BLD AUTO: 12.4 FL (ref 7–12)
POTASSIUM SERPL-SCNC: 3.3 MMOL/L (ref 3.5–5)
POTASSIUM SERPL-SCNC: 3.5 MMOL/L (ref 3.5–5)
PROTHROMBIN TIME: 11.7 SEC (ref 9.3–12.4)
RBC # BLD AUTO: 3.77 E12/L (ref 3.8–5.8)
SODIUM SERPL-SCNC: 136 MMOL/L (ref 132–146)
SODIUM SERPL-SCNC: 136 MMOL/L (ref 132–146)
TRIGL SERPL-MCNC: 133 MG/DL (ref 0–149)
TROPONIN, HIGH SENSITIVITY: 19 NG/L (ref 0–11)
VLDLC SERPL CALC-MCNC: 27 MG/DL
WBC # BLD: 4.2 E9/L (ref 4.5–11.5)

## 2023-07-03 PROCEDURE — 83735 ASSAY OF MAGNESIUM: CPT

## 2023-07-03 PROCEDURE — 99223 1ST HOSP IP/OBS HIGH 75: CPT | Performed by: INTERNAL MEDICINE

## 2023-07-03 PROCEDURE — 80048 BASIC METABOLIC PNL TOTAL CA: CPT

## 2023-07-03 PROCEDURE — 6370000000 HC RX 637 (ALT 250 FOR IP): Performed by: INTERNAL MEDICINE

## 2023-07-03 PROCEDURE — 99233 SBSQ HOSP IP/OBS HIGH 50: CPT | Performed by: INTERNAL MEDICINE

## 2023-07-03 PROCEDURE — 2700000000 HC OXYGEN THERAPY PER DAY

## 2023-07-03 PROCEDURE — 84484 ASSAY OF TROPONIN QUANT: CPT

## 2023-07-03 PROCEDURE — 6360000002 HC RX W HCPCS: Performed by: INTERNAL MEDICINE

## 2023-07-03 PROCEDURE — 6360000004 HC RX CONTRAST MEDICATION: Performed by: INTERNAL MEDICINE

## 2023-07-03 PROCEDURE — 36415 COLL VENOUS BLD VENIPUNCTURE: CPT

## 2023-07-03 PROCEDURE — 76604 US EXAM CHEST: CPT

## 2023-07-03 PROCEDURE — 2060000000 HC ICU INTERMEDIATE R&B

## 2023-07-03 PROCEDURE — 80061 LIPID PANEL: CPT

## 2023-07-03 PROCEDURE — 2580000003 HC RX 258: Performed by: INTERNAL MEDICINE

## 2023-07-03 PROCEDURE — APPSS60 APP SPLIT SHARED TIME 46-60 MINUTES: Performed by: CLINICAL NURSE SPECIALIST

## 2023-07-03 PROCEDURE — 85610 PROTHROMBIN TIME: CPT

## 2023-07-03 PROCEDURE — 2580000003 HC RX 258: Performed by: EMERGENCY MEDICINE

## 2023-07-03 PROCEDURE — 94640 AIRWAY INHALATION TREATMENT: CPT

## 2023-07-03 PROCEDURE — 6360000002 HC RX W HCPCS: Performed by: CLINICAL NURSE SPECIALIST

## 2023-07-03 PROCEDURE — 85027 COMPLETE CBC AUTOMATED: CPT

## 2023-07-03 PROCEDURE — C8929 TTE W OR WO FOL WCON,DOPPLER: HCPCS

## 2023-07-03 PROCEDURE — 6370000000 HC RX 637 (ALT 250 FOR IP): Performed by: EMERGENCY MEDICINE

## 2023-07-03 RX ORDER — MAGNESIUM CHLORIDE 64 MG
2 TABLET, DELAYED RELEASE (ENTERIC COATED) ORAL
Status: COMPLETED | OUTPATIENT
Start: 2023-07-04 | End: 2023-07-06

## 2023-07-03 RX ORDER — THIAMINE HYDROCHLORIDE 100 MG/ML
100 INJECTION, SOLUTION INTRAMUSCULAR; INTRAVENOUS DAILY
Status: DISCONTINUED | OUTPATIENT
Start: 2023-07-04 | End: 2023-07-06 | Stop reason: CLARIF

## 2023-07-03 RX ORDER — FUROSEMIDE 10 MG/ML
40 INJECTION INTRAMUSCULAR; INTRAVENOUS 2 TIMES DAILY
Status: DISCONTINUED | OUTPATIENT
Start: 2023-07-03 | End: 2023-07-06

## 2023-07-03 RX ORDER — POTASSIUM CHLORIDE 20 MEQ/1
40 TABLET, EXTENDED RELEASE ORAL 2 TIMES DAILY
Status: COMPLETED | OUTPATIENT
Start: 2023-07-03 | End: 2023-07-03

## 2023-07-03 RX ADMIN — ARFORMOTEROL TARTRATE 15 MCG: 15 SOLUTION RESPIRATORY (INHALATION) at 06:33

## 2023-07-03 RX ADMIN — BUDESONIDE 250 MCG: 0.25 SUSPENSION RESPIRATORY (INHALATION) at 16:54

## 2023-07-03 RX ADMIN — LORAZEPAM 3 MG: 1 TABLET ORAL at 17:31

## 2023-07-03 RX ADMIN — BUDESONIDE 250 MCG: 0.25 SUSPENSION RESPIRATORY (INHALATION) at 06:33

## 2023-07-03 RX ADMIN — Medication 10 ML: at 21:13

## 2023-07-03 RX ADMIN — METOPROLOL SUCCINATE 25 MG: 25 TABLET, EXTENDED RELEASE ORAL at 08:12

## 2023-07-03 RX ADMIN — ENOXAPARIN SODIUM 120 MG: 150 INJECTION SUBCUTANEOUS at 08:11

## 2023-07-03 RX ADMIN — POTASSIUM CHLORIDE 40 MEQ: 1500 TABLET, EXTENDED RELEASE ORAL at 12:58

## 2023-07-03 RX ADMIN — PANTOPRAZOLE SODIUM 40 MG: 40 TABLET, DELAYED RELEASE ORAL at 06:15

## 2023-07-03 RX ADMIN — ATORVASTATIN CALCIUM 40 MG: 40 TABLET, FILM COATED ORAL at 21:10

## 2023-07-03 RX ADMIN — PHENOBARBITAL SODIUM 65 MG: 65 INJECTION INTRAMUSCULAR at 08:14

## 2023-07-03 RX ADMIN — PHENOBARBITAL SODIUM 32.5 MG: 65 INJECTION INTRAMUSCULAR at 21:12

## 2023-07-03 RX ADMIN — PERFLUTREN 3 ML: 6.52 INJECTION, SUSPENSION INTRAVENOUS at 14:11

## 2023-07-03 RX ADMIN — ARFORMOTEROL TARTRATE 15 MCG: 15 SOLUTION RESPIRATORY (INHALATION) at 16:54

## 2023-07-03 RX ADMIN — SODIUM CHLORIDE, PRESERVATIVE FREE 10 ML: 5 INJECTION INTRAVENOUS at 21:12

## 2023-07-03 RX ADMIN — Medication 100 MG: at 08:13

## 2023-07-03 RX ADMIN — ASPIRIN 81 MG: 81 TABLET, COATED ORAL at 08:10

## 2023-07-03 RX ADMIN — FUROSEMIDE 40 MG: 10 INJECTION, SOLUTION INTRAMUSCULAR; INTRAVENOUS at 17:22

## 2023-07-03 RX ADMIN — POTASSIUM CHLORIDE 40 MEQ: 1500 TABLET, EXTENDED RELEASE ORAL at 21:10

## 2023-07-03 RX ADMIN — QUETIAPINE FUMARATE 50 MG: 25 TABLET ORAL at 21:10

## 2023-07-03 RX ADMIN — FUROSEMIDE 40 MG: 10 INJECTION, SOLUTION INTRAMUSCULAR; INTRAVENOUS at 12:59

## 2023-07-03 RX ADMIN — SODIUM CHLORIDE, PRESERVATIVE FREE 10 ML: 5 INJECTION INTRAVENOUS at 08:12

## 2023-07-03 RX ADMIN — PHENOBARBITAL SODIUM 65 MG: 65 INJECTION INTRAMUSCULAR at 12:59

## 2023-07-03 ASSESSMENT — PAIN SCALES - GENERAL: PAINLEVEL_OUTOF10: 0

## 2023-07-04 LAB
ALBUMIN SERPL-MCNC: 3.3 G/DL (ref 3.5–5.2)
ALP SERPL-CCNC: 112 U/L (ref 40–129)
ALT SERPL-CCNC: 45 U/L (ref 0–40)
ANION GAP SERPL CALCULATED.3IONS-SCNC: 13 MMOL/L (ref 7–16)
AST SERPL-CCNC: 67 U/L (ref 0–39)
BILIRUB SERPL-MCNC: 0.5 MG/DL (ref 0–1.2)
BUN SERPL-MCNC: 18 MG/DL (ref 6–23)
CALCIUM SERPL-MCNC: 9.2 MG/DL (ref 8.6–10.2)
CHLORIDE SERPL-SCNC: 94 MMOL/L (ref 98–107)
CO2 SERPL-SCNC: 27 MMOL/L (ref 22–29)
CREAT SERPL-MCNC: 0.9 MG/DL (ref 0.7–1.2)
ERYTHROCYTE [DISTWIDTH] IN BLOOD BY AUTOMATED COUNT: 15 FL (ref 11.5–15)
GLUCOSE SERPL-MCNC: 127 MG/DL (ref 74–99)
HCT VFR BLD AUTO: 43.9 % (ref 37–54)
HGB BLD-MCNC: 14.6 G/DL (ref 12.5–16.5)
MAGNESIUM SERPL-MCNC: 1.5 MG/DL (ref 1.6–2.6)
MCH RBC QN AUTO: 35.8 PG (ref 26–35)
MCHC RBC AUTO-ENTMCNC: 33.3 % (ref 32–34.5)
MCV RBC AUTO: 107.6 FL (ref 80–99.9)
PHOSPHATE SERPL-MCNC: 3.1 MG/DL (ref 2.5–4.5)
PLATELET # BLD AUTO: 59 E9/L (ref 130–450)
PLATELET CONFIRMATION: NORMAL
PMV BLD AUTO: 11.5 FL (ref 7–12)
POTASSIUM SERPL-SCNC: 3.8 MMOL/L (ref 3.5–5)
PROT SERPL-MCNC: 6.5 G/DL (ref 6.4–8.3)
RBC # BLD AUTO: 4.08 E12/L (ref 3.8–5.8)
SODIUM SERPL-SCNC: 134 MMOL/L (ref 132–146)
WBC # BLD: 4.7 E9/L (ref 4.5–11.5)

## 2023-07-04 PROCEDURE — 6370000000 HC RX 637 (ALT 250 FOR IP): Performed by: INTERNAL MEDICINE

## 2023-07-04 PROCEDURE — 83735 ASSAY OF MAGNESIUM: CPT

## 2023-07-04 PROCEDURE — 94640 AIRWAY INHALATION TREATMENT: CPT

## 2023-07-04 PROCEDURE — 6360000002 HC RX W HCPCS: Performed by: INTERNAL MEDICINE

## 2023-07-04 PROCEDURE — 2580000003 HC RX 258: Performed by: INTERNAL MEDICINE

## 2023-07-04 PROCEDURE — 99233 SBSQ HOSP IP/OBS HIGH 50: CPT | Performed by: INTERNAL MEDICINE

## 2023-07-04 PROCEDURE — 6370000000 HC RX 637 (ALT 250 FOR IP): Performed by: EMERGENCY MEDICINE

## 2023-07-04 PROCEDURE — 84100 ASSAY OF PHOSPHORUS: CPT

## 2023-07-04 PROCEDURE — 85027 COMPLETE CBC AUTOMATED: CPT

## 2023-07-04 PROCEDURE — 2060000000 HC ICU INTERMEDIATE R&B

## 2023-07-04 PROCEDURE — 80053 COMPREHEN METABOLIC PANEL: CPT

## 2023-07-04 PROCEDURE — 36415 COLL VENOUS BLD VENIPUNCTURE: CPT

## 2023-07-04 PROCEDURE — 6360000002 HC RX W HCPCS: Performed by: CLINICAL NURSE SPECIALIST

## 2023-07-04 PROCEDURE — 6360000002 HC RX W HCPCS: Performed by: EMERGENCY MEDICINE

## 2023-07-04 RX ORDER — TRAZODONE HYDROCHLORIDE 50 MG/1
50 TABLET ORAL NIGHTLY PRN
Status: DISCONTINUED | OUTPATIENT
Start: 2023-07-04 | End: 2023-07-06

## 2023-07-04 RX ORDER — POLYETHYLENE GLYCOL 3350 17 G
2 POWDER IN PACKET (EA) ORAL
Status: DISCONTINUED | OUTPATIENT
Start: 2023-07-04 | End: 2023-07-10 | Stop reason: HOSPADM

## 2023-07-04 RX ORDER — TRAZODONE HYDROCHLORIDE 50 MG/1
50 TABLET ORAL NIGHTLY
Status: DISCONTINUED | OUTPATIENT
Start: 2023-07-04 | End: 2023-07-04

## 2023-07-04 RX ORDER — METOPROLOL SUCCINATE 25 MG/1
25 TABLET, EXTENDED RELEASE ORAL 2 TIMES DAILY
Status: DISCONTINUED | OUTPATIENT
Start: 2023-07-04 | End: 2023-07-10 | Stop reason: HOSPADM

## 2023-07-04 RX ORDER — MECOBALAMIN 5000 MCG
5 TABLET,DISINTEGRATING ORAL NIGHTLY
Status: DISCONTINUED | OUTPATIENT
Start: 2023-07-04 | End: 2023-07-04

## 2023-07-04 RX ORDER — MAGNESIUM SULFATE IN WATER 40 MG/ML
4000 INJECTION, SOLUTION INTRAVENOUS ONCE
Status: COMPLETED | OUTPATIENT
Start: 2023-07-04 | End: 2023-07-04

## 2023-07-04 RX ORDER — MECOBALAMIN 5000 MCG
10 TABLET,DISINTEGRATING ORAL NIGHTLY
Status: DISCONTINUED | OUTPATIENT
Start: 2023-07-04 | End: 2023-07-06

## 2023-07-04 RX ADMIN — SODIUM CHLORIDE, PRESERVATIVE FREE 10 ML: 5 INJECTION INTRAVENOUS at 20:26

## 2023-07-04 RX ADMIN — LORAZEPAM 2 MG: 1 TABLET ORAL at 10:01

## 2023-07-04 RX ADMIN — MAGNESIUM SULFATE HEPTAHYDRATE 4000 MG: 40 INJECTION, SOLUTION INTRAVENOUS at 08:17

## 2023-07-04 RX ADMIN — BUDESONIDE 250 MCG: 0.25 SUSPENSION RESPIRATORY (INHALATION) at 18:12

## 2023-07-04 RX ADMIN — PHENOBARBITAL SODIUM 32.5 MG: 65 INJECTION INTRAMUSCULAR at 20:25

## 2023-07-04 RX ADMIN — QUETIAPINE FUMARATE 50 MG: 25 TABLET ORAL at 20:26

## 2023-07-04 RX ADMIN — ARFORMOTEROL TARTRATE 15 MCG: 15 SOLUTION RESPIRATORY (INHALATION) at 05:02

## 2023-07-04 RX ADMIN — SODIUM CHLORIDE, PRESERVATIVE FREE 10 ML: 5 INJECTION INTRAVENOUS at 08:11

## 2023-07-04 RX ADMIN — THIAMINE HYDROCHLORIDE 100 MG: 100 INJECTION, SOLUTION INTRAMUSCULAR; INTRAVENOUS at 08:06

## 2023-07-04 RX ADMIN — FUROSEMIDE 40 MG: 10 INJECTION, SOLUTION INTRAMUSCULAR; INTRAVENOUS at 16:54

## 2023-07-04 RX ADMIN — APIXABAN 2.5 MG: 2.5 TABLET, FILM COATED ORAL at 20:26

## 2023-07-04 RX ADMIN — METOPROLOL SUCCINATE 25 MG: 25 TABLET, EXTENDED RELEASE ORAL at 10:01

## 2023-07-04 RX ADMIN — MAGNESIUM 64 MG (MAGNESIUM CHLORIDE) TABLET,DELAYED RELEASE 128 MG: at 08:03

## 2023-07-04 RX ADMIN — PHENOBARBITAL SODIUM 32.5 MG: 65 INJECTION INTRAMUSCULAR at 08:10

## 2023-07-04 RX ADMIN — TRAZODONE HYDROCHLORIDE 50 MG: 50 TABLET ORAL at 20:26

## 2023-07-04 RX ADMIN — PHENOBARBITAL SODIUM 32.5 MG: 65 INJECTION INTRAMUSCULAR at 11:54

## 2023-07-04 RX ADMIN — LORAZEPAM 2 MG: 2 INJECTION INTRAMUSCULAR; INTRAVENOUS at 11:15

## 2023-07-04 RX ADMIN — LORAZEPAM 1 MG: 1 TABLET ORAL at 08:03

## 2023-07-04 RX ADMIN — PANTOPRAZOLE SODIUM 40 MG: 40 TABLET, DELAYED RELEASE ORAL at 06:42

## 2023-07-04 RX ADMIN — PHENOBARBITAL SODIUM 32.5 MG: 65 INJECTION INTRAMUSCULAR at 16:54

## 2023-07-04 RX ADMIN — LORAZEPAM 2 MG: 1 TABLET ORAL at 20:26

## 2023-07-04 RX ADMIN — METOPROLOL SUCCINATE 25 MG: 25 TABLET, EXTENDED RELEASE ORAL at 20:26

## 2023-07-04 RX ADMIN — FUROSEMIDE 40 MG: 10 INJECTION, SOLUTION INTRAMUSCULAR; INTRAVENOUS at 08:05

## 2023-07-04 RX ADMIN — ASPIRIN 81 MG: 81 TABLET, COATED ORAL at 08:03

## 2023-07-04 RX ADMIN — LORAZEPAM 2 MG: 1 TABLET ORAL at 15:42

## 2023-07-04 RX ADMIN — ARFORMOTEROL TARTRATE 15 MCG: 15 SOLUTION RESPIRATORY (INHALATION) at 18:11

## 2023-07-04 RX ADMIN — ATORVASTATIN CALCIUM 40 MG: 40 TABLET, FILM COATED ORAL at 20:26

## 2023-07-04 RX ADMIN — BUDESONIDE 250 MCG: 0.25 SUSPENSION RESPIRATORY (INHALATION) at 05:02

## 2023-07-04 RX ADMIN — NICOTINE POLACRILEX 2 MG: 2 LOZENGE ORAL at 12:14

## 2023-07-04 RX ADMIN — Medication 10 MG: at 20:25

## 2023-07-05 LAB
ALBUMIN SERPL-MCNC: 3.6 G/DL (ref 3.5–5.2)
ALP SERPL-CCNC: 116 U/L (ref 40–129)
ALT SERPL-CCNC: 60 U/L (ref 0–40)
ANION GAP SERPL CALCULATED.3IONS-SCNC: 13 MMOL/L (ref 7–16)
AST SERPL-CCNC: 91 U/L (ref 0–39)
BILIRUB DIRECT SERPL-MCNC: <0.2 MG/DL (ref 0–0.3)
BILIRUB INDIRECT SERPL-MCNC: ABNORMAL MG/DL (ref 0–1)
BILIRUB SERPL-MCNC: 0.5 MG/DL (ref 0–1.2)
BUN SERPL-MCNC: 22 MG/DL (ref 6–23)
CALCIUM SERPL-MCNC: 9.2 MG/DL (ref 8.6–10.2)
CHLORIDE SERPL-SCNC: 95 MMOL/L (ref 98–107)
CO2 SERPL-SCNC: 30 MMOL/L (ref 22–29)
CREAT SERPL-MCNC: 1.1 MG/DL (ref 0.7–1.2)
ERYTHROCYTE [DISTWIDTH] IN BLOOD BY AUTOMATED COUNT: 14.7 FL (ref 11.5–15)
GLUCOSE SERPL-MCNC: 153 MG/DL (ref 74–99)
HCT VFR BLD AUTO: 41.8 % (ref 37–54)
HGB BLD-MCNC: 14.5 G/DL (ref 12.5–16.5)
MAGNESIUM SERPL-MCNC: 1.9 MG/DL (ref 1.6–2.6)
MCH RBC QN AUTO: 36.5 PG (ref 26–35)
MCHC RBC AUTO-ENTMCNC: 34.7 % (ref 32–34.5)
MCV RBC AUTO: 105.3 FL (ref 80–99.9)
PHOSPHATE SERPL-MCNC: 4 MG/DL (ref 2.5–4.5)
PLATELET # BLD AUTO: 83 E9/L (ref 130–450)
PLATELET CONFIRMATION: NORMAL
PMV BLD AUTO: 11.7 FL (ref 7–12)
POTASSIUM SERPL-SCNC: 3.5 MMOL/L (ref 3.5–5)
PROT SERPL-MCNC: 6.7 G/DL (ref 6.4–8.3)
RBC # BLD AUTO: 3.97 E12/L (ref 3.8–5.8)
SODIUM SERPL-SCNC: 138 MMOL/L (ref 132–146)
WBC # BLD: 5.2 E9/L (ref 4.5–11.5)

## 2023-07-05 PROCEDURE — 6360000002 HC RX W HCPCS: Performed by: INTERNAL MEDICINE

## 2023-07-05 PROCEDURE — 99233 SBSQ HOSP IP/OBS HIGH 50: CPT | Performed by: INTERNAL MEDICINE

## 2023-07-05 PROCEDURE — 6370000000 HC RX 637 (ALT 250 FOR IP): Performed by: EMERGENCY MEDICINE

## 2023-07-05 PROCEDURE — 80048 BASIC METABOLIC PNL TOTAL CA: CPT

## 2023-07-05 PROCEDURE — 6360000002 HC RX W HCPCS: Performed by: CLINICAL NURSE SPECIALIST

## 2023-07-05 PROCEDURE — 6370000000 HC RX 637 (ALT 250 FOR IP): Performed by: INTERNAL MEDICINE

## 2023-07-05 PROCEDURE — 80076 HEPATIC FUNCTION PANEL: CPT

## 2023-07-05 PROCEDURE — 2060000000 HC ICU INTERMEDIATE R&B

## 2023-07-05 PROCEDURE — 36415 COLL VENOUS BLD VENIPUNCTURE: CPT

## 2023-07-05 PROCEDURE — 84100 ASSAY OF PHOSPHORUS: CPT

## 2023-07-05 PROCEDURE — 85027 COMPLETE CBC AUTOMATED: CPT

## 2023-07-05 PROCEDURE — 6360000002 HC RX W HCPCS: Performed by: EMERGENCY MEDICINE

## 2023-07-05 PROCEDURE — 99232 SBSQ HOSP IP/OBS MODERATE 35: CPT | Performed by: INTERNAL MEDICINE

## 2023-07-05 PROCEDURE — 94640 AIRWAY INHALATION TREATMENT: CPT

## 2023-07-05 PROCEDURE — 83735 ASSAY OF MAGNESIUM: CPT

## 2023-07-05 PROCEDURE — 2580000003 HC RX 258: Performed by: EMERGENCY MEDICINE

## 2023-07-05 PROCEDURE — 2580000003 HC RX 258: Performed by: INTERNAL MEDICINE

## 2023-07-05 PROCEDURE — 2700000000 HC OXYGEN THERAPY PER DAY

## 2023-07-05 RX ORDER — DIGOXIN 0.25 MG/ML
125 INJECTION INTRAMUSCULAR; INTRAVENOUS ONCE
Status: COMPLETED | OUTPATIENT
Start: 2023-07-05 | End: 2023-07-05

## 2023-07-05 RX ADMIN — FUROSEMIDE 40 MG: 10 INJECTION, SOLUTION INTRAMUSCULAR; INTRAVENOUS at 08:13

## 2023-07-05 RX ADMIN — Medication 10 MG: at 20:45

## 2023-07-05 RX ADMIN — ARFORMOTEROL TARTRATE 15 MCG: 15 SOLUTION RESPIRATORY (INHALATION) at 04:37

## 2023-07-05 RX ADMIN — LORAZEPAM 3 MG: 1 TABLET ORAL at 13:01

## 2023-07-05 RX ADMIN — PHENOBARBITAL SODIUM 32.5 MG: 65 INJECTION INTRAMUSCULAR at 08:16

## 2023-07-05 RX ADMIN — PANTOPRAZOLE SODIUM 40 MG: 40 TABLET, DELAYED RELEASE ORAL at 05:53

## 2023-07-05 RX ADMIN — PHENOBARBITAL SODIUM 16.2 MG: 65 INJECTION INTRAMUSCULAR at 20:48

## 2023-07-05 RX ADMIN — BUDESONIDE 250 MCG: 0.25 SUSPENSION RESPIRATORY (INHALATION) at 17:19

## 2023-07-05 RX ADMIN — QUETIAPINE FUMARATE 50 MG: 25 TABLET ORAL at 20:45

## 2023-07-05 RX ADMIN — SODIUM CHLORIDE, PRESERVATIVE FREE 10 ML: 5 INJECTION INTRAVENOUS at 08:17

## 2023-07-05 RX ADMIN — MAGNESIUM 64 MG (MAGNESIUM CHLORIDE) TABLET,DELAYED RELEASE 128 MG: at 08:20

## 2023-07-05 RX ADMIN — THIAMINE HYDROCHLORIDE 100 MG: 100 INJECTION, SOLUTION INTRAMUSCULAR; INTRAVENOUS at 08:17

## 2023-07-05 RX ADMIN — METOPROLOL SUCCINATE 25 MG: 25 TABLET, EXTENDED RELEASE ORAL at 08:20

## 2023-07-05 RX ADMIN — LORAZEPAM 2 MG: 1 TABLET ORAL at 23:24

## 2023-07-05 RX ADMIN — ARFORMOTEROL TARTRATE 15 MCG: 15 SOLUTION RESPIRATORY (INHALATION) at 17:19

## 2023-07-05 RX ADMIN — ASPIRIN 81 MG: 81 TABLET, COATED ORAL at 08:20

## 2023-07-05 RX ADMIN — ATORVASTATIN CALCIUM 40 MG: 40 TABLET, FILM COATED ORAL at 20:45

## 2023-07-05 RX ADMIN — SODIUM CHLORIDE, PRESERVATIVE FREE 10 ML: 5 INJECTION INTRAVENOUS at 20:47

## 2023-07-05 RX ADMIN — DIGOXIN 125 MCG: 0.25 INJECTION INTRAMUSCULAR; INTRAVENOUS at 17:11

## 2023-07-05 RX ADMIN — Medication 10 ML: at 08:23

## 2023-07-05 RX ADMIN — APIXABAN 2.5 MG: 2.5 TABLET, FILM COATED ORAL at 08:20

## 2023-07-05 RX ADMIN — METOPROLOL SUCCINATE 25 MG: 25 TABLET, EXTENDED RELEASE ORAL at 20:45

## 2023-07-05 RX ADMIN — LORAZEPAM 1 MG: 2 INJECTION INTRAMUSCULAR; INTRAVENOUS at 08:18

## 2023-07-05 RX ADMIN — BUDESONIDE 250 MCG: 0.25 SUSPENSION RESPIRATORY (INHALATION) at 04:37

## 2023-07-05 RX ADMIN — APIXABAN 2.5 MG: 2.5 TABLET, FILM COATED ORAL at 20:45

## 2023-07-05 ASSESSMENT — PAIN DESCRIPTION - DESCRIPTORS
DESCRIPTORS: ACHING
DESCRIPTORS: ACHING

## 2023-07-05 ASSESSMENT — PAIN SCALES - GENERAL
PAINLEVEL_OUTOF10: 5
PAINLEVEL_OUTOF10: 5

## 2023-07-05 ASSESSMENT — PAIN - FUNCTIONAL ASSESSMENT
PAIN_FUNCTIONAL_ASSESSMENT: ACTIVITIES ARE NOT PREVENTED
PAIN_FUNCTIONAL_ASSESSMENT: ACTIVITIES ARE NOT PREVENTED

## 2023-07-05 ASSESSMENT — PAIN DESCRIPTION - PAIN TYPE
TYPE: ACUTE PAIN
TYPE: ACUTE PAIN

## 2023-07-05 ASSESSMENT — PAIN DESCRIPTION - LOCATION
LOCATION: HEAD
LOCATION: HEAD

## 2023-07-05 ASSESSMENT — PAIN DESCRIPTION - ORIENTATION
ORIENTATION: POSTERIOR
ORIENTATION: POSTERIOR

## 2023-07-06 LAB
ANION GAP SERPL CALCULATED.3IONS-SCNC: 12 MMOL/L (ref 7–16)
BUN SERPL-MCNC: 21 MG/DL (ref 6–23)
CALCIUM SERPL-MCNC: 9.4 MG/DL (ref 8.6–10.2)
CHLORIDE SERPL-SCNC: 92 MMOL/L (ref 98–107)
CO2 SERPL-SCNC: 30 MMOL/L (ref 22–29)
CREAT SERPL-MCNC: 1.2 MG/DL (ref 0.7–1.2)
ERYTHROCYTE [DISTWIDTH] IN BLOOD BY AUTOMATED COUNT: 15 FL (ref 11.5–15)
GLUCOSE SERPL-MCNC: 215 MG/DL (ref 74–99)
HCT VFR BLD AUTO: 43.6 % (ref 37–54)
HGB BLD-MCNC: 14.6 G/DL (ref 12.5–16.5)
MAGNESIUM SERPL-MCNC: 1.6 MG/DL (ref 1.6–2.6)
MCH RBC QN AUTO: 35.4 PG (ref 26–35)
MCHC RBC AUTO-ENTMCNC: 33.5 % (ref 32–34.5)
MCV RBC AUTO: 105.6 FL (ref 80–99.9)
PHOSPHATE SERPL-MCNC: 4 MG/DL (ref 2.5–4.5)
PLATELET # BLD AUTO: 123 E9/L (ref 130–450)
PMV BLD AUTO: 11.9 FL (ref 7–12)
POTASSIUM SERPL-SCNC: 3.6 MMOL/L (ref 3.5–5)
RBC # BLD AUTO: 4.13 E12/L (ref 3.8–5.8)
SODIUM SERPL-SCNC: 134 MMOL/L (ref 132–146)
WBC # BLD: 7 E9/L (ref 4.5–11.5)

## 2023-07-06 PROCEDURE — 99232 SBSQ HOSP IP/OBS MODERATE 35: CPT | Performed by: INTERNAL MEDICINE

## 2023-07-06 PROCEDURE — 6360000002 HC RX W HCPCS: Performed by: INTERNAL MEDICINE

## 2023-07-06 PROCEDURE — 2580000003 HC RX 258: Performed by: EMERGENCY MEDICINE

## 2023-07-06 PROCEDURE — 6370000000 HC RX 637 (ALT 250 FOR IP): Performed by: INTERNAL MEDICINE

## 2023-07-06 PROCEDURE — 83735 ASSAY OF MAGNESIUM: CPT

## 2023-07-06 PROCEDURE — 97161 PT EVAL LOW COMPLEX 20 MIN: CPT

## 2023-07-06 PROCEDURE — 85027 COMPLETE CBC AUTOMATED: CPT

## 2023-07-06 PROCEDURE — 97530 THERAPEUTIC ACTIVITIES: CPT

## 2023-07-06 PROCEDURE — 80048 BASIC METABOLIC PNL TOTAL CA: CPT

## 2023-07-06 PROCEDURE — 2060000000 HC ICU INTERMEDIATE R&B

## 2023-07-06 PROCEDURE — 36415 COLL VENOUS BLD VENIPUNCTURE: CPT

## 2023-07-06 PROCEDURE — 97165 OT EVAL LOW COMPLEX 30 MIN: CPT

## 2023-07-06 PROCEDURE — 94640 AIRWAY INHALATION TREATMENT: CPT

## 2023-07-06 PROCEDURE — 97535 SELF CARE MNGMENT TRAINING: CPT

## 2023-07-06 PROCEDURE — 2580000003 HC RX 258: Performed by: INTERNAL MEDICINE

## 2023-07-06 PROCEDURE — 84100 ASSAY OF PHOSPHORUS: CPT

## 2023-07-06 RX ORDER — LORAZEPAM 1 MG/1
1 TABLET ORAL EVERY 6 HOURS PRN
Status: DISCONTINUED | OUTPATIENT
Start: 2023-07-06 | End: 2023-07-10 | Stop reason: HOSPADM

## 2023-07-06 RX ORDER — SODIUM CHLORIDE 9 MG/ML
INJECTION, SOLUTION INTRAVENOUS PRN
Status: CANCELLED | OUTPATIENT
Start: 2023-07-06

## 2023-07-06 RX ORDER — HYDROXYZINE PAMOATE 25 MG/1
50 CAPSULE ORAL NIGHTLY PRN
Status: DISCONTINUED | OUTPATIENT
Start: 2023-07-06 | End: 2023-07-10 | Stop reason: HOSPADM

## 2023-07-06 RX ORDER — TRAZODONE HYDROCHLORIDE 50 MG/1
100 TABLET ORAL NIGHTLY PRN
Status: DISCONTINUED | OUTPATIENT
Start: 2023-07-06 | End: 2023-07-10 | Stop reason: HOSPADM

## 2023-07-06 RX ORDER — GAUZE BANDAGE 2" X 2"
100 BANDAGE TOPICAL DAILY
Status: DISCONTINUED | OUTPATIENT
Start: 2023-07-07 | End: 2023-07-10 | Stop reason: HOSPADM

## 2023-07-06 RX ORDER — SODIUM CHLORIDE 0.9 % (FLUSH) 0.9 %
5-40 SYRINGE (ML) INJECTION PRN
Status: CANCELLED | OUTPATIENT
Start: 2023-07-06

## 2023-07-06 RX ADMIN — METOPROLOL SUCCINATE 25 MG: 25 TABLET, EXTENDED RELEASE ORAL at 09:16

## 2023-07-06 RX ADMIN — Medication 10 ML: at 09:18

## 2023-07-06 RX ADMIN — THIAMINE HYDROCHLORIDE 100 MG: 100 INJECTION, SOLUTION INTRAMUSCULAR; INTRAVENOUS at 09:08

## 2023-07-06 RX ADMIN — ARFORMOTEROL TARTRATE 15 MCG: 15 SOLUTION RESPIRATORY (INHALATION) at 06:53

## 2023-07-06 RX ADMIN — ATORVASTATIN CALCIUM 40 MG: 40 TABLET, FILM COATED ORAL at 21:15

## 2023-07-06 RX ADMIN — BUDESONIDE 250 MCG: 0.25 SUSPENSION RESPIRATORY (INHALATION) at 18:03

## 2023-07-06 RX ADMIN — ASPIRIN 81 MG: 81 TABLET, COATED ORAL at 09:08

## 2023-07-06 RX ADMIN — QUETIAPINE FUMARATE 50 MG: 25 TABLET ORAL at 21:15

## 2023-07-06 RX ADMIN — APIXABAN 2.5 MG: 2.5 TABLET, FILM COATED ORAL at 09:08

## 2023-07-06 RX ADMIN — APIXABAN 2.5 MG: 2.5 TABLET, FILM COATED ORAL at 21:15

## 2023-07-06 RX ADMIN — ACETAMINOPHEN 650 MG: 325 TABLET ORAL at 09:16

## 2023-07-06 RX ADMIN — BUDESONIDE 250 MCG: 0.25 SUSPENSION RESPIRATORY (INHALATION) at 06:53

## 2023-07-06 RX ADMIN — MAGNESIUM 64 MG (MAGNESIUM CHLORIDE) TABLET,DELAYED RELEASE 128 MG: at 09:16

## 2023-07-06 RX ADMIN — SODIUM CHLORIDE, PRESERVATIVE FREE 10 ML: 5 INJECTION INTRAVENOUS at 21:17

## 2023-07-06 RX ADMIN — CARBAMIDE PEROXIDE 6.5% 5 DROP: 6.5 LIQUID AURICULAR (OTIC) at 21:21

## 2023-07-06 RX ADMIN — SODIUM CHLORIDE, PRESERVATIVE FREE 10 ML: 5 INJECTION INTRAVENOUS at 09:17

## 2023-07-06 RX ADMIN — METOPROLOL SUCCINATE 25 MG: 25 TABLET, EXTENDED RELEASE ORAL at 21:15

## 2023-07-06 RX ADMIN — LORAZEPAM 1 MG: 1 TABLET ORAL at 21:20

## 2023-07-06 RX ADMIN — PANTOPRAZOLE SODIUM 40 MG: 40 TABLET, DELAYED RELEASE ORAL at 06:30

## 2023-07-06 RX ADMIN — PHENOBARBITAL SODIUM 16.2 MG: 65 INJECTION INTRAMUSCULAR at 09:09

## 2023-07-06 RX ADMIN — CARBAMIDE PEROXIDE 6.5% 5 DROP: 6.5 LIQUID AURICULAR (OTIC) at 13:03

## 2023-07-06 RX ADMIN — ARFORMOTEROL TARTRATE 15 MCG: 15 SOLUTION RESPIRATORY (INHALATION) at 18:03

## 2023-07-06 ASSESSMENT — PAIN DESCRIPTION - LOCATION: LOCATION: HEAD

## 2023-07-06 ASSESSMENT — PAIN SCALES - GENERAL
PAINLEVEL_OUTOF10: 0
PAINLEVEL_OUTOF10: 5
PAINLEVEL_OUTOF10: 0
PAINLEVEL_OUTOF10: 0

## 2023-07-07 ENCOUNTER — ANESTHESIA (OUTPATIENT)
Dept: POSTOP/PACU | Age: 63
End: 2023-07-07
Payer: COMMERCIAL

## 2023-07-07 ENCOUNTER — ANESTHESIA EVENT (OUTPATIENT)
Dept: POSTOP/PACU | Age: 63
End: 2023-07-07
Payer: COMMERCIAL

## 2023-07-07 ENCOUNTER — HOSPITAL ENCOUNTER (INPATIENT)
Dept: POSTOP/PACU | Age: 63
Discharge: HOME | DRG: 201 | End: 2023-07-07
Payer: COMMERCIAL

## 2023-07-07 VITALS
RESPIRATION RATE: 14 BRPM | TEMPERATURE: 97.4 F | OXYGEN SATURATION: 96 % | DIASTOLIC BLOOD PRESSURE: 72 MMHG | HEART RATE: 88 BPM | SYSTOLIC BLOOD PRESSURE: 116 MMHG

## 2023-07-07 LAB
LV EF: 55 %
LVEF MODALITY: NORMAL

## 2023-07-07 PROCEDURE — 6370000000 HC RX 637 (ALT 250 FOR IP): Performed by: INTERNAL MEDICINE

## 2023-07-07 PROCEDURE — 2580000003 HC RX 258: Performed by: EMERGENCY MEDICINE

## 2023-07-07 PROCEDURE — 2060000000 HC ICU INTERMEDIATE R&B

## 2023-07-07 PROCEDURE — 3700000000 HC ANESTHESIA ATTENDED CARE

## 2023-07-07 PROCEDURE — 7100000000 HC PACU RECOVERY - FIRST 15 MIN

## 2023-07-07 PROCEDURE — 93312 ECHO TRANSESOPHAGEAL: CPT | Performed by: INTERNAL MEDICINE

## 2023-07-07 PROCEDURE — 6360000002 HC RX W HCPCS: Performed by: INTERNAL MEDICINE

## 2023-07-07 PROCEDURE — 7100000001 HC PACU RECOVERY - ADDTL 15 MIN

## 2023-07-07 PROCEDURE — 94640 AIRWAY INHALATION TREATMENT: CPT

## 2023-07-07 PROCEDURE — 93320 DOPPLER ECHO COMPLETE: CPT | Performed by: INTERNAL MEDICINE

## 2023-07-07 PROCEDURE — 99233 SBSQ HOSP IP/OBS HIGH 50: CPT | Performed by: INTERNAL MEDICINE

## 2023-07-07 PROCEDURE — 2580000003 HC RX 258: Performed by: INTERNAL MEDICINE

## 2023-07-07 PROCEDURE — 93325 DOPPLER ECHO COLOR FLOW MAPG: CPT | Performed by: INTERNAL MEDICINE

## 2023-07-07 PROCEDURE — 93005 ELECTROCARDIOGRAM TRACING: CPT | Performed by: INTERNAL MEDICINE

## 2023-07-07 PROCEDURE — B24BZZ4 ULTRASONOGRAPHY OF HEART WITH AORTA, TRANSESOPHAGEAL: ICD-10-PCS | Performed by: INTERNAL MEDICINE

## 2023-07-07 PROCEDURE — 92960 CARDIOVERSION ELECTRIC EXT: CPT | Performed by: INTERNAL MEDICINE

## 2023-07-07 PROCEDURE — 93325 DOPPLER ECHO COLOR FLOW MAPG: CPT

## 2023-07-07 PROCEDURE — 6360000002 HC RX W HCPCS: Performed by: NURSE ANESTHETIST, CERTIFIED REGISTERED

## 2023-07-07 PROCEDURE — 92960 CARDIOVERSION ELECTRIC EXT: CPT

## 2023-07-07 PROCEDURE — 93312 ECHO TRANSESOPHAGEAL: CPT

## 2023-07-07 PROCEDURE — 93320 DOPPLER ECHO COMPLETE: CPT

## 2023-07-07 PROCEDURE — 99232 SBSQ HOSP IP/OBS MODERATE 35: CPT | Performed by: INTERNAL MEDICINE

## 2023-07-07 PROCEDURE — 3700000001 HC ADD 15 MINUTES (ANESTHESIA)

## 2023-07-07 RX ORDER — PROPOFOL 10 MG/ML
INJECTION, EMULSION INTRAVENOUS PRN
Status: DISCONTINUED | OUTPATIENT
Start: 2023-07-07 | End: 2023-07-07 | Stop reason: SDUPTHER

## 2023-07-07 RX ORDER — SODIUM CHLORIDE 9 MG/ML
INJECTION, SOLUTION INTRAVENOUS PRN
Status: DISCONTINUED | OUTPATIENT
Start: 2023-07-07 | End: 2023-07-08 | Stop reason: HOSPADM

## 2023-07-07 RX ORDER — SODIUM CHLORIDE 0.9 % (FLUSH) 0.9 %
5-40 SYRINGE (ML) INJECTION PRN
Status: DISCONTINUED | OUTPATIENT
Start: 2023-07-07 | End: 2023-07-08 | Stop reason: HOSPADM

## 2023-07-07 RX ADMIN — SODIUM CHLORIDE, PRESERVATIVE FREE 10 ML: 5 INJECTION INTRAVENOUS at 08:09

## 2023-07-07 RX ADMIN — BUDESONIDE 250 MCG: 0.25 SUSPENSION RESPIRATORY (INHALATION) at 17:59

## 2023-07-07 RX ADMIN — SODIUM CHLORIDE: 900 INJECTION, SOLUTION INTRAVENOUS at 13:05

## 2023-07-07 RX ADMIN — PANTOPRAZOLE SODIUM 40 MG: 40 TABLET, DELAYED RELEASE ORAL at 08:11

## 2023-07-07 RX ADMIN — LORAZEPAM 1 MG: 1 TABLET ORAL at 20:34

## 2023-07-07 RX ADMIN — CARBAMIDE PEROXIDE 6.5% 5 DROP: 6.5 LIQUID AURICULAR (OTIC) at 20:26

## 2023-07-07 RX ADMIN — METOPROLOL SUCCINATE 25 MG: 25 TABLET, EXTENDED RELEASE ORAL at 08:08

## 2023-07-07 RX ADMIN — ASPIRIN 81 MG: 81 TABLET, COATED ORAL at 08:08

## 2023-07-07 RX ADMIN — ACETAMINOPHEN 650 MG: 325 TABLET ORAL at 16:15

## 2023-07-07 RX ADMIN — APIXABAN 2.5 MG: 2.5 TABLET, FILM COATED ORAL at 20:26

## 2023-07-07 RX ADMIN — METOPROLOL SUCCINATE 25 MG: 25 TABLET, EXTENDED RELEASE ORAL at 20:26

## 2023-07-07 RX ADMIN — CARBAMIDE PEROXIDE 6.5% 5 DROP: 6.5 LIQUID AURICULAR (OTIC) at 14:29

## 2023-07-07 RX ADMIN — ARFORMOTEROL TARTRATE 15 MCG: 15 SOLUTION RESPIRATORY (INHALATION) at 07:11

## 2023-07-07 RX ADMIN — CARBAMIDE PEROXIDE 6.5% 5 DROP: 6.5 LIQUID AURICULAR (OTIC) at 08:07

## 2023-07-07 RX ADMIN — QUETIAPINE FUMARATE 50 MG: 25 TABLET ORAL at 20:26

## 2023-07-07 RX ADMIN — NICOTINE POLACRILEX 2 MG: 2 LOZENGE ORAL at 16:11

## 2023-07-07 RX ADMIN — SODIUM CHLORIDE, PRESERVATIVE FREE 10 ML: 5 INJECTION INTRAVENOUS at 20:27

## 2023-07-07 RX ADMIN — Medication 10 ML: at 20:27

## 2023-07-07 RX ADMIN — Medication 100 MG: at 08:08

## 2023-07-07 RX ADMIN — APIXABAN 2.5 MG: 2.5 TABLET, FILM COATED ORAL at 08:08

## 2023-07-07 RX ADMIN — BUDESONIDE 250 MCG: 0.25 SUSPENSION RESPIRATORY (INHALATION) at 07:11

## 2023-07-07 RX ADMIN — ARFORMOTEROL TARTRATE 15 MCG: 15 SOLUTION RESPIRATORY (INHALATION) at 17:59

## 2023-07-07 RX ADMIN — ATORVASTATIN CALCIUM 40 MG: 40 TABLET, FILM COATED ORAL at 20:26

## 2023-07-07 RX ADMIN — PROPOFOL 120 MCG/KG/MIN: 10 INJECTION, EMULSION INTRAVENOUS at 13:21

## 2023-07-07 RX ADMIN — PROPOFOL 70 MG: 10 INJECTION, EMULSION INTRAVENOUS at 13:20

## 2023-07-07 ASSESSMENT — LIFESTYLE VARIABLES: SMOKING_STATUS: 1

## 2023-07-07 ASSESSMENT — COPD QUESTIONNAIRES: CAT_SEVERITY: MODERATE

## 2023-07-07 ASSESSMENT — PAIN SCALES - GENERAL: PAINLEVEL_OUTOF10: 0

## 2023-07-07 NOTE — ANESTHESIA PRE PROCEDURE
Department of Anesthesiology  Preprocedure Note       Name:  Amaury Torres   Age:  61 y.o.  :  1960                                          MRN:  [id number]         Date:  2023      Surgeon: * No surgeons listed *    Procedure: * No procedures listed *    Medications prior to admission:   Prior to Admission medications    Medication Sig Start Date End Date Taking? Authorizing Provider   fluticasone (FLONASE) 50 MCG/ACT nasal spray 2 sprays by Each Nostril route daily 22   Ac Hendricks MD   acetaminophen (TYLENOL) 500 MG tablet Take 2 tablets by mouth 3 times daily 22   Ac Hendricks MD   aspirin 81 MG EC tablet Take 1 tablet by mouth in the morning. 22  Miryam Hancock MD   sertraline (ZOLOFT) 50 MG tablet Take 1 tablet by mouth in the morning. 22  Ac Hendricks MD   QUEtiapine (SEROQUEL) 25 MG tablet Take 2 tablets by mouth nightly 22  Ac Hendricks MD   SYMBICORT 80-4.5 MCG/ACT AERO Inhale 2 puffs into the lungs in the morning and 2 puffs before bedtime.  22  Ac Hendricks MD   albuterol sulfate HFA (PROAIR HFA) 108 (90 Base) MCG/ACT inhaler Inhale 2 puffs into the lungs every 6 hours as needed for Wheezing 22  Ac Hendricks MD   omeprazole (PRILOSEC) 40 MG delayed release capsule Take 1 capsule by mouth daily as needed (Reflux) 22  Ac Hendricks MD   nicotine (NICODERM CQ) 14 MG/24HR Place 1 patch onto the skin every 24 hours 22  Ac Hendricks MD   atorvastatin (LIPITOR) 40 MG tablet Take 1 tablet by mouth nightly 22  Ac Hendricks MD   mometasone-formoterol [facility]) 200-5 MCG/ACT inhaler Inhale 2 puffs into the lungs every 12 hours 22   JOSSIE Brito NP       Current medications:    Current Facility-Administered Medications   Medication Dose Route Frequency Provider Last Rate Last

## 2023-07-07 NOTE — ANESTHESIA POSTPROCEDURE EVALUATION
Department of Anesthesiology  Postprocedure Note    Patient: Sean Parrish  MRN: [id number]  YOB: 1960  Date of evaluation: 7/7/2023      Procedure Summary     Date: 07/07/23 Room / Location: [address] [place] [address] PACU; [address] [place] [address] ECHO    Anesthesia Start: 8199 Anesthesia Stop: 1335    Procedure: SJWZ JORGE CARDIOVERSION Diagnosis:     Scheduled Providers:  Responsible Provider: Marcia Payne DO    Anesthesia Type: MAC ASA Status: 4          Anesthesia Type: No value filed.     Sekou Phase I: Sekou Score: 10    Sekou Phase II:        Anesthesia Post Evaluation    Patient location during evaluation: PACU  Patient participation: complete - patient participated  Level of consciousness: awake and alert  Airway patency: patent  Nausea & Vomiting: no nausea and no vomiting  Complications: no  Cardiovascular status: hemodynamically stable  Respiratory status: acceptable  Hydration status: euvolemic

## 2023-07-08 LAB
EKG ATRIAL RATE: 98 BPM
EKG P AXIS: 46 DEGREES
EKG P-R INTERVAL: 188 MS
EKG Q-T INTERVAL: 350 MS
EKG QRS DURATION: 76 MS
EKG QTC CALCULATION (BAZETT): 446 MS
EKG R AXIS: -39 DEGREES
EKG T AXIS: 14 DEGREES
EKG VENTRICULAR RATE: 98 BPM

## 2023-07-08 PROCEDURE — 6370000000 HC RX 637 (ALT 250 FOR IP): Performed by: INTERNAL MEDICINE

## 2023-07-08 PROCEDURE — 6360000002 HC RX W HCPCS: Performed by: INTERNAL MEDICINE

## 2023-07-08 PROCEDURE — 93010 ELECTROCARDIOGRAM REPORT: CPT | Performed by: INTERNAL MEDICINE

## 2023-07-08 PROCEDURE — 2580000003 HC RX 258: Performed by: INTERNAL MEDICINE

## 2023-07-08 PROCEDURE — 2060000000 HC ICU INTERMEDIATE R&B

## 2023-07-08 PROCEDURE — 94640 AIRWAY INHALATION TREATMENT: CPT

## 2023-07-08 PROCEDURE — 99232 SBSQ HOSP IP/OBS MODERATE 35: CPT | Performed by: INTERNAL MEDICINE

## 2023-07-08 PROCEDURE — 2580000003 HC RX 258: Performed by: EMERGENCY MEDICINE

## 2023-07-08 RX ADMIN — APIXABAN 2.5 MG: 2.5 TABLET, FILM COATED ORAL at 07:54

## 2023-07-08 RX ADMIN — ATORVASTATIN CALCIUM 40 MG: 40 TABLET, FILM COATED ORAL at 21:42

## 2023-07-08 RX ADMIN — BUDESONIDE 250 MCG: 0.25 SUSPENSION RESPIRATORY (INHALATION) at 18:17

## 2023-07-08 RX ADMIN — TRAZODONE HYDROCHLORIDE 100 MG: 50 TABLET ORAL at 21:41

## 2023-07-08 RX ADMIN — LORAZEPAM 1 MG: 1 TABLET ORAL at 21:42

## 2023-07-08 RX ADMIN — SODIUM CHLORIDE, PRESERVATIVE FREE 10 ML: 5 INJECTION INTRAVENOUS at 21:42

## 2023-07-08 RX ADMIN — QUETIAPINE FUMARATE 50 MG: 25 TABLET ORAL at 21:42

## 2023-07-08 RX ADMIN — TRAZODONE HYDROCHLORIDE 100 MG: 50 TABLET ORAL at 01:17

## 2023-07-08 RX ADMIN — Medication 10 ML: at 21:42

## 2023-07-08 RX ADMIN — ARFORMOTEROL TARTRATE 15 MCG: 15 SOLUTION RESPIRATORY (INHALATION) at 18:17

## 2023-07-08 RX ADMIN — ARFORMOTEROL TARTRATE 15 MCG: 15 SOLUTION RESPIRATORY (INHALATION) at 06:23

## 2023-07-08 RX ADMIN — CARBAMIDE PEROXIDE 6.5% 5 DROP: 6.5 LIQUID AURICULAR (OTIC) at 14:20

## 2023-07-08 RX ADMIN — CARBAMIDE PEROXIDE 6.5% 5 DROP: 6.5 LIQUID AURICULAR (OTIC) at 07:56

## 2023-07-08 RX ADMIN — ASPIRIN 81 MG: 81 TABLET, COATED ORAL at 07:54

## 2023-07-08 RX ADMIN — BUDESONIDE 250 MCG: 0.25 SUSPENSION RESPIRATORY (INHALATION) at 06:23

## 2023-07-08 RX ADMIN — APIXABAN 2.5 MG: 2.5 TABLET, FILM COATED ORAL at 21:42

## 2023-07-08 RX ADMIN — LORAZEPAM 1 MG: 1 TABLET ORAL at 13:18

## 2023-07-08 RX ADMIN — PANTOPRAZOLE SODIUM 40 MG: 40 TABLET, DELAYED RELEASE ORAL at 05:00

## 2023-07-08 RX ADMIN — CARBAMIDE PEROXIDE 6.5% 5 DROP: 6.5 LIQUID AURICULAR (OTIC) at 21:42

## 2023-07-08 RX ADMIN — METOPROLOL SUCCINATE 25 MG: 25 TABLET, EXTENDED RELEASE ORAL at 21:42

## 2023-07-08 RX ADMIN — Medication 100 MG: at 07:54

## 2023-07-08 RX ADMIN — METOPROLOL SUCCINATE 25 MG: 25 TABLET, EXTENDED RELEASE ORAL at 07:54

## 2023-07-08 RX ADMIN — SODIUM CHLORIDE, PRESERVATIVE FREE 10 ML: 5 INJECTION INTRAVENOUS at 07:55

## 2023-07-09 PROCEDURE — 6370000000 HC RX 637 (ALT 250 FOR IP): Performed by: INTERNAL MEDICINE

## 2023-07-09 PROCEDURE — 2700000000 HC OXYGEN THERAPY PER DAY

## 2023-07-09 PROCEDURE — 2060000000 HC ICU INTERMEDIATE R&B

## 2023-07-09 PROCEDURE — 97110 THERAPEUTIC EXERCISES: CPT

## 2023-07-09 PROCEDURE — 2580000003 HC RX 258: Performed by: EMERGENCY MEDICINE

## 2023-07-09 PROCEDURE — 99232 SBSQ HOSP IP/OBS MODERATE 35: CPT | Performed by: INTERNAL MEDICINE

## 2023-07-09 PROCEDURE — 6360000002 HC RX W HCPCS: Performed by: INTERNAL MEDICINE

## 2023-07-09 PROCEDURE — 97530 THERAPEUTIC ACTIVITIES: CPT

## 2023-07-09 PROCEDURE — 2580000003 HC RX 258: Performed by: INTERNAL MEDICINE

## 2023-07-09 PROCEDURE — 94640 AIRWAY INHALATION TREATMENT: CPT

## 2023-07-09 RX ADMIN — SODIUM CHLORIDE, PRESERVATIVE FREE 10 ML: 5 INJECTION INTRAVENOUS at 20:16

## 2023-07-09 RX ADMIN — ACETAMINOPHEN 650 MG: 325 TABLET ORAL at 11:02

## 2023-07-09 RX ADMIN — SODIUM CHLORIDE, PRESERVATIVE FREE 10 ML: 5 INJECTION INTRAVENOUS at 08:05

## 2023-07-09 RX ADMIN — PANTOPRAZOLE SODIUM 40 MG: 40 TABLET, DELAYED RELEASE ORAL at 05:23

## 2023-07-09 RX ADMIN — METOPROLOL SUCCINATE 25 MG: 25 TABLET, EXTENDED RELEASE ORAL at 08:05

## 2023-07-09 RX ADMIN — APIXABAN 2.5 MG: 2.5 TABLET, FILM COATED ORAL at 20:15

## 2023-07-09 RX ADMIN — ACETAMINOPHEN 650 MG: 325 TABLET ORAL at 18:34

## 2023-07-09 RX ADMIN — CARBAMIDE PEROXIDE 6.5% 5 DROP: 6.5 LIQUID AURICULAR (OTIC) at 08:05

## 2023-07-09 RX ADMIN — BUDESONIDE 250 MCG: 0.25 SUSPENSION RESPIRATORY (INHALATION) at 06:01

## 2023-07-09 RX ADMIN — ATORVASTATIN CALCIUM 40 MG: 40 TABLET, FILM COATED ORAL at 20:15

## 2023-07-09 RX ADMIN — LORAZEPAM 1 MG: 1 TABLET ORAL at 23:53

## 2023-07-09 RX ADMIN — BUDESONIDE 250 MCG: 0.25 SUSPENSION RESPIRATORY (INHALATION) at 19:19

## 2023-07-09 RX ADMIN — HYDROXYZINE PAMOATE 50 MG: 25 CAPSULE ORAL at 20:14

## 2023-07-09 RX ADMIN — METOPROLOL SUCCINATE 25 MG: 25 TABLET, EXTENDED RELEASE ORAL at 20:15

## 2023-07-09 RX ADMIN — LORAZEPAM 1 MG: 1 TABLET ORAL at 15:00

## 2023-07-09 RX ADMIN — ARFORMOTEROL TARTRATE 15 MCG: 15 SOLUTION RESPIRATORY (INHALATION) at 06:00

## 2023-07-09 RX ADMIN — APIXABAN 2.5 MG: 2.5 TABLET, FILM COATED ORAL at 08:05

## 2023-07-09 RX ADMIN — CARBAMIDE PEROXIDE 6.5% 5 DROP: 6.5 LIQUID AURICULAR (OTIC) at 14:39

## 2023-07-09 RX ADMIN — ARFORMOTEROL TARTRATE 15 MCG: 15 SOLUTION RESPIRATORY (INHALATION) at 19:19

## 2023-07-09 RX ADMIN — LORAZEPAM 1 MG: 1 TABLET ORAL at 08:18

## 2023-07-09 RX ADMIN — CARBAMIDE PEROXIDE 6.5% 5 DROP: 6.5 LIQUID AURICULAR (OTIC) at 20:14

## 2023-07-09 RX ADMIN — ASPIRIN 81 MG: 81 TABLET, COATED ORAL at 08:05

## 2023-07-09 RX ADMIN — QUETIAPINE FUMARATE 50 MG: 25 TABLET ORAL at 20:15

## 2023-07-09 RX ADMIN — Medication 10 ML: at 20:16

## 2023-07-09 RX ADMIN — Medication 100 MG: at 08:08

## 2023-07-10 VITALS
HEIGHT: 71 IN | TEMPERATURE: 98 F | HEART RATE: 71 BPM | DIASTOLIC BLOOD PRESSURE: 71 MMHG | RESPIRATION RATE: 16 BRPM | SYSTOLIC BLOOD PRESSURE: 120 MMHG | BODY MASS INDEX: 33.62 KG/M2 | OXYGEN SATURATION: 97 % | WEIGHT: 240.13 LBS

## 2023-07-10 LAB
ERYTHROCYTE [DISTWIDTH] IN BLOOD BY AUTOMATED COUNT: 14.6 FL (ref 11.5–15)
HCT VFR BLD AUTO: 38.9 % (ref 37–54)
HGB BLD-MCNC: 12.9 G/DL (ref 12.5–16.5)
MCH RBC QN AUTO: 35.8 PG (ref 26–35)
MCHC RBC AUTO-ENTMCNC: 33.2 % (ref 32–34.5)
MCV RBC AUTO: 108.1 FL (ref 80–99.9)
PLATELET # BLD AUTO: 152 E9/L (ref 130–450)
PMV BLD AUTO: 10.9 FL (ref 7–12)
RBC # BLD AUTO: 3.6 E12/L (ref 3.8–5.8)
WBC # BLD: 4.9 E9/L (ref 4.5–11.5)

## 2023-07-10 PROCEDURE — 99232 SBSQ HOSP IP/OBS MODERATE 35: CPT | Performed by: INTERNAL MEDICINE

## 2023-07-10 PROCEDURE — 85027 COMPLETE CBC AUTOMATED: CPT

## 2023-07-10 PROCEDURE — 2580000003 HC RX 258: Performed by: INTERNAL MEDICINE

## 2023-07-10 PROCEDURE — 6360000002 HC RX W HCPCS: Performed by: INTERNAL MEDICINE

## 2023-07-10 PROCEDURE — 6370000000 HC RX 637 (ALT 250 FOR IP): Performed by: INTERNAL MEDICINE

## 2023-07-10 PROCEDURE — 36415 COLL VENOUS BLD VENIPUNCTURE: CPT

## 2023-07-10 PROCEDURE — 97530 THERAPEUTIC ACTIVITIES: CPT

## 2023-07-10 PROCEDURE — 2580000003 HC RX 258: Performed by: EMERGENCY MEDICINE

## 2023-07-10 PROCEDURE — 97535 SELF CARE MNGMENT TRAINING: CPT

## 2023-07-10 PROCEDURE — 94640 AIRWAY INHALATION TREATMENT: CPT

## 2023-07-10 RX ORDER — THIAMINE MONONITRATE (VIT B1) 100 MG
100 TABLET ORAL DAILY
Refills: 0 | DISCHARGE
Start: 2023-07-11

## 2023-07-10 RX ORDER — POLYETHYLENE GLYCOL 3350 17 G
2 POWDER IN PACKET (EA) ORAL
Qty: 100 EACH | Refills: 3 | DISCHARGE
Start: 2023-07-10

## 2023-07-10 RX ORDER — LORAZEPAM 1 MG/1
1 TABLET ORAL EVERY 8 HOURS PRN
Qty: 9 TABLET | Refills: 0 | Status: SHIPPED | OUTPATIENT
Start: 2023-07-10 | End: 2023-07-13

## 2023-07-10 RX ORDER — POLYETHYLENE GLYCOL 3350 17 G/17G
17 POWDER, FOR SOLUTION ORAL DAILY PRN
Qty: 527 G | Refills: 0 | DISCHARGE
Start: 2023-07-10 | End: 2023-08-09

## 2023-07-10 RX ORDER — TRAZODONE HYDROCHLORIDE 100 MG/1
100 TABLET ORAL NIGHTLY PRN
DISCHARGE
Start: 2023-07-10

## 2023-07-10 RX ORDER — HYDROXYZINE PAMOATE 50 MG/1
50 CAPSULE ORAL NIGHTLY PRN
DISCHARGE
Start: 2023-07-10 | End: 2023-07-24

## 2023-07-10 RX ORDER — METOPROLOL SUCCINATE 25 MG/1
25 TABLET, EXTENDED RELEASE ORAL 2 TIMES DAILY
Qty: 30 TABLET | Refills: 3 | DISCHARGE
Start: 2023-07-10

## 2023-07-10 RX ADMIN — LORAZEPAM 1 MG: 1 TABLET ORAL at 15:15

## 2023-07-10 RX ADMIN — SODIUM CHLORIDE, PRESERVATIVE FREE 10 ML: 5 INJECTION INTRAVENOUS at 08:58

## 2023-07-10 RX ADMIN — LORAZEPAM 1 MG: 1 TABLET ORAL at 09:02

## 2023-07-10 RX ADMIN — ARFORMOTEROL TARTRATE 15 MCG: 15 SOLUTION RESPIRATORY (INHALATION) at 05:10

## 2023-07-10 RX ADMIN — ARFORMOTEROL TARTRATE 15 MCG: 15 SOLUTION RESPIRATORY (INHALATION) at 18:08

## 2023-07-10 RX ADMIN — APIXABAN 2.5 MG: 2.5 TABLET, FILM COATED ORAL at 08:57

## 2023-07-10 RX ADMIN — Medication 100 MG: at 08:57

## 2023-07-10 RX ADMIN — PANTOPRAZOLE SODIUM 40 MG: 40 TABLET, DELAYED RELEASE ORAL at 05:39

## 2023-07-10 RX ADMIN — BUDESONIDE 250 MCG: 0.25 SUSPENSION RESPIRATORY (INHALATION) at 18:09

## 2023-07-10 RX ADMIN — NICOTINE POLACRILEX 2 MG: 2 LOZENGE ORAL at 19:37

## 2023-07-10 RX ADMIN — Medication 10 ML: at 08:58

## 2023-07-10 RX ADMIN — ASPIRIN 81 MG: 81 TABLET, COATED ORAL at 08:57

## 2023-07-10 RX ADMIN — METOPROLOL SUCCINATE 25 MG: 25 TABLET, EXTENDED RELEASE ORAL at 08:57

## 2023-07-10 RX ADMIN — BUDESONIDE 250 MCG: 0.25 SUSPENSION RESPIRATORY (INHALATION) at 05:10

## 2023-07-10 RX ADMIN — ACETAMINOPHEN 650 MG: 325 TABLET ORAL at 10:02

## 2023-07-10 ASSESSMENT — PAIN SCALES - GENERAL: PAINLEVEL_OUTOF10: 5

## 2023-07-10 ASSESSMENT — PAIN DESCRIPTION - DESCRIPTORS: DESCRIPTORS: ACHING;DISCOMFORT;DULL

## 2023-07-10 ASSESSMENT — PAIN DESCRIPTION - LOCATION: LOCATION: HEAD

## 2023-07-10 NOTE — PLAN OF CARE
Problem: Discharge Planning  Goal: Discharge to home or other facility with appropriate resources  Outcome: Progressing     Problem: Skin/Tissue Integrity  Goal: Absence of new skin breakdown  Description: 1. Monitor for areas of redness and/or skin breakdown  2. Assess vascular access sites hourly  3. Every 4-6 hours minimum:  Change oxygen saturation probe site  4. Every 4-6 hours:  If on nasal continuous positive airway pressure, respiratory therapy assess nares and determine need for appliance change or resting period.   Outcome: Progressing     Problem: Safety - Adult  Goal: Free from fall injury  Outcome: Progressing     Problem: Pain  Goal: Verbalizes/displays adequate comfort level or baseline comfort level  Outcome: Progressing     Problem: Nutrition Deficit:  Goal: Optimize nutritional status  Outcome: Progressing     Problem: ABCDS Injury Assessment  Goal: Absence of physical injury  Outcome: Progressing     Problem: Neurosensory - Adult  Goal: Achieves stable or improved neurological status  Outcome: Progressing     Problem: Neurosensory - Adult  Goal: Absence of seizures  Outcome: Progressing     Problem: Neurosensory - Adult  Goal: Remains free of injury related to seizures activity  Outcome: Progressing     Problem: Neurosensory - Adult  Goal: Achieves maximal functionality and self care  Outcome: Progressing     Problem: Respiratory - Adult  Goal: Achieves optimal ventilation and oxygenation  Outcome: Progressing     Problem: Cardiovascular - Adult  Goal: Maintains optimal cardiac output and hemodynamic stability  Outcome: Progressing     Problem: Cardiovascular - Adult  Goal: Absence of cardiac dysrhythmias or at baseline  Outcome: Progressing     Problem: Skin/Tissue Integrity - Adult  Goal: Skin integrity remains intact  Outcome: Progressing     Problem: Skin/Tissue Integrity - Adult  Goal: Oral mucous membranes remain intact  Outcome: Progressing     Problem: Musculoskeletal - Adult  Goal:

## 2023-07-10 NOTE — DISCHARGE SUMMARY
Refills: 0    Comments: FILL ON OR AFTER 8/12/22  Associated Diagnoses: Hospital discharge follow-up; Chronic obstructive pulmonary disease, unspecified COPD type ([address]); Tobacco dependency      atorvastatin (LIPITOR) 40 MG tablet Take 1 tablet by mouth nightly  Qty: 30 tablet, Refills: 11    Associated Diagnoses: Hospital discharge follow-up; CAD in native artery; Pure hypercholesterolemia      mometasone-formoterol (DULERA) 200-5 MCG/ACT inhaler Inhale 2 puffs into the lungs every 12 hours  Qty: 1 each, Refills: 0           STOP taking these medications       sertraline (ZOLOFT) 50 MG tablet Comments:   Reason for Stopping:         SYMBICORT 80-4.5 MCG/ACT AERO Comments:   Reason for Stopping:                 Note  that  31  minutes were spent in preparing discharge papers, discussing discharge with patient, medication review, etc.    NOTE: This report was transcribed using voice recognition software. Every effort was made to ensure accuracy; however, inadvertent computerized transcription errors may be present.      Signed:  Electronically signed by Jessie Coughlin MD on 7/10/2023 at 6:43 PM

## 2023-07-10 NOTE — PROGRESS NOTES
OCCUPATIONAL THERAPY BEDSIDE TREATMENT NOTE  [facility]  [address] [place]    Date:7/10/2023  Patient Name: Maxime Ambrosio  MRN: [id number]  : 1960  Room: [address] [place] [address]       Evaluating OT: Audra Krishnamurthy OTR/L; #[id number]      Referring Provider and Specific Provider Orders/Date:      23   OT eval and treat  Start:  23,   End:  23,   ONE TIME,   Standing Count:  1 Occurrences,   R         Zaynab Dire, DO       Placement Recommendation: Subacute Rehab        Diagnosis:   1. Atrial fibrillation with RVR ([address])    2. Alcohol withdrawal syndrome without complication ([address])    3. Exertional dyspnea         Surgery: None       Pertinent Medical History:       Past Medical History        Past Medical History:   Diagnosis Date    Acute on chronic heart failure with preserved ejection fraction ([address]) 7/3/2023    CAD in native artery 2022    Chronic obstructive pulmonary disease ([address])      Depression with anxiety 2022    Diabetes mellitus ([address])      H/O cardiovascular stress test 2022     Lexiscan    Hyperlipidemia      Hypertension              Past Surgical History         Past Surgical History:   Procedure Laterality Date    APPENDECTOMY        CARDIOVASCULAR STRESS TEST N/A 2021     Lexiscan stress test    COLONOSCOPY        LUNG SURGERY         from scar tissue    NECK SURGERY N/A 2020     EXCISION POSTERIOR SOFT TISSUE NEOPLASM NECK (CPT 90539) performed by Chen Kay MD at [facility] OR            Precautions:  Up with assistance, falls, alarm, and CIWA , seizure precautions, weakness, SOB with activities      Assessment of current deficits:     [x] Functional mobility            [x]ADLs           [x] Strength                   []Cognition    [x] Functional transfers          [x] IADLs          [] Safety Awareness   [x]Endurance    [] Fine Coordination              [x] Balance      []

## 2023-07-10 NOTE — CARE COORDINATION
7-10-Cm note: pt accepted to [facility], precert started, HENS completed, NIKKY completed, PRECERT PENDING. Cm following , pt is agreeable to above dc plan.  Electronically signed by Kitty Nuñez RN on 7/10/2023 at 10:46 AM

## 2023-07-10 NOTE — PLAN OF CARE
Problem: Discharge Planning  Goal: Discharge to home or other facility with appropriate resources  7/10/2023 1143 by Olivia Samuel RN  Outcome: Progressing     Problem: Skin/Tissue Integrity  Goal: Absence of new skin breakdown  Description: 1. Monitor for areas of redness and/or skin breakdown  2. Assess vascular access sites hourly  3. Every 4-6 hours minimum:  Change oxygen saturation probe site  4. Every 4-6 hours:  If on nasal continuous positive airway pressure, respiratory therapy assess nares and determine need for appliance change or resting period.   7/10/2023 1143 by Olivia Samuel RN  Outcome: Progressing     Problem: Safety - Adult  Goal: Free from fall injury  7/10/2023 1143 by Olivia Samuel RN  Outcome: Progressing     Problem: Pain  Goal: Verbalizes/displays adequate comfort level or baseline comfort level  7/10/2023 1143 by Olivia Samuel RN  Outcome: Progressing     Problem: ABCDS Injury Assessment  Goal: Absence of physical injury  7/10/2023 1143 by Olivia Samuel RN  Outcome: Progressing     Problem: Neurosensory - Adult  Goal: Achieves stable or improved neurological status  7/10/2023 1143 by Olivia Samuel RN  Outcome: Progressing  Flowsheets (Taken 7/10/2023 0804)  Achieves stable or improved neurological status: Assess for and report changes in neurological status     Problem: Neurosensory - Adult  Goal: Absence of seizures  7/10/2023 1143 by Olivia Samuel RN  Outcome: Progressing     Problem: Neurosensory - Adult  Goal: Remains free of injury related to seizures activity  7/10/2023 1143 by Olivia Samuel RN  Outcome: Progressing     Problem: Neurosensory - Adult  Goal: Achieves maximal functionality and self care  7/10/2023 1143 by Olivia Samuel RN  Outcome: Progressing     Problem: Respiratory - Adult  Goal: Achieves optimal ventilation and oxygenation  7/10/2023 1143 by Olivia Samuel RN  Outcome: Progressing

## 2023-07-10 NOTE — PROGRESS NOTES
[facility]   Progress Note    Admitting Date and Time: 7/2/2023  7:55 AM  Admit Dx: Atrial fibrillation with rapid ventricular response ([address]) [I48.91]    Subjective/interval history:    7/4: Pt is awake and alert. Alcohol withdrawal syndrome symptoms well controlled. He notes that nicotine patch has not been sufficient to stave off nicotine withdrawal symptoms and cravings. Heart rate has been intermittently elevated as high as 140s, but spontaneously dropped back down to 90s. Remains in atrial fibrillation. Had a long discussion with patient today regarding risks and benefits of anticoagulation. He has thrombocytopenia which is most likely due to alcoholic liver disease, the platelet count has been stable in the 50s the last couple of days. Discussed that he would be at increased risk of bleeding on apixaban, however this can help prevent stroke and he is at high risk of stroke given his atrial fibrillation and other comorbidities. He is agreeable to starting apixaban for stroke prevention. Discussed with cardiology who recommended reduced dose of 2.5 mg p.o. twice daily in the setting of thrombocytopenia. 7/5: Patient awake and alert, feels about the same as yesterday. Tremor resolved and last dose of phenobarbital taper due this evening. 7/6: Patient complaining of headache and difficulty sleeping. Notes melatonin does not help. Yesterday evening heart rate increased to 130s and he was given one-time dose of digoxin by cardiology. Heart rate improved so far today. Alcohol withdrawal syndrome symptoms have resolved. Also to note, patient is complaining of bilateral ear fullness. 7/7: Patient is awake and alert, denies any new complaints. He is scheduled for cardioversion today. I discussed that he is medically close to discharge, and [facility] may be his best option as they can provide both PT/OT and substance abuse treatment.   He states he will

## 2023-07-10 NOTE — CARE COORDINATION
7-10-Cm note: received call from [facility], they are not able to accept pt under his insurance (doesn't pay them) pt aware, [facility] pt, he is agreeable to go there at [place], he is a \"Click six\" and can go today with no precert. Pending updated OT notes , [facility] is scheduled to transport pt via wheelchair at 7 PM tonight. Updated HENS completed .  Electronically signed by Marifer Boudreaux RN on 7/10/2023 at 1:05 PM

## 2023-07-10 NOTE — PROGRESS NOTES
Comprehensive Nutrition Assessment    Type and Reason for Visit:  Reassess    Nutrition Recommendations/Plan:   Current intakes good at %; continue current diet. Malnutrition Assessment:  Malnutrition Status: At risk for malnutrition (Comment) (07/03/23 1131)    Context:  Acute Illness     Findings of the 6 clinical characteristics of malnutrition:  Energy Intake:  50% or less of estimated energy requirements for 5 or more days (Pt w/ little PO x several days PTA.)  Weight Loss:  Unable to assess (Lack of wt hx to trends. Pt suspects wt loss d/t minimal oral intake PTA>)     Body Fat Loss:  No significant body fat loss     Muscle Mass Loss:  No significant muscle mass loss    Fluid Accumulation:  No significant fluid accumulation     Strength:  Not Performed    Nutrition Assessment:    Pt with good oral intakes, consuming % of meals, good appetite. Pt admitted with atrial fibrillation with RVR; Alcohol withdrawal syndrome without complication. PMHx CAD, COPD, depression, HLD, HTN, DM, alcoholism, and tobacco abuse . Pt noted several days with little to no PO PTA. ONS initiated on admission, pt dislikes. Will continue to monitor & provide nutrition intervention as necessary. Nutrition Related Findings:    A/O x 4; -3.76 L I/Os; abd WDL, abd active BS, round, trace edema. Wound Type: None       Current Nutrition Intake & Therapies:    Average Meal Intake: %  Average Supplements Intake: None Ordered  ADULT DIET; Regular; 5 carb choices (75 gm/meal)    Anthropometric Measures:  Height: 5' 11\" (180.3 cm)  Ideal Body Weight (IBW): 172 lbs (78 kg)    Admission Body Weight: 233 lb 0.4 oz (105.7 kg) (7/4/23, 1st measured)  Current Body Weight: 240 lb 1.3 oz (108.9 kg) (7/8), 145.3 % IBW.  Weight Source: Bed Scale  Current BMI (kg/m2): 33.5  Usual Body Weight:  (DIVINA: lack of wt hx to trend)     Weight Adjustment For: No Adjustment                 BMI Categories: Obese Class 1 (BMI

## 2023-07-11 ENCOUNTER — OUTSIDE SERVICES (OUTPATIENT)
Dept: PRIMARY CARE CLINIC | Age: 63
End: 2023-07-11

## 2023-07-11 DIAGNOSIS — I48.0 PAROXYSMAL ATRIAL FIBRILLATION (HCC): Primary | ICD-10-CM

## 2023-07-11 DIAGNOSIS — I25.10 CORONARY ARTERY DISEASE INVOLVING NATIVE CORONARY ARTERY OF NATIVE HEART WITHOUT ANGINA PECTORIS: ICD-10-CM

## 2023-07-11 DIAGNOSIS — G47.33 OSA (OBSTRUCTIVE SLEEP APNEA): ICD-10-CM

## 2023-07-11 DIAGNOSIS — F10.930 ALCOHOL WITHDRAWAL SYNDROME WITHOUT COMPLICATION (HCC): ICD-10-CM

## 2023-07-11 DIAGNOSIS — J44.9 CHRONIC OBSTRUCTIVE PULMONARY DISEASE, UNSPECIFIED COPD TYPE (HCC): ICD-10-CM

## 2023-07-11 DIAGNOSIS — K70.9 ALCOHOLIC LIVER DISEASE (HCC): ICD-10-CM

## 2023-07-11 DIAGNOSIS — I10 ESSENTIAL HYPERTENSION: ICD-10-CM

## 2023-07-11 DIAGNOSIS — E78.2 MIXED HYPERLIPIDEMIA: ICD-10-CM

## 2023-07-11 DIAGNOSIS — I50.33 ACUTE ON CHRONIC DIASTOLIC HEART FAILURE (HCC): ICD-10-CM

## 2023-07-11 DIAGNOSIS — E11.65 TYPE 2 DIABETES MELLITUS WITH HYPERGLYCEMIA, WITHOUT LONG-TERM CURRENT USE OF INSULIN (HCC): ICD-10-CM

## 2023-07-12 ASSESSMENT — ENCOUNTER SYMPTOMS
SORE THROAT: 0
SHORTNESS OF BREATH: 0
GASTROINTESTINAL NEGATIVE: 1
WHEEZING: 0
SINUS PAIN: 0
RHINORRHEA: 0
SINUS PRESSURE: 0
EYE DISCHARGE: 0
VOICE CHANGE: 0
TROUBLE SWALLOWING: 0
COUGH: 0
EYE ITCHING: 0
EYE REDNESS: 0

## 2023-07-12 ASSESSMENT — COPD QUESTIONNAIRES: COPD: 1

## 2023-07-12 ASSESSMENT — VISUAL ACUITY: OU: 1

## 2023-07-17 ENCOUNTER — OFFICE VISIT (OUTPATIENT)
Dept: FAMILY MEDICINE CLINIC | Age: 63
End: 2023-07-17
Payer: COMMERCIAL

## 2023-07-17 VITALS
HEART RATE: 98 BPM | TEMPERATURE: 98.1 F | SYSTOLIC BLOOD PRESSURE: 102 MMHG | HEIGHT: 71 IN | DIASTOLIC BLOOD PRESSURE: 60 MMHG | OXYGEN SATURATION: 96 % | RESPIRATION RATE: 18 BRPM | BODY MASS INDEX: 32.48 KG/M2 | WEIGHT: 232 LBS

## 2023-07-17 DIAGNOSIS — I50.33 ACUTE ON CHRONIC HEART FAILURE WITH PRESERVED EJECTION FRACTION (HCC): ICD-10-CM

## 2023-07-17 DIAGNOSIS — I25.10 CORONARY ARTERY DISEASE INVOLVING NATIVE CORONARY ARTERY OF NATIVE HEART WITHOUT ANGINA PECTORIS: ICD-10-CM

## 2023-07-17 DIAGNOSIS — F41.8 DEPRESSION WITH ANXIETY: ICD-10-CM

## 2023-07-17 DIAGNOSIS — I25.10 CAD IN NATIVE ARTERY: ICD-10-CM

## 2023-07-17 DIAGNOSIS — J44.9 CHRONIC OBSTRUCTIVE PULMONARY DISEASE, UNSPECIFIED COPD TYPE (HCC): ICD-10-CM

## 2023-07-17 DIAGNOSIS — F17.200 TOBACCO DEPENDENCY: ICD-10-CM

## 2023-07-17 DIAGNOSIS — Z09 HOSPITAL DISCHARGE FOLLOW-UP: Primary | ICD-10-CM

## 2023-07-17 DIAGNOSIS — R51.9 INTRACTABLE HEADACHE, UNSPECIFIED CHRONICITY PATTERN, UNSPECIFIED HEADACHE TYPE: ICD-10-CM

## 2023-07-17 DIAGNOSIS — F10.930 ALCOHOL WITHDRAWAL SYNDROME WITHOUT COMPLICATION (HCC): ICD-10-CM

## 2023-07-17 DIAGNOSIS — Z76.0 ENCOUNTER FOR MEDICATION REFILL: ICD-10-CM

## 2023-07-17 DIAGNOSIS — I48.91 ATRIAL FIBRILLATION WITH RVR (HCC): ICD-10-CM

## 2023-07-17 DIAGNOSIS — F51.04 PSYCHOPHYSIOLOGICAL INSOMNIA: ICD-10-CM

## 2023-07-17 DIAGNOSIS — R53.83 OTHER FATIGUE: ICD-10-CM

## 2023-07-17 DIAGNOSIS — M62.81 GENERALIZED MUSCLE WEAKNESS: ICD-10-CM

## 2023-07-17 DIAGNOSIS — J32.9 CHRONIC SINUSITIS, UNSPECIFIED LOCATION: ICD-10-CM

## 2023-07-17 DIAGNOSIS — E78.00 PURE HYPERCHOLESTEROLEMIA: ICD-10-CM

## 2023-07-17 PROCEDURE — G8417 CALC BMI ABV UP PARAM F/U: HCPCS | Performed by: FAMILY MEDICINE

## 2023-07-17 PROCEDURE — 4004F PT TOBACCO SCREEN RCVD TLK: CPT | Performed by: FAMILY MEDICINE

## 2023-07-17 PROCEDURE — 99215 OFFICE O/P EST HI 40 MIN: CPT | Performed by: FAMILY MEDICINE

## 2023-07-17 PROCEDURE — G8427 DOCREV CUR MEDS BY ELIG CLIN: HCPCS | Performed by: FAMILY MEDICINE

## 2023-07-17 PROCEDURE — 3023F SPIROM DOC REV: CPT | Performed by: FAMILY MEDICINE

## 2023-07-17 PROCEDURE — 1111F DSCHRG MED/CURRENT MED MERGE: CPT | Performed by: FAMILY MEDICINE

## 2023-07-17 PROCEDURE — 3078F DIAST BP <80 MM HG: CPT | Performed by: FAMILY MEDICINE

## 2023-07-17 PROCEDURE — 3017F COLORECTAL CA SCREEN DOC REV: CPT | Performed by: FAMILY MEDICINE

## 2023-07-17 PROCEDURE — 3074F SYST BP LT 130 MM HG: CPT | Performed by: FAMILY MEDICINE

## 2023-07-17 RX ORDER — HYDROXYZINE PAMOATE 50 MG/1
50 CAPSULE ORAL NIGHTLY PRN
Qty: 30 CAPSULE | Refills: 11 | Status: SHIPPED | OUTPATIENT
Start: 2023-07-17 | End: 2024-07-17

## 2023-07-17 RX ORDER — METOPROLOL SUCCINATE 25 MG/1
25 TABLET, EXTENDED RELEASE ORAL 2 TIMES DAILY
Qty: 30 TABLET | Refills: 11 | Status: SHIPPED | OUTPATIENT
Start: 2023-07-17 | End: 2024-07-17

## 2023-07-17 RX ORDER — NICOTINE 21 MG/24HR
1 PATCH, TRANSDERMAL 24 HOURS TRANSDERMAL EVERY 24 HOURS
Qty: 14 PATCH | Refills: 0 | Status: SHIPPED | OUTPATIENT
Start: 2023-07-17 | End: 2024-07-17

## 2023-07-17 RX ORDER — OMEPRAZOLE 40 MG/1
40 CAPSULE, DELAYED RELEASE ORAL DAILY PRN
Qty: 30 CAPSULE | Refills: 11 | Status: SHIPPED | OUTPATIENT
Start: 2023-07-17 | End: 2024-07-17

## 2023-07-17 RX ORDER — THIAMINE MONONITRATE (VIT B1) 100 MG
100 TABLET ORAL DAILY
Qty: 30 TABLET | Refills: 11 | Status: SHIPPED | OUTPATIENT
Start: 2023-07-17 | End: 2024-07-17

## 2023-07-17 RX ORDER — ATORVASTATIN CALCIUM 40 MG/1
40 TABLET, FILM COATED ORAL NIGHTLY
Qty: 30 TABLET | Refills: 11 | Status: SHIPPED | OUTPATIENT
Start: 2023-07-17 | End: 2024-07-17

## 2023-07-17 RX ORDER — QUETIAPINE FUMARATE 25 MG/1
50 TABLET, FILM COATED ORAL NIGHTLY
Qty: 60 TABLET | Refills: 11 | Status: SHIPPED | OUTPATIENT
Start: 2023-07-17 | End: 2024-07-17

## 2023-07-17 RX ORDER — ACETAMINOPHEN 500 MG
1000 TABLET ORAL 3 TIMES DAILY
Qty: 180 TABLET | Refills: 3 | Status: SHIPPED | OUTPATIENT
Start: 2023-07-17 | End: 2024-07-17

## 2023-07-17 RX ORDER — TRAZODONE HYDROCHLORIDE 100 MG/1
100 TABLET ORAL NIGHTLY PRN
Qty: 30 TABLET | Refills: 11 | Status: SHIPPED | OUTPATIENT
Start: 2023-07-17 | End: 2024-07-17

## 2023-07-17 RX ORDER — ALBUTEROL SULFATE 90 UG/1
2 AEROSOL, METERED RESPIRATORY (INHALATION) EVERY 6 HOURS PRN
Qty: 1 EACH | Refills: 11
Start: 2023-07-17 | End: 2024-07-17

## 2023-07-17 RX ORDER — FLUTICASONE PROPIONATE 50 MCG
2 SPRAY, SUSPENSION (ML) NASAL DAILY
Qty: 1 EACH | Refills: 11 | Status: SHIPPED | OUTPATIENT
Start: 2023-07-17 | End: 2024-07-17

## 2023-07-17 SDOH — ECONOMIC STABILITY: INCOME INSECURITY: HOW HARD IS IT FOR YOU TO PAY FOR THE VERY BASICS LIKE FOOD, HOUSING, MEDICAL CARE, AND HEATING?: VERY HARD

## 2023-07-17 SDOH — ECONOMIC STABILITY: HOUSING INSECURITY
IN THE LAST 12 MONTHS, WAS THERE A TIME WHEN YOU DID NOT HAVE A STEADY PLACE TO SLEEP OR SLEPT IN A SHELTER (INCLUDING NOW)?: NO

## 2023-07-17 SDOH — ECONOMIC STABILITY: FOOD INSECURITY: WITHIN THE PAST 12 MONTHS, YOU WORRIED THAT YOUR FOOD WOULD RUN OUT BEFORE YOU GOT MONEY TO BUY MORE.: SOMETIMES TRUE

## 2023-07-17 SDOH — ECONOMIC STABILITY: FOOD INSECURITY: WITHIN THE PAST 12 MONTHS, THE FOOD YOU BOUGHT JUST DIDN'T LAST AND YOU DIDN'T HAVE MONEY TO GET MORE.: SOMETIMES TRUE

## 2023-07-17 ASSESSMENT — ENCOUNTER SYMPTOMS
NAUSEA: 0
ABDOMINAL PAIN: 0
DIARRHEA: 0
BLOOD IN STOOL: 0
SORE THROAT: 0
SHORTNESS OF BREATH: 0
WHEEZING: 0
COUGH: 0
CONSTIPATION: 0
BACK PAIN: 0
VOMITING: 0

## 2023-07-17 ASSESSMENT — PATIENT HEALTH QUESTIONNAIRE - PHQ9: DEPRESSION UNABLE TO ASSESS: FUNCTIONAL CAPACITY MOTIVATION LIMITS ACCURACY

## 2023-07-17 NOTE — PROGRESS NOTES
Post-Discharge Transitional Care Follow Up      Titi Ramirez   YOB: 1960    Date of Office Visit:  7/17/2023  Date of Hospital Admission: 7/2/23  Date of Hospital Discharge: 7/10/23  Readmission Risk Score (high >=14%. Medium >=10%):Readmission Risk Score: 12.5      Care management risk score Rising risk (score 2-5) and Complex Care (Scores >=6): No Risk Score On File     Non face to face  following discharge, date last encounter closed (first attempt may have been earlier): *No documented post hospital discharge outreach found in the last 14 days     Call initiated 2 business days of discharge: *No response recorded in the last 14 days     Hospital discharge follow-up  -     apixaban (ELIQUIS) 5 MG TABS tablet; Take 1 tablet by mouth 2 times daily, Disp-60 tablet, R-117/17/23: DISREGARD PREVIOUS PRESCRIPTIONS AND REFILLS; HONOR THIS PRESCRIPTION AND REFILLSNormal  -     hydrOXYzine pamoate (VISTARIL) 50 MG capsule; Take 1 capsule by mouth nightly as needed (sleep), Disp-30 capsule, R-117/17/23: DISREGARD PREVIOUS PRESCRIPTIONS AND REFILLS; HONOR THIS PRESCRIPTION AND REFILLSNormal  -     traZODone (DESYREL) 100 MG tablet; Take 1 tablet by mouth nightly as needed for Sleep, Disp-30 tablet, R-117/17/23: DISREGARD PREVIOUS PRESCRIPTIONS AND REFILLS; HONOR THIS PRESCRIPTION AND REFILLSNormal  -     QUEtiapine (SEROQUEL) 25 MG tablet; Take 2 tablets by mouth nightly, Disp-60 tablet, R-117/17/23: DISREGARD PREVIOUS PRESCRIPTIONS AND REFILLS; HONOR THIS PRESCRIPTION AND REFILLSNormal  -     albuterol sulfate HFA (PROAIR HFA) 108 (90 Base) MCG/ACT inhaler; Inhale 2 puffs into the lungs every 6 hours as needed for Wheezing, Disp-1 each, R-117/17/23: DISREGARD PREVIOUS PRESCRIPTIONS AND REFILLS; HONOR THIS PRESCRIPTION AND REFILLS. Patient reports suboptimal control from Jose Palencia, [facility].   Furthermore, none of the aforementioned inhalers are covered by insuranceNO PRINT  -     omeprazole

## 2023-07-24 ENCOUNTER — APPOINTMENT (OUTPATIENT)
Dept: GENERAL RADIOLOGY | Age: 63
End: 2023-07-24
Payer: COMMERCIAL

## 2023-07-24 ENCOUNTER — HOSPITAL ENCOUNTER (INPATIENT)
Age: 63
LOS: 10 days | Discharge: NURSING, REHAB OR LONG-TERM CARE | End: 2023-08-03
Attending: EMERGENCY MEDICINE | Admitting: FAMILY MEDICINE
Payer: COMMERCIAL

## 2023-07-24 ENCOUNTER — TELEPHONE (OUTPATIENT)
Dept: OTHER | Facility: CLINIC | Age: 63
End: 2023-07-24

## 2023-07-24 ENCOUNTER — TELEPHONE (OUTPATIENT)
Dept: FAMILY MEDICINE CLINIC | Age: 63
End: 2023-07-24

## 2023-07-24 DIAGNOSIS — I50.33 ACUTE ON CHRONIC HEART FAILURE WITH PRESERVED EJECTION FRACTION (HCC): ICD-10-CM

## 2023-07-24 DIAGNOSIS — F51.04 PSYCHOPHYSIOLOGICAL INSOMNIA: ICD-10-CM

## 2023-07-24 DIAGNOSIS — I48.91 ATRIAL FIBRILLATION WITH RVR (HCC): ICD-10-CM

## 2023-07-24 DIAGNOSIS — E87.20 LACTIC ACIDOSIS: ICD-10-CM

## 2023-07-24 DIAGNOSIS — Z09 HOSPITAL DISCHARGE FOLLOW-UP: ICD-10-CM

## 2023-07-24 DIAGNOSIS — I25.10 CAD IN NATIVE ARTERY: ICD-10-CM

## 2023-07-24 DIAGNOSIS — F10.930 ALCOHOL WITHDRAWAL SYNDROME WITHOUT COMPLICATION (HCC): ICD-10-CM

## 2023-07-24 DIAGNOSIS — Z76.0 ENCOUNTER FOR MEDICATION REFILL: ICD-10-CM

## 2023-07-24 DIAGNOSIS — I25.10 CORONARY ARTERY DISEASE INVOLVING NATIVE CORONARY ARTERY OF NATIVE HEART WITHOUT ANGINA PECTORIS: ICD-10-CM

## 2023-07-24 DIAGNOSIS — J32.9 CHRONIC SINUSITIS, UNSPECIFIED LOCATION: ICD-10-CM

## 2023-07-24 DIAGNOSIS — F17.200 TOBACCO DEPENDENCY: ICD-10-CM

## 2023-07-24 DIAGNOSIS — F10.939 ALCOHOL WITHDRAWAL SYNDROME WITH COMPLICATION (HCC): Primary | ICD-10-CM

## 2023-07-24 DIAGNOSIS — F41.8 DEPRESSION WITH ANXIETY: ICD-10-CM

## 2023-07-24 DIAGNOSIS — E78.00 PURE HYPERCHOLESTEROLEMIA: ICD-10-CM

## 2023-07-24 DIAGNOSIS — J44.9 CHRONIC OBSTRUCTIVE PULMONARY DISEASE, UNSPECIFIED COPD TYPE (HCC): ICD-10-CM

## 2023-07-24 PROBLEM — I50.9 ACUTE DECOMPENSATED HEART FAILURE (HCC): Status: RESOLVED | Noted: 2023-07-03 | Resolved: 2023-07-24

## 2023-07-24 PROBLEM — E66.811 OBESITY (BMI 30.0-34.9): Status: ACTIVE | Noted: 2023-07-03

## 2023-07-24 LAB
ALBUMIN SERPL-MCNC: 3.8 G/DL (ref 3.5–5.2)
ALP SERPL-CCNC: 168 U/L (ref 40–129)
ALT SERPL-CCNC: 15 U/L (ref 0–40)
AMMONIA PLAS-SCNC: 19 UMOL/L (ref 16–60)
AMPHET UR QL SCN: NEGATIVE
ANION GAP SERPL CALCULATED.3IONS-SCNC: 13 MMOL/L (ref 7–16)
APAP SERPL-MCNC: <5 UG/ML (ref 10–30)
AST SERPL-CCNC: 20 U/L (ref 0–39)
BARBITURATES UR QL SCN: POSITIVE
BASOPHILS # BLD: 0.02 K/UL (ref 0–0.2)
BASOPHILS NFR BLD: 0 % (ref 0–2)
BENZODIAZ UR QL: POSITIVE
BILIRUB DIRECT SERPL-MCNC: <0.2 MG/DL (ref 0–0.3)
BILIRUB INDIRECT SERPL-MCNC: ABNORMAL MG/DL (ref 0–1)
BILIRUB SERPL-MCNC: 0.5 MG/DL (ref 0–1.2)
BUN SERPL-MCNC: 11 MG/DL (ref 6–23)
BUPRENORPHINE UR QL: NEGATIVE
CALCIUM SERPL-MCNC: 9.1 MG/DL (ref 8.6–10.2)
CANNABINOIDS UR QL SCN: NEGATIVE
CHLORIDE SERPL-SCNC: 100 MMOL/L (ref 98–107)
CK SERPL-CCNC: 50 U/L (ref 20–200)
CO2 SERPL-SCNC: 26 MMOL/L (ref 22–29)
COCAINE UR QL SCN: NEGATIVE
CREAT SERPL-MCNC: 1 MG/DL (ref 0.7–1.2)
EOSINOPHIL # BLD: 0.05 K/UL (ref 0.05–0.5)
EOSINOPHILS RELATIVE PERCENT: 1 % (ref 0–6)
ERYTHROCYTE [DISTWIDTH] IN BLOOD BY AUTOMATED COUNT: 14.4 % (ref 11.5–15)
ETHANOLAMINE SERPL-MCNC: <10 MG/DL
FENTANYL UR QL: POSITIVE
GFR SERPL CREATININE-BSD FRML MDRD: >60 ML/MIN/1.73M2
GLUCOSE SERPL-MCNC: 127 MG/DL (ref 74–107)
HCT VFR BLD AUTO: 41.6 % (ref 37–54)
HGB BLD-MCNC: 14.5 G/DL (ref 12.5–16.5)
IMM GRANULOCYTES # BLD AUTO: 0.03 K/UL (ref 0–0.58)
IMM GRANULOCYTES NFR BLD: 0 % (ref 0–5)
INR PPP: 1
LACTATE BLDV-SCNC: 2.9 MMOL/L (ref 0.5–2.2)
LIPASE SERPL-CCNC: 21 U/L (ref 13–60)
LYMPHOCYTES NFR BLD: 1.49 K/UL (ref 1.5–4)
LYMPHOCYTES RELATIVE PERCENT: 21 % (ref 20–42)
MAGNESIUM SERPL-MCNC: 1.4 MG/DL (ref 1.6–2.6)
MCH RBC QN AUTO: 35.5 PG (ref 26–35)
MCHC RBC AUTO-ENTMCNC: 34.9 G/DL (ref 32–34.5)
MCV RBC AUTO: 101.7 FL (ref 80–99.9)
METHADONE UR QL: NEGATIVE
MONOCYTES NFR BLD: 0.3 K/UL (ref 0.1–0.95)
MONOCYTES NFR BLD: 4 % (ref 2–12)
NEUTROPHILS NFR BLD: 74 % (ref 43–80)
NEUTS SEG NFR BLD: 5.35 K/UL (ref 1.8–7.3)
OPIATES UR QL SCN: NEGATIVE
OXYCODONE UR QL SCN: NEGATIVE
PCP UR QL SCN: NEGATIVE
PLATELET # BLD AUTO: 107 K/UL (ref 130–450)
PMV BLD AUTO: 11.3 FL (ref 7–12)
POTASSIUM SERPL-SCNC: 4.3 MMOL/L (ref 3.5–5)
PROT SERPL-MCNC: 7.3 G/DL (ref 6.4–8.3)
PROTHROMBIN TIME: 11.8 SEC (ref 9.3–12.4)
RBC # BLD AUTO: 4.09 M/UL (ref 3.8–5.8)
SALICYLATES SERPL-MCNC: <0.3 MG/DL (ref 0–30)
SODIUM SERPL-SCNC: 139 MMOL/L (ref 132–146)
TEST INFORMATION: ABNORMAL
TOXIC TRICYCLIC SC,BLOOD: NEGATIVE
TROPONIN I SERPL HS-MCNC: 24 NG/L (ref 0–11)
WBC OTHER # BLD: 7.2 K/UL (ref 4.5–11.5)

## 2023-07-24 PROCEDURE — 83036 HEMOGLOBIN GLYCOSYLATED A1C: CPT

## 2023-07-24 PROCEDURE — G0480 DRUG TEST DEF 1-7 CLASSES: HCPCS

## 2023-07-24 PROCEDURE — 80307 DRUG TEST PRSMV CHEM ANLYZR: CPT

## 2023-07-24 PROCEDURE — 96374 THER/PROPH/DIAG INJ IV PUSH: CPT

## 2023-07-24 PROCEDURE — 71045 X-RAY EXAM CHEST 1 VIEW: CPT

## 2023-07-24 PROCEDURE — 82140 ASSAY OF AMMONIA: CPT

## 2023-07-24 PROCEDURE — 96375 TX/PRO/DX INJ NEW DRUG ADDON: CPT

## 2023-07-24 PROCEDURE — 83605 ASSAY OF LACTIC ACID: CPT

## 2023-07-24 PROCEDURE — 99223 1ST HOSP IP/OBS HIGH 75: CPT | Performed by: FAMILY MEDICINE

## 2023-07-24 PROCEDURE — 80179 DRUG ASSAY SALICYLATE: CPT

## 2023-07-24 PROCEDURE — 6360000002 HC RX W HCPCS

## 2023-07-24 PROCEDURE — 96376 TX/PRO/DX INJ SAME DRUG ADON: CPT

## 2023-07-24 PROCEDURE — 6370000000 HC RX 637 (ALT 250 FOR IP)

## 2023-07-24 PROCEDURE — 83735 ASSAY OF MAGNESIUM: CPT

## 2023-07-24 PROCEDURE — 96361 HYDRATE IV INFUSION ADD-ON: CPT

## 2023-07-24 PROCEDURE — 83690 ASSAY OF LIPASE: CPT

## 2023-07-24 PROCEDURE — 80048 BASIC METABOLIC PNL TOTAL CA: CPT

## 2023-07-24 PROCEDURE — 85610 PROTHROMBIN TIME: CPT

## 2023-07-24 PROCEDURE — 2060000000 HC ICU INTERMEDIATE R&B

## 2023-07-24 PROCEDURE — 84484 ASSAY OF TROPONIN QUANT: CPT

## 2023-07-24 PROCEDURE — 80143 DRUG ASSAY ACETAMINOPHEN: CPT

## 2023-07-24 PROCEDURE — 2580000003 HC RX 258

## 2023-07-24 PROCEDURE — 85027 COMPLETE CBC AUTOMATED: CPT

## 2023-07-24 PROCEDURE — 36415 COLL VENOUS BLD VENIPUNCTURE: CPT

## 2023-07-24 PROCEDURE — 82550 ASSAY OF CK (CPK): CPT

## 2023-07-24 PROCEDURE — 93005 ELECTROCARDIOGRAM TRACING: CPT

## 2023-07-24 PROCEDURE — 80076 HEPATIC FUNCTION PANEL: CPT

## 2023-07-24 PROCEDURE — 99285 EMERGENCY DEPT VISIT HI MDM: CPT

## 2023-07-24 RX ORDER — HYDROXYZINE PAMOATE 25 MG/1
50 CAPSULE ORAL NIGHTLY PRN
Status: DISCONTINUED | OUTPATIENT
Start: 2023-07-24 | End: 2023-08-03 | Stop reason: HOSPADM

## 2023-07-24 RX ORDER — ATORVASTATIN CALCIUM 40 MG/1
40 TABLET, FILM COATED ORAL NIGHTLY
Status: DISCONTINUED | OUTPATIENT
Start: 2023-07-24 | End: 2023-08-03 | Stop reason: HOSPADM

## 2023-07-24 RX ORDER — LORAZEPAM 1 MG/1
2 TABLET ORAL
Status: DISCONTINUED | OUTPATIENT
Start: 2023-07-24 | End: 2023-08-01

## 2023-07-24 RX ORDER — LORAZEPAM 1 MG/1
1 TABLET ORAL
Status: DISCONTINUED | OUTPATIENT
Start: 2023-07-24 | End: 2023-08-01

## 2023-07-24 RX ORDER — M-VIT,TX,IRON,MINS/CALC/FOLIC 27MG-0.4MG
1 TABLET ORAL DAILY
Status: DISCONTINUED | OUTPATIENT
Start: 2023-07-24 | End: 2023-08-03 | Stop reason: HOSPADM

## 2023-07-24 RX ORDER — METOPROLOL SUCCINATE 25 MG/1
25 TABLET, EXTENDED RELEASE ORAL 2 TIMES DAILY
Status: DISCONTINUED | OUTPATIENT
Start: 2023-07-24 | End: 2023-08-03 | Stop reason: HOSPADM

## 2023-07-24 RX ORDER — TRAZODONE HYDROCHLORIDE 50 MG/1
100 TABLET ORAL NIGHTLY PRN
Status: DISCONTINUED | OUTPATIENT
Start: 2023-07-25 | End: 2023-08-03 | Stop reason: HOSPADM

## 2023-07-24 RX ORDER — PHENOBARBITAL SODIUM 65 MG/ML
130 INJECTION INTRAMUSCULAR ONCE
Status: COMPLETED | OUTPATIENT
Start: 2023-07-24 | End: 2023-07-24

## 2023-07-24 RX ORDER — SODIUM CHLORIDE 0.9 % (FLUSH) 0.9 %
5-40 SYRINGE (ML) INJECTION PRN
Status: DISCONTINUED | OUTPATIENT
Start: 2023-07-24 | End: 2023-08-03 | Stop reason: HOSPADM

## 2023-07-24 RX ORDER — MAGNESIUM SULFATE 1 G/100ML
1000 INJECTION INTRAVENOUS ONCE
Status: COMPLETED | OUTPATIENT
Start: 2023-07-24 | End: 2023-07-25

## 2023-07-24 RX ORDER — FOLIC ACID 1 MG/1
1 TABLET ORAL DAILY
Status: DISCONTINUED | OUTPATIENT
Start: 2023-07-24 | End: 2023-08-03 | Stop reason: HOSPADM

## 2023-07-24 RX ORDER — SODIUM CHLORIDE 0.9 % (FLUSH) 0.9 %
5-40 SYRINGE (ML) INJECTION EVERY 12 HOURS SCHEDULED
Status: DISCONTINUED | OUTPATIENT
Start: 2023-07-24 | End: 2023-08-03 | Stop reason: HOSPADM

## 2023-07-24 RX ORDER — LORAZEPAM 1 MG/1
3 TABLET ORAL
Status: DISCONTINUED | OUTPATIENT
Start: 2023-07-24 | End: 2023-08-01

## 2023-07-24 RX ORDER — PHENOBARBITAL SODIUM 65 MG/ML
65 INJECTION INTRAMUSCULAR EVERY 8 HOURS SCHEDULED
Status: COMPLETED | OUTPATIENT
Start: 2023-07-25 | End: 2023-07-25

## 2023-07-24 RX ORDER — LORAZEPAM 2 MG/ML
2 INJECTION INTRAMUSCULAR
Status: DISCONTINUED | OUTPATIENT
Start: 2023-07-24 | End: 2023-08-01

## 2023-07-24 RX ORDER — ARFORMOTEROL TARTRATE 15 UG/2ML
15 SOLUTION RESPIRATORY (INHALATION)
Status: DISCONTINUED | OUTPATIENT
Start: 2023-07-24 | End: 2023-08-03 | Stop reason: HOSPADM

## 2023-07-24 RX ORDER — ACETAMINOPHEN 325 MG/1
650 TABLET ORAL EVERY 6 HOURS PRN
Status: DISCONTINUED | OUTPATIENT
Start: 2023-07-24 | End: 2023-08-03 | Stop reason: HOSPADM

## 2023-07-24 RX ORDER — GAUZE BANDAGE 2" X 2"
100 BANDAGE TOPICAL DAILY
Status: DISCONTINUED | OUTPATIENT
Start: 2023-07-24 | End: 2023-07-24

## 2023-07-24 RX ORDER — SODIUM CHLORIDE 9 MG/ML
INJECTION, SOLUTION INTRAVENOUS PRN
Status: DISCONTINUED | OUTPATIENT
Start: 2023-07-24 | End: 2023-07-24 | Stop reason: SDUPTHER

## 2023-07-24 RX ORDER — ENOXAPARIN SODIUM 100 MG/ML
30 INJECTION SUBCUTANEOUS 2 TIMES DAILY
Status: DISCONTINUED | OUTPATIENT
Start: 2023-07-24 | End: 2023-07-24

## 2023-07-24 RX ORDER — 0.9 % SODIUM CHLORIDE 0.9 %
1000 INTRAVENOUS SOLUTION INTRAVENOUS ONCE
Status: COMPLETED | OUTPATIENT
Start: 2023-07-24 | End: 2023-07-24

## 2023-07-24 RX ORDER — SODIUM CHLORIDE 9 MG/ML
INJECTION, SOLUTION INTRAVENOUS PRN
Status: DISCONTINUED | OUTPATIENT
Start: 2023-07-24 | End: 2023-08-03 | Stop reason: HOSPADM

## 2023-07-24 RX ORDER — QUETIAPINE FUMARATE 25 MG/1
50 TABLET, FILM COATED ORAL NIGHTLY
Status: DISCONTINUED | OUTPATIENT
Start: 2023-07-24 | End: 2023-08-03 | Stop reason: HOSPADM

## 2023-07-24 RX ORDER — 0.9 % SODIUM CHLORIDE 0.9 %
1000 INTRAVENOUS SOLUTION INTRAVENOUS ONCE
Status: COMPLETED | OUTPATIENT
Start: 2023-07-24 | End: 2023-07-25

## 2023-07-24 RX ORDER — GAUZE BANDAGE 2" X 2"
100 BANDAGE TOPICAL DAILY
Status: DISCONTINUED | OUTPATIENT
Start: 2023-07-24 | End: 2023-08-03 | Stop reason: HOSPADM

## 2023-07-24 RX ORDER — LORAZEPAM 2 MG/ML
3 INJECTION INTRAMUSCULAR
Status: DISCONTINUED | OUTPATIENT
Start: 2023-07-24 | End: 2023-08-01

## 2023-07-24 RX ORDER — IPRATROPIUM BROMIDE AND ALBUTEROL SULFATE 2.5; .5 MG/3ML; MG/3ML
1 SOLUTION RESPIRATORY (INHALATION)
Status: DISCONTINUED | OUTPATIENT
Start: 2023-07-25 | End: 2023-08-03 | Stop reason: HOSPADM

## 2023-07-24 RX ORDER — NICOTINE 21 MG/24HR
1 PATCH, TRANSDERMAL 24 HOURS TRANSDERMAL EVERY 24 HOURS
Status: DISCONTINUED | OUTPATIENT
Start: 2023-07-24 | End: 2023-08-03 | Stop reason: HOSPADM

## 2023-07-24 RX ORDER — LORAZEPAM 1 MG/1
4 TABLET ORAL
Status: DISCONTINUED | OUTPATIENT
Start: 2023-07-24 | End: 2023-08-01

## 2023-07-24 RX ORDER — ACETAMINOPHEN 650 MG/1
650 SUPPOSITORY RECTAL EVERY 6 HOURS PRN
Status: DISCONTINUED | OUTPATIENT
Start: 2023-07-24 | End: 2023-08-03 | Stop reason: HOSPADM

## 2023-07-24 RX ORDER — SODIUM CHLORIDE, SODIUM LACTATE, POTASSIUM CHLORIDE, CALCIUM CHLORIDE 600; 310; 30; 20 MG/100ML; MG/100ML; MG/100ML; MG/100ML
INJECTION, SOLUTION INTRAVENOUS CONTINUOUS
Status: DISCONTINUED | OUTPATIENT
Start: 2023-07-24 | End: 2023-07-28

## 2023-07-24 RX ORDER — LORAZEPAM 2 MG/ML
4 INJECTION INTRAMUSCULAR
Status: DISCONTINUED | OUTPATIENT
Start: 2023-07-24 | End: 2023-08-01

## 2023-07-24 RX ORDER — LORAZEPAM 2 MG/ML
1 INJECTION INTRAMUSCULAR
Status: DISCONTINUED | OUTPATIENT
Start: 2023-07-24 | End: 2023-08-01

## 2023-07-24 RX ORDER — BUDESONIDE 0.5 MG/2ML
0.5 INHALANT ORAL
Status: DISCONTINUED | OUTPATIENT
Start: 2023-07-24 | End: 2023-08-03 | Stop reason: HOSPADM

## 2023-07-24 RX ADMIN — LORAZEPAM 3 MG: 2 INJECTION INTRAMUSCULAR; INTRAVENOUS at 21:31

## 2023-07-24 RX ADMIN — FOLIC ACID 1 MG: 1 TABLET ORAL at 16:16

## 2023-07-24 RX ADMIN — LORAZEPAM 3 MG: 2 INJECTION INTRAMUSCULAR; INTRAVENOUS at 19:27

## 2023-07-24 RX ADMIN — SODIUM CHLORIDE 1000 ML: 9 INJECTION, SOLUTION INTRAVENOUS at 16:12

## 2023-07-24 RX ADMIN — SODIUM CHLORIDE 1000 ML: 9 INJECTION, SOLUTION INTRAVENOUS at 20:37

## 2023-07-24 RX ADMIN — THIAMINE HCL TAB 100 MG 100 MG: 100 TAB at 16:16

## 2023-07-24 RX ADMIN — LORAZEPAM 3 MG: 2 INJECTION INTRAMUSCULAR; INTRAVENOUS at 16:25

## 2023-07-24 RX ADMIN — PHENOBARBITAL SODIUM 130 MG: 65 INJECTION INTRAMUSCULAR at 17:01

## 2023-07-24 ASSESSMENT — PAIN DESCRIPTION - PAIN TYPE: TYPE: CHRONIC PAIN

## 2023-07-24 ASSESSMENT — PAIN SCALES - GENERAL: PAINLEVEL_OUTOF10: 5

## 2023-07-24 ASSESSMENT — PAIN - FUNCTIONAL ASSESSMENT: PAIN_FUNCTIONAL_ASSESSMENT: 0-10

## 2023-07-24 NOTE — TELEPHONE ENCOUNTER
Writer contacted Dr. Mikaela Diez to inform of 30 day readmission risk. Arvinr's attempt to contact Dr. Mikaela Diez was unsuccessful.     Call Back: If you need to call back to inform of disposition you can contact me at [phone]

## 2023-07-24 NOTE — TELEPHONE ENCOUNTER
Andree Parker from [facility] home care left message that sent patient to hospital that is unable to get off couch, alcohol abuse/withdrawal, failure to thrive. .    Needs to have long term rehabilitation and would like to personally speak with you regarding patient. States that patient cannot go back to that house that is not fit to be in. Patient should of never of gotten out of rehab to begin with as patient is going backwards. Would like to personally speak with you about patient. His number is [phone], Andree Parker, RN.

## 2023-07-24 NOTE — CARE COORDINATION
SS Note: SW received call from [facility] [phone]. She noted she is assigned to this case but has not been able to meet/speak to pt yet. [facility] & his niece was trying to help him clean it out today. There are dead animals and feces as well as flies and maggots all in the house. There is broken window and the cat brings in dead animals. Pt has not showered for 2 weeks and is an alcoholic- drinking litre vodka a day per niece. Pt is on [facility] for [facility] & wants help for ETOH abuse. She would like to be called w/disposition. SW notes pt not seen by physician yet and if he is interested, can have PRS speak to pt. Of note: Pt was recent admission here for A-fib w/RVR & d/c'd 7/10 and [facility] Attempted to get pt in   [facility] but insurance would not approve. MIRIAN called Nahun SINGH and she will have Benedict cancino to see pt.    Electronically signed by AVERY Bailon on 7/24/2023 at 4:31 PM

## 2023-07-25 ENCOUNTER — TELEPHONE (OUTPATIENT)
Dept: FAMILY MEDICINE CLINIC | Age: 63
End: 2023-07-25

## 2023-07-25 PROBLEM — I48.91 ATRIAL FIBRILLATION WITH RVR (HCC): Status: ACTIVE | Noted: 2023-07-25

## 2023-07-25 PROBLEM — R06.09 EXERTIONAL DYSPNEA: Status: ACTIVE | Noted: 2023-07-25

## 2023-07-25 LAB
ANION GAP SERPL CALCULATED.3IONS-SCNC: 9 MMOL/L (ref 7–16)
BASOPHILS # BLD: 0.01 K/UL (ref 0–0.2)
BASOPHILS NFR BLD: 0 % (ref 0–2)
BUN SERPL-MCNC: 11 MG/DL (ref 6–23)
CALCIUM SERPL-MCNC: 8 MG/DL (ref 8.6–10.2)
CHLORIDE SERPL-SCNC: 102 MMOL/L (ref 98–107)
CO2 SERPL-SCNC: 26 MMOL/L (ref 22–29)
CREAT SERPL-MCNC: 0.9 MG/DL (ref 0.7–1.2)
EKG ATRIAL RATE: 122 BPM
EKG P AXIS: 73 DEGREES
EKG P-R INTERVAL: 152 MS
EKG Q-T INTERVAL: 360 MS
EKG QRS DURATION: 76 MS
EKG QTC CALCULATION (BAZETT): 513 MS
EKG R AXIS: -26 DEGREES
EKG T AXIS: 109 DEGREES
EKG VENTRICULAR RATE: 122 BPM
EOSINOPHIL # BLD: 0.15 K/UL (ref 0.05–0.5)
EOSINOPHILS RELATIVE PERCENT: 2 % (ref 0–6)
ERYTHROCYTE [DISTWIDTH] IN BLOOD BY AUTOMATED COUNT: 14.2 % (ref 11.5–15)
GFR SERPL CREATININE-BSD FRML MDRD: >60 ML/MIN/1.73M2
GLUCOSE SERPL-MCNC: 132 MG/DL (ref 74–99)
HBA1C MFR BLD: 6.8 % (ref 4–5.6)
HCT VFR BLD AUTO: 38.7 % (ref 37–54)
HGB BLD-MCNC: 13 G/DL (ref 12.5–16.5)
IMM GRANULOCYTES # BLD AUTO: <0.03 K/UL (ref 0–0.58)
IMM GRANULOCYTES NFR BLD: 0 % (ref 0–5)
LYMPHOCYTES NFR BLD: 1.86 K/UL (ref 1.5–4)
LYMPHOCYTES RELATIVE PERCENT: 29 % (ref 20–42)
MAGNESIUM SERPL-MCNC: 1.6 MG/DL (ref 1.6–2.6)
MCH RBC QN AUTO: 35 PG (ref 26–35)
MCHC RBC AUTO-ENTMCNC: 33.6 G/DL (ref 32–34.5)
MCV RBC AUTO: 104.3 FL (ref 80–99.9)
METER GLUCOSE: 115 MG/DL (ref 74–99)
METER GLUCOSE: 126 MG/DL (ref 74–99)
MONOCYTES NFR BLD: 0.27 K/UL (ref 0.1–0.95)
MONOCYTES NFR BLD: 4 % (ref 2–12)
NEUTROPHILS NFR BLD: 64 % (ref 43–80)
NEUTS SEG NFR BLD: 4.17 K/UL (ref 1.8–7.3)
PLATELET # BLD AUTO: 93 K/UL (ref 130–450)
PLATELET CONFIRMATION: NORMAL
PMV BLD AUTO: 10.7 FL (ref 7–12)
POTASSIUM SERPL-SCNC: 3.2 MMOL/L (ref 3.5–5)
RBC # BLD AUTO: 3.71 M/UL (ref 3.8–5.8)
SODIUM SERPL-SCNC: 137 MMOL/L (ref 132–146)
WBC OTHER # BLD: 6.5 K/UL (ref 4.5–11.5)

## 2023-07-25 PROCEDURE — 6360000002 HC RX W HCPCS: Performed by: FAMILY MEDICINE

## 2023-07-25 PROCEDURE — 85027 COMPLETE CBC AUTOMATED: CPT

## 2023-07-25 PROCEDURE — 2060000000 HC ICU INTERMEDIATE R&B

## 2023-07-25 PROCEDURE — 82947 ASSAY GLUCOSE BLOOD QUANT: CPT

## 2023-07-25 PROCEDURE — 97161 PT EVAL LOW COMPLEX 20 MIN: CPT | Performed by: PHYSICAL THERAPIST

## 2023-07-25 PROCEDURE — 83735 ASSAY OF MAGNESIUM: CPT

## 2023-07-25 PROCEDURE — 6360000002 HC RX W HCPCS

## 2023-07-25 PROCEDURE — 99233 SBSQ HOSP IP/OBS HIGH 50: CPT | Performed by: INTERNAL MEDICINE

## 2023-07-25 PROCEDURE — 36415 COLL VENOUS BLD VENIPUNCTURE: CPT

## 2023-07-25 PROCEDURE — 97530 THERAPEUTIC ACTIVITIES: CPT | Performed by: PHYSICAL THERAPIST

## 2023-07-25 PROCEDURE — 6370000000 HC RX 637 (ALT 250 FOR IP)

## 2023-07-25 PROCEDURE — 93010 ELECTROCARDIOGRAM REPORT: CPT | Performed by: INTERNAL MEDICINE

## 2023-07-25 PROCEDURE — 6360000002 HC RX W HCPCS: Performed by: INTERNAL MEDICINE

## 2023-07-25 PROCEDURE — 94664 DEMO&/EVAL PT USE INHALER: CPT

## 2023-07-25 PROCEDURE — 2580000003 HC RX 258: Performed by: FAMILY MEDICINE

## 2023-07-25 PROCEDURE — 6370000000 HC RX 637 (ALT 250 FOR IP): Performed by: FAMILY MEDICINE

## 2023-07-25 PROCEDURE — 97110 THERAPEUTIC EXERCISES: CPT | Performed by: PHYSICAL THERAPIST

## 2023-07-25 PROCEDURE — 80048 BASIC METABOLIC PNL TOTAL CA: CPT

## 2023-07-25 PROCEDURE — 2580000003 HC RX 258

## 2023-07-25 PROCEDURE — 94640 AIRWAY INHALATION TREATMENT: CPT

## 2023-07-25 RX ORDER — PHENOBARBITAL SODIUM 65 MG/ML
16.2 INJECTION INTRAMUSCULAR 2 TIMES DAILY
Status: COMPLETED | OUTPATIENT
Start: 2023-07-28 | End: 2023-07-28

## 2023-07-25 RX ORDER — PHENOBARBITAL SODIUM 65 MG/ML
32.5 INJECTION INTRAMUSCULAR 4 TIMES DAILY
Status: COMPLETED | OUTPATIENT
Start: 2023-07-26 | End: 2023-07-26

## 2023-07-25 RX ORDER — PHENOBARBITAL SODIUM 65 MG/ML
32.5 INJECTION INTRAMUSCULAR 2 TIMES DAILY
Status: COMPLETED | OUTPATIENT
Start: 2023-07-27 | End: 2023-07-27

## 2023-07-25 RX ADMIN — ATORVASTATIN CALCIUM 40 MG: 40 TABLET, FILM COATED ORAL at 20:22

## 2023-07-25 RX ADMIN — APIXABAN 5 MG: 5 TABLET, FILM COATED ORAL at 08:05

## 2023-07-25 RX ADMIN — IPRATROPIUM BROMIDE AND ALBUTEROL SULFATE 1 DOSE: .5; 2.5 SOLUTION RESPIRATORY (INHALATION) at 06:29

## 2023-07-25 RX ADMIN — LORAZEPAM 4 MG: 2 INJECTION INTRAMUSCULAR; INTRAVENOUS at 21:44

## 2023-07-25 RX ADMIN — METOPROLOL SUCCINATE 25 MG: 25 TABLET, EXTENDED RELEASE ORAL at 00:21

## 2023-07-25 RX ADMIN — APIXABAN 5 MG: 5 TABLET, FILM COATED ORAL at 20:22

## 2023-07-25 RX ADMIN — MULTIPLE VITAMINS W/ MINERALS TAB 1 TABLET: TAB at 08:05

## 2023-07-25 RX ADMIN — METOPROLOL SUCCINATE 25 MG: 25 TABLET, EXTENDED RELEASE ORAL at 20:22

## 2023-07-25 RX ADMIN — QUETIAPINE FUMARATE 50 MG: 25 TABLET ORAL at 20:27

## 2023-07-25 RX ADMIN — ARFORMOTEROL TARTRATE 15 MCG: 15 SOLUTION RESPIRATORY (INHALATION) at 18:38

## 2023-07-25 RX ADMIN — APIXABAN 5 MG: 5 TABLET, FILM COATED ORAL at 00:21

## 2023-07-25 RX ADMIN — IPRATROPIUM BROMIDE AND ALBUTEROL SULFATE 1 DOSE: .5; 2.5 SOLUTION RESPIRATORY (INHALATION) at 15:04

## 2023-07-25 RX ADMIN — THIAMINE HCL TAB 100 MG 100 MG: 100 TAB at 08:05

## 2023-07-25 RX ADMIN — Medication 10 ML: at 08:06

## 2023-07-25 RX ADMIN — IPRATROPIUM BROMIDE AND ALBUTEROL SULFATE 1 DOSE: .5; 2.5 SOLUTION RESPIRATORY (INHALATION) at 18:38

## 2023-07-25 RX ADMIN — ARFORMOTEROL TARTRATE 15 MCG: 15 SOLUTION RESPIRATORY (INHALATION) at 06:29

## 2023-07-25 RX ADMIN — QUETIAPINE FUMARATE 50 MG: 25 TABLET ORAL at 00:33

## 2023-07-25 RX ADMIN — TRAZODONE HYDROCHLORIDE 100 MG: 50 TABLET ORAL at 20:22

## 2023-07-25 RX ADMIN — IPRATROPIUM BROMIDE AND ALBUTEROL SULFATE 1 DOSE: .5; 2.5 SOLUTION RESPIRATORY (INHALATION) at 09:34

## 2023-07-25 RX ADMIN — LORAZEPAM 4 MG: 2 INJECTION INTRAMUSCULAR; INTRAVENOUS at 20:22

## 2023-07-25 RX ADMIN — MAGNESIUM SULFATE HEPTAHYDRATE 1000 MG: 1 INJECTION, SOLUTION INTRAVENOUS at 00:06

## 2023-07-25 RX ADMIN — BUDESONIDE 500 MCG: 0.5 SUSPENSION RESPIRATORY (INHALATION) at 18:38

## 2023-07-25 RX ADMIN — LORAZEPAM 1 MG: 2 INJECTION INTRAMUSCULAR; INTRAVENOUS at 10:24

## 2023-07-25 RX ADMIN — LORAZEPAM 3 MG: 2 INJECTION INTRAMUSCULAR; INTRAVENOUS at 00:34

## 2023-07-25 RX ADMIN — PHENOBARBITAL SODIUM 65 MG: 65 INJECTION INTRAMUSCULAR at 14:17

## 2023-07-25 RX ADMIN — Medication 10 ML: at 00:22

## 2023-07-25 RX ADMIN — BUDESONIDE 500 MCG: 0.5 SUSPENSION RESPIRATORY (INHALATION) at 06:29

## 2023-07-25 RX ADMIN — ATORVASTATIN CALCIUM 40 MG: 40 TABLET, FILM COATED ORAL at 00:21

## 2023-07-25 RX ADMIN — LORAZEPAM 2 MG: 2 INJECTION INTRAMUSCULAR; INTRAVENOUS at 17:47

## 2023-07-25 RX ADMIN — LORAZEPAM 3 MG: 2 INJECTION INTRAMUSCULAR; INTRAVENOUS at 13:59

## 2023-07-25 RX ADMIN — LORAZEPAM 4 MG: 2 INJECTION INTRAMUSCULAR; INTRAVENOUS at 23:35

## 2023-07-25 RX ADMIN — LORAZEPAM 4 MG: 2 INJECTION INTRAMUSCULAR; INTRAVENOUS at 19:08

## 2023-07-25 RX ADMIN — FOLIC ACID 1 MG: 1 TABLET ORAL at 08:05

## 2023-07-25 RX ADMIN — LORAZEPAM 3 MG: 2 INJECTION INTRAMUSCULAR; INTRAVENOUS at 16:48

## 2023-07-25 RX ADMIN — LORAZEPAM 2 MG: 2 INJECTION INTRAMUSCULAR; INTRAVENOUS at 11:40

## 2023-07-25 RX ADMIN — LORAZEPAM 3 MG: 2 INJECTION INTRAMUSCULAR; INTRAVENOUS at 03:16

## 2023-07-25 RX ADMIN — SODIUM CHLORIDE, POTASSIUM CHLORIDE, SODIUM LACTATE AND CALCIUM CHLORIDE: 600; 310; 30; 20 INJECTION, SOLUTION INTRAVENOUS at 02:43

## 2023-07-25 RX ADMIN — METOPROLOL SUCCINATE 25 MG: 25 TABLET, EXTENDED RELEASE ORAL at 08:05

## 2023-07-25 RX ADMIN — PHENOBARBITAL SODIUM 65 MG: 65 INJECTION INTRAMUSCULAR at 05:54

## 2023-07-25 RX ADMIN — PHENOBARBITAL SODIUM 65 MG: 65 INJECTION INTRAMUSCULAR at 23:09

## 2023-07-25 ASSESSMENT — PAIN SCALES - GENERAL: PAINLEVEL_OUTOF10: 0

## 2023-07-25 NOTE — PROGRESS NOTES
4 Eyes Skin Assessment     NAME:  Eva Ramirez  YOB: 1960  MEDICAL RECORD NUMBER:  [id number]    The patient is being assessed for  Admission    I agree that at least one RN has performed a thorough Head to Toe Skin Assessment on the patient. ALL assessment sites listed below have been assessed. Areas assessed by both nurses:    Head, Face, Ears, Shoulders, Back, Chest, Arms, Elbows, Hands, Sacrum. Buttock, Coccyx, Ischium, Legs. Feet and Heels, and Under Medical Devices         Does the Patient have a Wound?  No noted wound(s)       Nikko Prevention initiated by RN: No  Wound Care Orders initiated by RN: No    Pressure Injury (Stage 3,4, Unstageable, DTI, NWPT, and Complex wounds) if present, place Wound referral order by RN under : No    New Ostomies, if present place, Ostomy referral order under : No     Nurse 1 eSignature: Electronically signed by Earl Cohen RN on 7/25/23 at 6:33 AM EDT    **SHARE this note so that the co-signing nurse can place an eSignature**    Nurse 2 eSignature: Electronically signed by Lupe Valle RN on 7/25/23 at 6:34 AM EDT

## 2023-07-25 NOTE — CARE COORDINATION
Peer Recovery Support Note    Name: Kyle Blanco  Date: 7/25/2023    Chief Complaint   Patient presents with    Shortness of Breath     Feels more short of breath than usual    Other     Failure to thrive- states he's not taking care of himself or bathing    Alcohol Problem     Drinking one liter of vodka daily       Peer Support met with patient. [x] Support and education provided  [] Resources provided   [] Treatment referral:   [] Other:   [] Patient declined peer recovery services     Referred By: Chiquita Diaz ()    Notes: Peer went and spoke with pt in hospital room bedside. Pt has been drinking alcohol as far back as he can remember. Pt also informs peer that he can't really walk. With everything going on with pt there is no inpatient treatment center that will take him. This is also out of my league. After pt gets detox in the hospital maybe a nursing home?      Signed: Maggie Monsivais, 7/25/2023

## 2023-07-25 NOTE — PROGRESS NOTES
Physical Therapy  Physical Therapy Initial Evaluation/Plan of Care    Room #:  5012/9575-24  Patient Name: Liam Sherman  YOB: 1960  MRN: [id number]    Date of Service: 7/25/2023     Tentative placement recommendation: Subacute Rehab  Equipment recommendation: To be determined      Evaluating Physical Therapist: Marek Gusman PT, DPT #[id number]      Specific Provider Orders/Date/Referring Provider :     07/25/23 1130    PT eval and treat  Start:  07/25/23 1130,   End:  07/25/23 1130,   ONE TIME,   Standing Count:  1 Occurrences,   Flossie Kayser, MD Acknowledge New     Admitting Diagnosis:   Alcohol withdrawal syndrome with complication, with unspecified complication [facility]) [A17.272]      Surgery: none      Patient Active Problem List   Diagnosis    Right adrenal mass ([address])    Lesion of right native kidney    Essential hypertension    Type 2 diabetes mellitus with hyperglycemia, without long-term current use of insulin (HCC)    Chronic obstructive pulmonary disease (HCC)    Hyperlipidemia    Macrocytosis    Coronary artery disease involving native coronary artery of native heart without angina pectoris    Depression with anxiety    Psychophysiological insomnia    Tobacco dependency    Atrial fibrillation with controlled ventricular rate (HCC)    Alcohol withdrawal syndrome without complication (HCC)    JOHN (obstructive sleep apnea)    Obesity (BMI 30.0-34. 9)    Acute on chronic heart failure with preserved ejection fraction (HCC)    Alcoholic liver disease (HCC)    Thrombocytopenia (HCC)    Alcohol withdrawal syndrome with complication, with unspecified complication ([address])        ASSESSMENT of Current Deficits Patient exhibits decreased strength, balance, and endurance impairing functional mobility, transfers, gait , gait distance, and tolerance to activity.  Pt required ModA for transfers and short ambulation in room with poor safety awareness with latent responses, lethargy, and following
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
## 2023-07-26 LAB
METER GLUCOSE: 122 MG/DL (ref 74–99)
METER GLUCOSE: 134 MG/DL (ref 74–99)

## 2023-07-26 PROCEDURE — 2060000000 HC ICU INTERMEDIATE R&B

## 2023-07-26 PROCEDURE — 6370000000 HC RX 637 (ALT 250 FOR IP)

## 2023-07-26 PROCEDURE — 6360000002 HC RX W HCPCS: Performed by: INTERNAL MEDICINE

## 2023-07-26 PROCEDURE — 6370000000 HC RX 637 (ALT 250 FOR IP): Performed by: FAMILY MEDICINE

## 2023-07-26 PROCEDURE — 94640 AIRWAY INHALATION TREATMENT: CPT

## 2023-07-26 PROCEDURE — 97165 OT EVAL LOW COMPLEX 30 MIN: CPT

## 2023-07-26 PROCEDURE — 97530 THERAPEUTIC ACTIVITIES: CPT | Performed by: PHYSICAL THERAPIST

## 2023-07-26 PROCEDURE — 6360000002 HC RX W HCPCS: Performed by: FAMILY MEDICINE

## 2023-07-26 PROCEDURE — 97110 THERAPEUTIC EXERCISES: CPT | Performed by: PHYSICAL THERAPIST

## 2023-07-26 PROCEDURE — 97530 THERAPEUTIC ACTIVITIES: CPT

## 2023-07-26 PROCEDURE — 2580000003 HC RX 258: Performed by: FAMILY MEDICINE

## 2023-07-26 PROCEDURE — 6360000002 HC RX W HCPCS

## 2023-07-26 PROCEDURE — 99232 SBSQ HOSP IP/OBS MODERATE 35: CPT | Performed by: INTERNAL MEDICINE

## 2023-07-26 PROCEDURE — 97535 SELF CARE MNGMENT TRAINING: CPT

## 2023-07-26 PROCEDURE — 82947 ASSAY GLUCOSE BLOOD QUANT: CPT

## 2023-07-26 RX ADMIN — APIXABAN 5 MG: 5 TABLET, FILM COATED ORAL at 09:31

## 2023-07-26 RX ADMIN — Medication 10 ML: at 20:27

## 2023-07-26 RX ADMIN — MULTIPLE VITAMINS W/ MINERALS TAB 1 TABLET: TAB at 09:31

## 2023-07-26 RX ADMIN — BUDESONIDE 500 MCG: 0.5 SUSPENSION RESPIRATORY (INHALATION) at 05:10

## 2023-07-26 RX ADMIN — IPRATROPIUM BROMIDE AND ALBUTEROL SULFATE 1 DOSE: .5; 2.5 SOLUTION RESPIRATORY (INHALATION) at 18:41

## 2023-07-26 RX ADMIN — QUETIAPINE FUMARATE 50 MG: 25 TABLET ORAL at 20:26

## 2023-07-26 RX ADMIN — ARFORMOTEROL TARTRATE 15 MCG: 15 SOLUTION RESPIRATORY (INHALATION) at 05:10

## 2023-07-26 RX ADMIN — IPRATROPIUM BROMIDE AND ALBUTEROL SULFATE 1 DOSE: .5; 2.5 SOLUTION RESPIRATORY (INHALATION) at 05:10

## 2023-07-26 RX ADMIN — METOPROLOL SUCCINATE 25 MG: 25 TABLET, EXTENDED RELEASE ORAL at 20:26

## 2023-07-26 RX ADMIN — LORAZEPAM 3 MG: 2 INJECTION INTRAMUSCULAR; INTRAVENOUS at 01:28

## 2023-07-26 RX ADMIN — THIAMINE HCL TAB 100 MG 100 MG: 100 TAB at 09:31

## 2023-07-26 RX ADMIN — PHENOBARBITAL SODIUM 32.5 MG: 65 INJECTION INTRAMUSCULAR at 20:27

## 2023-07-26 RX ADMIN — METOPROLOL SUCCINATE 25 MG: 25 TABLET, EXTENDED RELEASE ORAL at 09:33

## 2023-07-26 RX ADMIN — TRAZODONE HYDROCHLORIDE 100 MG: 50 TABLET ORAL at 20:27

## 2023-07-26 RX ADMIN — PHENOBARBITAL SODIUM 32.5 MG: 65 INJECTION INTRAMUSCULAR at 08:37

## 2023-07-26 RX ADMIN — ARFORMOTEROL TARTRATE 15 MCG: 15 SOLUTION RESPIRATORY (INHALATION) at 18:40

## 2023-07-26 RX ADMIN — IPRATROPIUM BROMIDE AND ALBUTEROL SULFATE 1 DOSE: .5; 2.5 SOLUTION RESPIRATORY (INHALATION) at 09:15

## 2023-07-26 RX ADMIN — APIXABAN 5 MG: 5 TABLET, FILM COATED ORAL at 20:27

## 2023-07-26 RX ADMIN — IPRATROPIUM BROMIDE AND ALBUTEROL SULFATE 1 DOSE: .5; 2.5 SOLUTION RESPIRATORY (INHALATION) at 13:06

## 2023-07-26 RX ADMIN — ATORVASTATIN CALCIUM 40 MG: 40 TABLET, FILM COATED ORAL at 20:27

## 2023-07-26 RX ADMIN — LORAZEPAM 2 MG: 2 INJECTION INTRAMUSCULAR; INTRAVENOUS at 18:24

## 2023-07-26 RX ADMIN — FOLIC ACID 1 MG: 1 TABLET ORAL at 09:32

## 2023-07-26 RX ADMIN — PHENOBARBITAL SODIUM 32.5 MG: 65 INJECTION INTRAMUSCULAR at 13:23

## 2023-07-26 RX ADMIN — PHENOBARBITAL SODIUM 32.5 MG: 65 INJECTION INTRAMUSCULAR at 16:46

## 2023-07-26 RX ADMIN — BUDESONIDE 500 MCG: 0.5 SUSPENSION RESPIRATORY (INHALATION) at 18:40

## 2023-07-26 RX ADMIN — Medication 10 ML: at 08:38

## 2023-07-26 NOTE — CARE COORDINATION
Case Management Assessment  Initial Evaluation    Date/Time of Evaluation: 7/26/2023 3:37 PM  Assessment Completed by: AVERY Montiel    If patient is discharged prior to next notation, then this note serves as note for discharge by case management. Patient Name: Raphael Arguello                   YOB: 1960  Diagnosis: Alcohol withdrawal syndrome with complication, with unspecified complication [facility]) [M59.885]                   Date / Time: 7/24/2023  3:21 PM    Patient Admission Status: Inpatient   Readmission Risk (Low < 19, Mod (19-27), High > 27): Readmission Risk Score: 20.4    Current PCP: Ulises Albrecht MD  PCP verified by CM?  Yes    Chart Reviewed: Yes      History Provided by: Patient  Patient Orientation: Alert and Oriented    Patient Cognition: Alert    Hospitalization in the last 30 days (Readmission):  Yes    Readmission Assessment  Number of Days since last admission?: 8-30 days  Previous Disposition: SNF  Who is being Interviewed: Patient  What was the patient's/caregiver's perception as to why they think they needed to return back to the hospital?: Other (Comment) (i can't take care of myself)  Did you visit your Primary Care Physician after you left the hospital, before you returned this time?: No  Why weren't you able to visit your PCP?: Did not have an appointment  Did you see a specialist, such as Cardiac, Pulmonary, Orthopedic Physician, etc. after you left the hospital?: No  Who advised the patient to return to the hospital?: Caregiver, Rex Moyer Staff  Does the patient report anything that got in the way of taking their medications?: No  In our efforts to provide the best possible care to you and others like you, can you think of anything that we could have done to help you after you left the hospital the first time, so that you might not have needed to return so soon?: Other (Comment) (I need to be able to walk)      Advance Directives:      Code Status: Full

## 2023-07-26 NOTE — PROGRESS NOTES
Physical Therapy  Physical Therapy Treatment Note/Plan of Care    Room #:  9915/8244-90  Patient Name: Amaury Torres  YOB: 1960  MRN: [id number]    Date of Service: 7/26/2023     Tentative placement recommendation: Subacute Rehab  Equipment recommendation: To be determined      Evaluating Physical Therapist: Araceli Blanchard PT, DPT #[id number]      Specific Provider Orders/Date/Referring Provider :     07/25/23 1130    PT eval and treat  Start:  07/25/23 1130,   End:  07/25/23 1130,   ONE TIME,   Standing Count:  1 Occurrences,   Julieta Magdaleno MD Acknowledge New     Admitting Diagnosis:   Alcohol withdrawal syndrome with complication, with unspecified complication [facility]) [V93.585]      Surgery: none  Visit Diagnoses         Codes    Alcohol withdrawal syndrome with complication ([address])    -  Primary F10.939    Lactic acidosis     E87.20            Patient Active Problem List   Diagnosis    Right adrenal mass ([address])    Lesion of right native kidney    Essential hypertension    Type 2 diabetes mellitus with hyperglycemia, without long-term current use of insulin (HCC)    Chronic obstructive pulmonary disease (HCC)    Hyperlipidemia    Macrocytosis    Coronary artery disease involving native coronary artery of native heart without angina pectoris    Depression with anxiety    Psychophysiological insomnia    Tobacco dependency    Atrial fibrillation with controlled ventricular rate (HCC)    Alcohol withdrawal syndrome without complication (HCC)    JOHN (obstructive sleep apnea)    Obesity (BMI 30.0-34. 9)    Acute on chronic heart failure with preserved ejection fraction (HCC)    Alcoholic liver disease (HCC)    Thrombocytopenia (HCC)    Alcohol withdrawal syndrome with complication, with unspecified complication ([address])    Exertional dyspnea    Atrial fibrillation with RVR (HCC)        ASSESSMENT of Current Deficits Patient exhibits decreased strength, balance, and endurance impairing functional Strength BUE:  refer to OT eval  RLE:  4-/5  LLE:  4-/5 BUE:  refer to OT eval  RLE:  4-/5  LLE:  4-/5 Increase strength in affected mm groups by 1/3 grade   Balance Sitting EOB:  fair with BUE support  Dynamic Standing:  fair - with [employer] Sitting EOB: fair   Dynamic Standing: fair wheeled walker    Sitting EOB:  fair +  Dynamic Standing: fair with [employer]     Patient is Alert & Oriented x person, place, time, and situation and follows directions    Sensation:  Patient  denies numbness/tingling   Edema:  no   Endurance: fair  -    Vitals: room air   Blood Pressure at rest  Blood Pressure during session    Heart Rate at rest 94 Heart Rate during session 100-125   SPO2 at rest 95%  SPO2 during session 94%     Patient education  Patient educated on role of Physical Therapy, risks of immobility, safety and plan of care, energy conservation,  importance of mobility while in hospital , ankle pumps, quad set and glut set for edema control, blood clot prevention, importance and purpose of adaptive device and adjusted to proper height for the patient. , safety , and positioning for skin integrity and comfort     Patient response to education:   Pt verbalized understanding Pt demonstrated skill Pt requires further education in this area   Yes Partial Yes      Treatment:  Patient practiced and was instructed/facilitated in the following treatment: Patient  Sat edge of bed 10 minutes with Minimal assist of 1 to increase dynamic sitting balance and activity tolerance. Pt performed bed mobility, transfers, ambulation in room, seated exercises. Therapeutic Exercises:  ankle pumps, heel raises, long arc quad, and seated marching  x 15 reps. At end of session, patient in bed with alarm call light and phone within reach,  all lines and tubes intact, nursing notified. Patient would benefit from continued skilled Physical Therapy to improve functional independence and quality of life.          Patient's/ family goals

## 2023-07-26 NOTE — PROGRESS NOTES
Occupational Therapy  OCCUPATIONAL THERAPY INITIAL EVALUATION    [facility] [address]      Date:2023                                                Patient Name: Amaury Torres  MRN: [id number]  : 1960  Room: [address] [place] [address]    Evaluating [address] OTR/L #[id number]    Referring Provider: Robin Martines MD  Specific Provider Orders/Date: OT eval and treat 23     Diagnosis: Alcohol withdrawal syndrome with complication, with unspecified complication ([address]) [E82.270]   Pt admitted to hospital on 23 for SOB, failure to thrive    Pertinent Medical History:  has a past medical history of Acute on chronic heart failure with preserved ejection fraction ([address]), CAD in native artery, Chronic obstructive pulmonary disease ([address]), Depression with anxiety, Diabetes mellitus ([address]), H/O cardiovascular stress test, Hyperlipidemia, and Hypertension.        Precautions:  Fall Risk, cognition, CIWA, bed alarm    Assessment of current deficits    [x] Functional mobility  [x]ADLs  [x] Strength               [x]Cognition    [x] Functional transfers   [x] IADLs         [x] Safety Awareness   [x]Endurance    [] Fine Coordination              [x] Balance      [] Vision/perception   []Sensation     []Gross Motor Coordination  [] ROM  [] Delirium                   [] Motor Control     OT PLAN OF CARE   OT POC based on physician orders, patient diagnosis and results of clinical assessment    Frequency/Duration 1-3 days/wk for 2 weeks PRN   Specific OT Treatment Interventions to include:   * Instruction/training on adapted ADL techniques and AE recommendations to increase functional independence within precautions       * Training on energy conservation strategies, correct breathing pattern and techniques to improve independence/tolerance for self-care routine  * Functional transfer/mobility training/DME recommendations for increased independence, safety, and fall prevention  * Patient/Family education to increase follow through with safety techniques and functional independence  * Recommendation of environmental modifications for increased safety with functional transfers/mobility and ADLs  * Cognitive retraining/development of therapeutic activities to improve problem solving, judgement, memory, and attention for increased safety/participation in ADL/IADL tasks  * Therapeutic exercise to improve motor endurance, ROM, and functional strength for ADLs/functional transfers  * Therapeutic activities to facilitate/challenge dynamic balance, stand tolerance for increased safety and independence with ADLs  * Therapeutic activities to facilitate gross/fine motor skills for increased independence with ADLs    Recommended Adaptive Equipment: TBD     Home Living: Pt lives alone in 1 floor home. 1 [address]    Bathroom setup: tub/shower    Equipment owned: w/w    Prior Level of Function: independent/mod I with ADLs/IADLs (however pt verbalized increased difficulty caring for self prior to admission); ambulated w/ w/w   Driving: no    Pain Level: Pt c/o no pain this session    Cognition: A&O: 3/4 (situational confusion noted);  Follows 1 step directions   Memory:  fair    Sequencing:  fair    Problem solving:  poor   Judgement/safety:  poor (+impulsivity-frequent cues provided for safety during functional tasks)     Functional Assessment:  AM-PAC Daily Activity Raw Score: 15/24   Initial Eval Status  Date: 7/26/23 Treatment Status  Date: STGs = LTGs  Time frame: 10-14 days   Feeding Stand by Assist   Modified Halliday    Grooming Minimal Assist  Washed face and hands  Modified Halliday    UB Dressing Minimal Assist   Donned gown  Modified [place]iday    LB Dressing Moderate Assist   B socks  Increased time required  Supervision    Bathing Moderate Assist  Supervision    Toileting Moderate Assist   Including hygiene task  Supervision    Bed Mobility  Supine to sit: Moderate Assist   Sit to supine: Minimal Assist

## 2023-07-26 NOTE — HOME CARE
Patient was an referral for home care prior to being admitted to hospital. Will need new [facility] [facility]  [address] prior to discharge if [facility] is needed.   Varsha Eden LPN, [address] [facility]

## 2023-07-27 LAB
ALBUMIN SERPL-MCNC: 3.4 G/DL (ref 3.5–5.2)
ALP SERPL-CCNC: 147 U/L (ref 40–129)
ALT SERPL-CCNC: 13 U/L (ref 0–40)
ANION GAP SERPL CALCULATED.3IONS-SCNC: 8 MMOL/L (ref 7–16)
AST SERPL-CCNC: 21 U/L (ref 0–39)
BASOPHILS # BLD: 0.03 K/UL (ref 0–0.2)
BASOPHILS NFR BLD: 0 % (ref 0–2)
BILIRUB SERPL-MCNC: 0.5 MG/DL (ref 0–1.2)
BUN SERPL-MCNC: 5 MG/DL (ref 6–23)
CALCIUM SERPL-MCNC: 8.8 MG/DL (ref 8.6–10.2)
CHLORIDE SERPL-SCNC: 99 MMOL/L (ref 98–107)
CO2 SERPL-SCNC: 30 MMOL/L (ref 22–29)
CREAT SERPL-MCNC: 0.8 MG/DL (ref 0.7–1.2)
EOSINOPHIL # BLD: 0.21 K/UL (ref 0.05–0.5)
EOSINOPHILS RELATIVE PERCENT: 3 % (ref 0–6)
ERYTHROCYTE [DISTWIDTH] IN BLOOD BY AUTOMATED COUNT: 14.5 % (ref 11.5–15)
GFR SERPL CREATININE-BSD FRML MDRD: >60 ML/MIN/1.73M2
GLUCOSE SERPL-MCNC: 112 MG/DL (ref 74–107)
HCT VFR BLD AUTO: 40.6 % (ref 37–54)
HGB BLD-MCNC: 13.3 G/DL (ref 12.5–16.5)
IMM GRANULOCYTES # BLD AUTO: 0.03 K/UL (ref 0–0.58)
IMM GRANULOCYTES NFR BLD: 0 % (ref 0–5)
LYMPHOCYTES NFR BLD: 2.53 K/UL (ref 1.5–4)
LYMPHOCYTES RELATIVE PERCENT: 35 % (ref 20–42)
MCH RBC QN AUTO: 34.3 PG (ref 26–35)
MCHC RBC AUTO-ENTMCNC: 32.8 G/DL (ref 32–34.5)
MCV RBC AUTO: 104.6 FL (ref 80–99.9)
METER GLUCOSE: 111 MG/DL (ref 74–99)
METER GLUCOSE: 146 MG/DL (ref 74–99)
MONOCYTES NFR BLD: 0.47 K/UL (ref 0.1–0.95)
MONOCYTES NFR BLD: 6 % (ref 2–12)
NEUTROPHILS NFR BLD: 55 % (ref 43–80)
NEUTS SEG NFR BLD: 4.03 K/UL (ref 1.8–7.3)
PLATELET, FLUORESCENCE: 104 K/UL (ref 130–450)
PMV BLD AUTO: 11.2 FL (ref 7–12)
POTASSIUM SERPL-SCNC: 3.5 MMOL/L (ref 3.5–5)
PROT SERPL-MCNC: 6.5 G/DL (ref 6.4–8.3)
RBC # BLD AUTO: 3.88 M/UL (ref 3.8–5.8)
SODIUM SERPL-SCNC: 137 MMOL/L (ref 132–146)
WBC OTHER # BLD: 7.3 K/UL (ref 4.5–11.5)

## 2023-07-27 PROCEDURE — 6360000002 HC RX W HCPCS: Performed by: INTERNAL MEDICINE

## 2023-07-27 PROCEDURE — 6370000000 HC RX 637 (ALT 250 FOR IP): Performed by: INTERNAL MEDICINE

## 2023-07-27 PROCEDURE — 97530 THERAPEUTIC ACTIVITIES: CPT

## 2023-07-27 PROCEDURE — 99232 SBSQ HOSP IP/OBS MODERATE 35: CPT | Performed by: INTERNAL MEDICINE

## 2023-07-27 PROCEDURE — 2580000003 HC RX 258

## 2023-07-27 PROCEDURE — 6360000002 HC RX W HCPCS: Performed by: FAMILY MEDICINE

## 2023-07-27 PROCEDURE — 2060000000 HC ICU INTERMEDIATE R&B

## 2023-07-27 PROCEDURE — 97110 THERAPEUTIC EXERCISES: CPT

## 2023-07-27 PROCEDURE — 82947 ASSAY GLUCOSE BLOOD QUANT: CPT

## 2023-07-27 PROCEDURE — 85027 COMPLETE CBC AUTOMATED: CPT

## 2023-07-27 PROCEDURE — 2580000003 HC RX 258: Performed by: FAMILY MEDICINE

## 2023-07-27 PROCEDURE — 80053 COMPREHEN METABOLIC PANEL: CPT

## 2023-07-27 PROCEDURE — 94640 AIRWAY INHALATION TREATMENT: CPT

## 2023-07-27 PROCEDURE — 6370000000 HC RX 637 (ALT 250 FOR IP)

## 2023-07-27 PROCEDURE — 36415 COLL VENOUS BLD VENIPUNCTURE: CPT

## 2023-07-27 PROCEDURE — 6370000000 HC RX 637 (ALT 250 FOR IP): Performed by: FAMILY MEDICINE

## 2023-07-27 RX ORDER — PANTOPRAZOLE SODIUM 40 MG/1
40 TABLET, DELAYED RELEASE ORAL
Status: DISCONTINUED | OUTPATIENT
Start: 2023-07-27 | End: 2023-08-03 | Stop reason: HOSPADM

## 2023-07-27 RX ADMIN — Medication 10 ML: at 07:54

## 2023-07-27 RX ADMIN — METOPROLOL SUCCINATE 25 MG: 25 TABLET, EXTENDED RELEASE ORAL at 20:15

## 2023-07-27 RX ADMIN — FOLIC ACID 1 MG: 1 TABLET ORAL at 07:52

## 2023-07-27 RX ADMIN — PHENOBARBITAL SODIUM 32.5 MG: 65 INJECTION INTRAMUSCULAR at 07:53

## 2023-07-27 RX ADMIN — PHENOBARBITAL SODIUM 32.5 MG: 65 INJECTION INTRAMUSCULAR at 20:16

## 2023-07-27 RX ADMIN — APIXABAN 5 MG: 5 TABLET, FILM COATED ORAL at 07:52

## 2023-07-27 RX ADMIN — LORAZEPAM 2 MG: 1 TABLET ORAL at 08:02

## 2023-07-27 RX ADMIN — MULTIPLE VITAMINS W/ MINERALS TAB 1 TABLET: TAB at 07:52

## 2023-07-27 RX ADMIN — Medication 10 ML: at 20:15

## 2023-07-27 RX ADMIN — ATORVASTATIN CALCIUM 40 MG: 40 TABLET, FILM COATED ORAL at 20:15

## 2023-07-27 RX ADMIN — METOPROLOL SUCCINATE 25 MG: 25 TABLET, EXTENDED RELEASE ORAL at 07:52

## 2023-07-27 RX ADMIN — BUDESONIDE 500 MCG: 0.5 SUSPENSION RESPIRATORY (INHALATION) at 17:57

## 2023-07-27 RX ADMIN — LORAZEPAM 3 MG: 1 TABLET ORAL at 10:13

## 2023-07-27 RX ADMIN — BUDESONIDE 500 MCG: 0.5 SUSPENSION RESPIRATORY (INHALATION) at 06:48

## 2023-07-27 RX ADMIN — THIAMINE HCL TAB 100 MG 100 MG: 100 TAB at 07:52

## 2023-07-27 RX ADMIN — IPRATROPIUM BROMIDE AND ALBUTEROL SULFATE 1 DOSE: .5; 2.5 SOLUTION RESPIRATORY (INHALATION) at 17:56

## 2023-07-27 RX ADMIN — ARFORMOTEROL TARTRATE 15 MCG: 15 SOLUTION RESPIRATORY (INHALATION) at 06:48

## 2023-07-27 RX ADMIN — QUETIAPINE FUMARATE 50 MG: 25 TABLET ORAL at 20:15

## 2023-07-27 RX ADMIN — ARFORMOTEROL TARTRATE 15 MCG: 15 SOLUTION RESPIRATORY (INHALATION) at 17:56

## 2023-07-27 RX ADMIN — LORAZEPAM 3 MG: 1 TABLET ORAL at 15:23

## 2023-07-27 RX ADMIN — PANTOPRAZOLE SODIUM 40 MG: 40 TABLET, DELAYED RELEASE ORAL at 17:34

## 2023-07-27 RX ADMIN — APIXABAN 5 MG: 5 TABLET, FILM COATED ORAL at 20:15

## 2023-07-27 RX ADMIN — IPRATROPIUM BROMIDE AND ALBUTEROL SULFATE 1 DOSE: .5; 2.5 SOLUTION RESPIRATORY (INHALATION) at 14:28

## 2023-07-27 RX ADMIN — IPRATROPIUM BROMIDE AND ALBUTEROL SULFATE 1 DOSE: .5; 2.5 SOLUTION RESPIRATORY (INHALATION) at 10:08

## 2023-07-27 RX ADMIN — IPRATROPIUM BROMIDE AND ALBUTEROL SULFATE 1 DOSE: .5; 2.5 SOLUTION RESPIRATORY (INHALATION) at 06:48

## 2023-07-27 NOTE — PROGRESS NOTES
Physical Therapy  Physical Therapy Treatment Note/Plan of Care    Room #:  4079/3917-36  Patient Name: Yandy Aiken  YOB: 1960  MRN: [id number]    Date of Service: 7/27/2023     Tentative placement recommendation: Subacute Rehab  Equipment recommendation: To be determined      Evaluating Physical Therapist: Dara Tatum PT, DPT #[id number]      Specific Provider Orders/Date/Referring Provider :     07/25/23 1130    PT eval and treat  Start:  07/25/23 1130,   End:  07/25/23 1130,   ONE TIME,   Standing Count:  1 Occurrences,   Kristie Lombardi MD Acknowledge New     Admitting Diagnosis:   Alcohol withdrawal syndrome with complication, with unspecified complication [facility]) [Y76.783]      Surgery: none  Visit Diagnoses         Codes    Alcohol withdrawal syndrome with complication ([address])    -  Primary F10.939    Lactic acidosis     E87.20            Patient Active Problem List   Diagnosis    Right adrenal mass ([address])    Lesion of right native kidney    Essential hypertension    Type 2 diabetes mellitus with hyperglycemia, without long-term current use of insulin (HCC)    Chronic obstructive pulmonary disease (HCC)    Hyperlipidemia    Macrocytosis    Coronary artery disease involving native coronary artery of native heart without angina pectoris    Depression with anxiety    Psychophysiological insomnia    Tobacco dependency    Atrial fibrillation with controlled ventricular rate (HCC)    Alcohol withdrawal syndrome without complication (HCC)    JOHN (obstructive sleep apnea)    Obesity (BMI 30.0-34. 9)    Acute on chronic heart failure with preserved ejection fraction (HCC)    Alcoholic liver disease (HCC)    Thrombocytopenia (HCC)    Alcohol withdrawal syndrome with complication, with unspecified complication ([address])    Exertional dyspnea    Atrial fibrillation with RVR (HCC)        ASSESSMENT of Current Deficits Patient exhibits decreased strength, balance, and endurance impairing functional flat bed without using bedrails?: A Little  How much help is needed moving to and from a bed to a chair?: A Little  How much help is needed standing up from a chair using your arms?: A Little  How much help is needed walking in hospital room?: A Little  How much help is needed climbing 3-5 steps with a railing?: Total  AM-PAC Inpatient Mobility Raw Score : 16  AM-PAC Inpatient T-Scale Score : 40.78  Mobility Inpatient CMS 0-100% Score: 54.16  Mobility Inpatient CMS G-Code Modifier : CK    Nursing cleared patient for PT treatment. Patient's family member present at start of session. OBJECTIVE:   Initial Evaluation  Date: 7/25/2023 Treatment Date:  7/26/23   Short Term/ Long Term   Goals   Was pt agreeable to Eval/treatment? Yes yes To be met in 3 days   Pain level   5/10  R wrist and headache No complaints    Bed Mobility  Using rails and head of bed elevated:     Rolling: Supervision     Supine to sit: Minimal assist of 1    Sit to supine: Minimal assist of 1    Scooting: Minimal assist of 1   Using rails and head of bed elevated:     Rolling: Supervision    Supine to sit: Supervision    Sit to supine: Not assessed patient in chair   Scooting: Supervision     Rolling: Independent    Supine to sit: Independent    Sit to supine: Independent    Scooting: Independent     Transfers Sit to stand:  Moderate assist of 1  Sit to stand: Minimal assist of 1 cues for hand placement   Sit to stand: Supervision     Ambulation     10 feet using  wheeled walker with Moderate assist of 1   for walker control, walker approximation, balance, upright, weight shift, multiplane instability, and safety 40 feet using  wheeled walker with Minimal assist of 1   Patient with shuffling steps, flexed posture, decreased lai, decreased base of support, and decreased step length and cues for upright posture, safety, proper hand placement, pacing, and obstacle negotiation     > 100 feet using  wheeled walker with Supervision     ROM Within
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
## 2023-07-28 LAB
ANION GAP SERPL CALCULATED.3IONS-SCNC: 8 MMOL/L (ref 7–16)
BUN SERPL-MCNC: 7 MG/DL (ref 6–23)
CALCIUM SERPL-MCNC: 8.9 MG/DL (ref 8.6–10.2)
CHLORIDE SERPL-SCNC: 99 MMOL/L (ref 98–107)
CO2 SERPL-SCNC: 30 MMOL/L (ref 22–29)
CREAT SERPL-MCNC: 0.9 MG/DL (ref 0.7–1.2)
ERYTHROCYTE [DISTWIDTH] IN BLOOD BY AUTOMATED COUNT: 14.4 % (ref 11.5–15)
FOLATE SERPL-MCNC: 3.7 NG/ML (ref 4.8–24.2)
GFR SERPL CREATININE-BSD FRML MDRD: >60 ML/MIN/1.73M2
GLUCOSE SERPL-MCNC: 108 MG/DL (ref 74–107)
HCT VFR BLD AUTO: 37.9 % (ref 37–54)
HGB BLD-MCNC: 12.6 G/DL (ref 12.5–16.5)
MAGNESIUM SERPL-MCNC: 1.3 MG/DL (ref 1.6–2.6)
MCH RBC QN AUTO: 35 PG (ref 26–35)
MCHC RBC AUTO-ENTMCNC: 33.2 G/DL (ref 32–34.5)
MCV RBC AUTO: 105.3 FL (ref 80–99.9)
METER GLUCOSE: 175 MG/DL (ref 74–99)
PLATELET # BLD AUTO: 88 K/UL (ref 130–450)
PLATELET CONFIRMATION: NORMAL
PMV BLD AUTO: 11.1 FL (ref 7–12)
POTASSIUM SERPL-SCNC: 3.5 MMOL/L (ref 3.5–5)
RBC # BLD AUTO: 3.6 M/UL (ref 3.8–5.8)
SODIUM SERPL-SCNC: 137 MMOL/L (ref 132–146)
VIT B12 SERPL-MCNC: 439 PG/ML (ref 211–946)
WBC OTHER # BLD: 7 K/UL (ref 4.5–11.5)

## 2023-07-28 PROCEDURE — 82746 ASSAY OF FOLIC ACID SERUM: CPT

## 2023-07-28 PROCEDURE — 2580000003 HC RX 258

## 2023-07-28 PROCEDURE — 36415 COLL VENOUS BLD VENIPUNCTURE: CPT

## 2023-07-28 PROCEDURE — 94640 AIRWAY INHALATION TREATMENT: CPT

## 2023-07-28 PROCEDURE — 97530 THERAPEUTIC ACTIVITIES: CPT

## 2023-07-28 PROCEDURE — 6370000000 HC RX 637 (ALT 250 FOR IP): Performed by: FAMILY MEDICINE

## 2023-07-28 PROCEDURE — 82607 VITAMIN B-12: CPT

## 2023-07-28 PROCEDURE — 82947 ASSAY GLUCOSE BLOOD QUANT: CPT

## 2023-07-28 PROCEDURE — 2580000003 HC RX 258: Performed by: FAMILY MEDICINE

## 2023-07-28 PROCEDURE — 85027 COMPLETE CBC AUTOMATED: CPT

## 2023-07-28 PROCEDURE — 2060000000 HC ICU INTERMEDIATE R&B

## 2023-07-28 PROCEDURE — 6370000000 HC RX 637 (ALT 250 FOR IP): Performed by: INTERNAL MEDICINE

## 2023-07-28 PROCEDURE — 6360000002 HC RX W HCPCS

## 2023-07-28 PROCEDURE — 6360000002 HC RX W HCPCS: Performed by: FAMILY MEDICINE

## 2023-07-28 PROCEDURE — 6360000002 HC RX W HCPCS: Performed by: INTERNAL MEDICINE

## 2023-07-28 PROCEDURE — 6370000000 HC RX 637 (ALT 250 FOR IP)

## 2023-07-28 PROCEDURE — 83735 ASSAY OF MAGNESIUM: CPT

## 2023-07-28 PROCEDURE — 99232 SBSQ HOSP IP/OBS MODERATE 35: CPT | Performed by: INTERNAL MEDICINE

## 2023-07-28 PROCEDURE — 80048 BASIC METABOLIC PNL TOTAL CA: CPT

## 2023-07-28 RX ORDER — MAGNESIUM SULFATE IN WATER 40 MG/ML
4000 INJECTION, SOLUTION INTRAVENOUS ONCE
Status: COMPLETED | OUTPATIENT
Start: 2023-07-28 | End: 2023-07-28

## 2023-07-28 RX ADMIN — IPRATROPIUM BROMIDE AND ALBUTEROL SULFATE 1 DOSE: .5; 2.5 SOLUTION RESPIRATORY (INHALATION) at 05:13

## 2023-07-28 RX ADMIN — METOPROLOL SUCCINATE 25 MG: 25 TABLET, EXTENDED RELEASE ORAL at 21:22

## 2023-07-28 RX ADMIN — LORAZEPAM 2 MG: 1 TABLET ORAL at 07:33

## 2023-07-28 RX ADMIN — LORAZEPAM 1 MG: 1 TABLET ORAL at 05:45

## 2023-07-28 RX ADMIN — HYDROXYZINE PAMOATE 50 MG: 25 CAPSULE ORAL at 21:22

## 2023-07-28 RX ADMIN — IPRATROPIUM BROMIDE AND ALBUTEROL SULFATE 1 DOSE: .5; 2.5 SOLUTION RESPIRATORY (INHALATION) at 17:33

## 2023-07-28 RX ADMIN — BUDESONIDE 500 MCG: 0.5 SUSPENSION RESPIRATORY (INHALATION) at 17:33

## 2023-07-28 RX ADMIN — LORAZEPAM 1 MG: 1 TABLET ORAL at 00:28

## 2023-07-28 RX ADMIN — PANTOPRAZOLE SODIUM 40 MG: 40 TABLET, DELAYED RELEASE ORAL at 05:45

## 2023-07-28 RX ADMIN — IPRATROPIUM BROMIDE AND ALBUTEROL SULFATE 1 DOSE: .5; 2.5 SOLUTION RESPIRATORY (INHALATION) at 10:01

## 2023-07-28 RX ADMIN — Medication 10 ML: at 21:24

## 2023-07-28 RX ADMIN — IPRATROPIUM BROMIDE AND ALBUTEROL SULFATE 1 DOSE: .5; 2.5 SOLUTION RESPIRATORY (INHALATION) at 14:02

## 2023-07-28 RX ADMIN — FOLIC ACID 1 MG: 1 TABLET ORAL at 07:33

## 2023-07-28 RX ADMIN — ARFORMOTEROL TARTRATE 15 MCG: 15 SOLUTION RESPIRATORY (INHALATION) at 05:13

## 2023-07-28 RX ADMIN — APIXABAN 5 MG: 5 TABLET, FILM COATED ORAL at 07:33

## 2023-07-28 RX ADMIN — ARFORMOTEROL TARTRATE 15 MCG: 15 SOLUTION RESPIRATORY (INHALATION) at 17:33

## 2023-07-28 RX ADMIN — METOPROLOL SUCCINATE 25 MG: 25 TABLET, EXTENDED RELEASE ORAL at 07:33

## 2023-07-28 RX ADMIN — Medication 10 ML: at 07:37

## 2023-07-28 RX ADMIN — BUDESONIDE 500 MCG: 0.5 SUSPENSION RESPIRATORY (INHALATION) at 05:13

## 2023-07-28 RX ADMIN — ACETAMINOPHEN 650 MG: 325 TABLET ORAL at 16:36

## 2023-07-28 RX ADMIN — LORAZEPAM 2 MG: 2 INJECTION INTRAMUSCULAR; INTRAVENOUS at 16:36

## 2023-07-28 RX ADMIN — LORAZEPAM 1 MG: 2 INJECTION INTRAMUSCULAR; INTRAVENOUS at 17:44

## 2023-07-28 RX ADMIN — MAGNESIUM SULFATE HEPTAHYDRATE 4000 MG: 40 INJECTION, SOLUTION INTRAVENOUS at 13:52

## 2023-07-28 RX ADMIN — QUETIAPINE FUMARATE 50 MG: 25 TABLET ORAL at 21:22

## 2023-07-28 RX ADMIN — ATORVASTATIN CALCIUM 40 MG: 40 TABLET, FILM COATED ORAL at 21:22

## 2023-07-28 RX ADMIN — LORAZEPAM 2 MG: 1 TABLET ORAL at 15:25

## 2023-07-28 RX ADMIN — PHENOBARBITAL SODIUM 16.2 MG: 65 INJECTION INTRAMUSCULAR at 21:22

## 2023-07-28 RX ADMIN — Medication 10 ML: at 21:26

## 2023-07-28 RX ADMIN — APIXABAN 5 MG: 5 TABLET, FILM COATED ORAL at 21:22

## 2023-07-28 RX ADMIN — PHENOBARBITAL SODIUM 16.2 MG: 65 INJECTION INTRAMUSCULAR at 07:33

## 2023-07-28 RX ADMIN — HYDROXYZINE PAMOATE 50 MG: 25 CAPSULE ORAL at 00:28

## 2023-07-28 RX ADMIN — THIAMINE HCL TAB 100 MG 100 MG: 100 TAB at 07:33

## 2023-07-28 RX ADMIN — SODIUM CHLORIDE, POTASSIUM CHLORIDE, SODIUM LACTATE AND CALCIUM CHLORIDE: 600; 310; 30; 20 INJECTION, SOLUTION INTRAVENOUS at 03:00

## 2023-07-28 RX ADMIN — MULTIPLE VITAMINS W/ MINERALS TAB 1 TABLET: TAB at 07:33

## 2023-07-28 ASSESSMENT — PAIN SCALES - GENERAL: PAINLEVEL_OUTOF10: 7

## 2023-07-28 ASSESSMENT — PAIN DESCRIPTION - LOCATION: LOCATION: HEAD

## 2023-07-28 NOTE — DISCHARGE INSTR - COC
Continuity of Care Form    Patient Name: Julieta Powers   :  1960  MRN:  [id number]    Admit date:  2023  Discharge date:  8/3/23    Code Status Order: Full Code   Advance Directives:     Admitting Physician:  Los Vann DO  PCP: Leary Peabody, MD    Discharging Nurse: [facility] Unit/Room#: 3154/4427-72  Discharging Unit Phone Number: [phone]    Emergency Contact:   Extended Emergency Contact Information  Primary Emergency Contact: [address] [place] [address] Phone: [phone]  Mobile Phone: [phone]  Relation: Child  Secondary Emergency Contact: [facility] Phone: [phone]  Mobile Phone: [phone]  Relation: Brother/Sister    Past Surgical History:  Past Surgical History:   Procedure Laterality Date    APPENDECTOMY      CARDIOVASCULAR STRESS TEST N/A 2021    Lexiscan stress test    COLONOSCOPY      LUNG SURGERY      from scar tissue    NECK SURGERY N/A 2020    EXCISION POSTERIOR SOFT TISSUE NEOPLASM NECK (CPT 93321) performed by Khoi Russ MD at [facility]       Immunization History: There is no immunization history for the selected administration types on file for this patient.     Active Problems:  Patient Active Problem List   Diagnosis Code    Right adrenal mass (HCC) E27.8    Lesion of right native kidney N28.9    Essential hypertension I10    Type 2 diabetes mellitus with hyperglycemia, without long-term current use of insulin (HCC) E11.65    Chronic obstructive pulmonary disease (HCC) J44.9    Hyperlipidemia E78.5    Macrocytosis D75.89    Coronary artery disease involving native coronary artery of native heart without angina pectoris I25.10    Depression with anxiety F41.8    Psychophysiological insomnia F51.04    Tobacco dependency F17.200    Atrial fibrillation with controlled ventricular rate (HCC) I48.91    Alcohol withdrawal syndrome without complication ([facility]) D42.937    JOHN (obstructive sleep apnea) G47.33    Obesity (BMI

## 2023-07-28 NOTE — PROGRESS NOTES
OT eval  RLE:  4-/5  LLE:  4-/5 BUE:  refer to OT eval  RLE:  4-/5  LLE:  4-/5 Increase strength in affected mm groups by 1/3 grade   Balance Sitting EOB:  fair with BUE support  Dynamic Standing:  fair - with [employer] Sitting EOB: fair +  Dynamic Standing: fair wheeled walker    Sitting EOB:  fair +  Dynamic Standing: fair with [employer]     Patient is Alert & Oriented x person, place, time, and situation and follows directions    Sensation:  Patient  denies numbness/tingling   Edema:  no   Endurance: fair      Vitals: room air   Blood Pressure at rest  Blood Pressure during session    Heart Rate at rest  Heart Rate during session    SPO2 at rest %  SPO2 during session %     Patient education  Patient educated on role of Physical Therapy, risks of immobility, safety and plan of care, energy conservation,  importance of mobility while in hospital , importance and purpose of adaptive device and adjusted to proper height for the patient. , safety , and seated/standing exercises      Patient response to education:   Pt verbalized understanding Pt demonstrated skill Pt requires further education in this area   Yes Partial Yes      Treatment:  Patient practiced and was instructed/facilitated in the following treatment: Patient performed supine exercise. Transferred to edge of bed and Sat edge of bed 5 minutes with Supervision  to increase dynamic sitting balance and activity tolerance. Patient stood, ambulated in room and back to edge of bed. Assisted back to supine and positioned for comfort. Therapeutic Exercises:  ankle pumps, quad sets, and heel slide  x 10 reps. At end of session, patient in bed with  refused bed alarm  call light and phone within reach,  all lines and tubes intact, nursing notified. Patient would benefit from continued skilled Physical Therapy to improve functional independence and quality of life.          Patient's/ family goals   rehab    Time in  1  Time out  216    Total Treatment Time  11 minutes    CPT codes:  Therapeutic activities (34337)   8 minutes  1 unit(s)  Therapeutic exercises (37175)   3 minutes  0 unit(s)    Christoph Santos, [facility]   #[id number]

## 2023-07-29 LAB
ANION GAP SERPL CALCULATED.3IONS-SCNC: 12 MMOL/L (ref 7–16)
BUN SERPL-MCNC: 6 MG/DL (ref 6–23)
CALCIUM SERPL-MCNC: 8.9 MG/DL (ref 8.6–10.2)
CHLORIDE SERPL-SCNC: 100 MMOL/L (ref 98–107)
CO2 SERPL-SCNC: 25 MMOL/L (ref 22–29)
CREAT SERPL-MCNC: 0.8 MG/DL (ref 0.7–1.2)
GFR SERPL CREATININE-BSD FRML MDRD: >60 ML/MIN/1.73M2
GLUCOSE SERPL-MCNC: 93 MG/DL (ref 74–99)
MAGNESIUM SERPL-MCNC: 1.9 MG/DL (ref 1.6–2.6)
METER GLUCOSE: 102 MG/DL (ref 74–99)
METER GLUCOSE: 147 MG/DL (ref 74–99)
POTASSIUM SERPL-SCNC: 3.7 MMOL/L (ref 3.5–5)
SODIUM SERPL-SCNC: 137 MMOL/L (ref 132–146)

## 2023-07-29 PROCEDURE — 6370000000 HC RX 637 (ALT 250 FOR IP): Performed by: FAMILY MEDICINE

## 2023-07-29 PROCEDURE — 83735 ASSAY OF MAGNESIUM: CPT

## 2023-07-29 PROCEDURE — 2580000003 HC RX 258

## 2023-07-29 PROCEDURE — 94640 AIRWAY INHALATION TREATMENT: CPT

## 2023-07-29 PROCEDURE — 6360000002 HC RX W HCPCS

## 2023-07-29 PROCEDURE — 6360000002 HC RX W HCPCS: Performed by: FAMILY MEDICINE

## 2023-07-29 PROCEDURE — 6370000000 HC RX 637 (ALT 250 FOR IP): Performed by: INTERNAL MEDICINE

## 2023-07-29 PROCEDURE — 6370000000 HC RX 637 (ALT 250 FOR IP)

## 2023-07-29 PROCEDURE — 80048 BASIC METABOLIC PNL TOTAL CA: CPT

## 2023-07-29 PROCEDURE — 2580000003 HC RX 258: Performed by: FAMILY MEDICINE

## 2023-07-29 PROCEDURE — 99232 SBSQ HOSP IP/OBS MODERATE 35: CPT | Performed by: INTERNAL MEDICINE

## 2023-07-29 PROCEDURE — 82947 ASSAY GLUCOSE BLOOD QUANT: CPT

## 2023-07-29 PROCEDURE — 36415 COLL VENOUS BLD VENIPUNCTURE: CPT

## 2023-07-29 PROCEDURE — 2060000000 HC ICU INTERMEDIATE R&B

## 2023-07-29 RX ORDER — PHENOBARBITAL 32.4 MG/1
32.4 TABLET ORAL 2 TIMES DAILY
Status: DISCONTINUED | OUTPATIENT
Start: 2023-07-29 | End: 2023-07-31

## 2023-07-29 RX ORDER — PHENOBARBITAL 32.4 MG/1
32.4 TABLET ORAL ONCE
Status: COMPLETED | OUTPATIENT
Start: 2023-07-29 | End: 2023-07-29

## 2023-07-29 RX ADMIN — FOLIC ACID 1 MG: 1 TABLET ORAL at 08:14

## 2023-07-29 RX ADMIN — IPRATROPIUM BROMIDE AND ALBUTEROL SULFATE 1 DOSE: .5; 2.5 SOLUTION RESPIRATORY (INHALATION) at 17:05

## 2023-07-29 RX ADMIN — LORAZEPAM 3 MG: 1 TABLET ORAL at 09:29

## 2023-07-29 RX ADMIN — IPRATROPIUM BROMIDE AND ALBUTEROL SULFATE 1 DOSE: .5; 2.5 SOLUTION RESPIRATORY (INHALATION) at 09:42

## 2023-07-29 RX ADMIN — QUETIAPINE FUMARATE 50 MG: 25 TABLET ORAL at 21:37

## 2023-07-29 RX ADMIN — Medication 10 ML: at 08:21

## 2023-07-29 RX ADMIN — BUDESONIDE 500 MCG: 0.5 SUSPENSION RESPIRATORY (INHALATION) at 17:05

## 2023-07-29 RX ADMIN — THIAMINE HCL TAB 100 MG 100 MG: 100 TAB at 08:14

## 2023-07-29 RX ADMIN — IPRATROPIUM BROMIDE AND ALBUTEROL SULFATE 1 DOSE: .5; 2.5 SOLUTION RESPIRATORY (INHALATION) at 13:19

## 2023-07-29 RX ADMIN — APIXABAN 5 MG: 5 TABLET, FILM COATED ORAL at 08:14

## 2023-07-29 RX ADMIN — PHENOBARBITAL 32.4 MG: 32.4 TABLET ORAL at 15:30

## 2023-07-29 RX ADMIN — LORAZEPAM 2 MG: 1 TABLET ORAL at 04:19

## 2023-07-29 RX ADMIN — Medication 10 ML: at 21:37

## 2023-07-29 RX ADMIN — PANTOPRAZOLE SODIUM 40 MG: 40 TABLET, DELAYED RELEASE ORAL at 04:19

## 2023-07-29 RX ADMIN — MULTIPLE VITAMINS W/ MINERALS TAB 1 TABLET: TAB at 08:15

## 2023-07-29 RX ADMIN — TRAZODONE HYDROCHLORIDE 100 MG: 50 TABLET ORAL at 21:37

## 2023-07-29 RX ADMIN — HYDROXYZINE PAMOATE 50 MG: 25 CAPSULE ORAL at 21:37

## 2023-07-29 RX ADMIN — ATORVASTATIN CALCIUM 40 MG: 40 TABLET, FILM COATED ORAL at 21:37

## 2023-07-29 RX ADMIN — PHENOBARBITAL 32.4 MG: 32.4 TABLET ORAL at 21:37

## 2023-07-29 RX ADMIN — LORAZEPAM 3 MG: 2 INJECTION INTRAMUSCULAR; INTRAVENOUS at 08:15

## 2023-07-29 RX ADMIN — LORAZEPAM 3 MG: 1 TABLET ORAL at 14:26

## 2023-07-29 RX ADMIN — METOPROLOL SUCCINATE 25 MG: 25 TABLET, EXTENDED RELEASE ORAL at 08:14

## 2023-07-29 RX ADMIN — ARFORMOTEROL TARTRATE 15 MCG: 15 SOLUTION RESPIRATORY (INHALATION) at 06:30

## 2023-07-29 RX ADMIN — ACETAMINOPHEN 650 MG: 325 TABLET ORAL at 18:27

## 2023-07-29 RX ADMIN — ACETAMINOPHEN 650 MG: 325 TABLET ORAL at 11:14

## 2023-07-29 RX ADMIN — METOPROLOL SUCCINATE 25 MG: 25 TABLET, EXTENDED RELEASE ORAL at 21:37

## 2023-07-29 RX ADMIN — LORAZEPAM 3 MG: 1 TABLET ORAL at 18:27

## 2023-07-29 RX ADMIN — ACETAMINOPHEN 650 MG: 325 TABLET ORAL at 04:19

## 2023-07-29 RX ADMIN — APIXABAN 5 MG: 5 TABLET, FILM COATED ORAL at 21:37

## 2023-07-29 RX ADMIN — LORAZEPAM 1 MG: 1 TABLET ORAL at 11:15

## 2023-07-29 RX ADMIN — IPRATROPIUM BROMIDE AND ALBUTEROL SULFATE 1 DOSE: .5; 2.5 SOLUTION RESPIRATORY (INHALATION) at 06:30

## 2023-07-29 RX ADMIN — BUDESONIDE 500 MCG: 0.5 SUSPENSION RESPIRATORY (INHALATION) at 06:30

## 2023-07-29 RX ADMIN — LORAZEPAM 2 MG: 1 TABLET ORAL at 20:15

## 2023-07-29 RX ADMIN — ARFORMOTEROL TARTRATE 15 MCG: 15 SOLUTION RESPIRATORY (INHALATION) at 17:05

## 2023-07-29 ASSESSMENT — PAIN DESCRIPTION - LOCATION
LOCATION: HEAD
LOCATION: HEAD

## 2023-07-29 ASSESSMENT — PAIN DESCRIPTION - DESCRIPTORS
DESCRIPTORS: ACHING
DESCRIPTORS: ACHING

## 2023-07-29 ASSESSMENT — PAIN SCALES - GENERAL
PAINLEVEL_OUTOF10: 5
PAINLEVEL_OUTOF10: 5

## 2023-07-29 ASSESSMENT — PAIN - FUNCTIONAL ASSESSMENT: PAIN_FUNCTIONAL_ASSESSMENT: PREVENTS OR INTERFERES SOME ACTIVE ACTIVITIES AND ADLS

## 2023-07-30 LAB
ANION GAP SERPL CALCULATED.3IONS-SCNC: 9 MMOL/L (ref 7–16)
BUN SERPL-MCNC: 10 MG/DL (ref 6–23)
CALCIUM SERPL-MCNC: 8.8 MG/DL (ref 8.6–10.2)
CHLORIDE SERPL-SCNC: 100 MMOL/L (ref 98–107)
CO2 SERPL-SCNC: 27 MMOL/L (ref 22–29)
CREAT SERPL-MCNC: 0.9 MG/DL (ref 0.7–1.2)
ERYTHROCYTE [DISTWIDTH] IN BLOOD BY AUTOMATED COUNT: 14.3 % (ref 11.5–15)
GFR SERPL CREATININE-BSD FRML MDRD: >60 ML/MIN/1.73M2
GLUCOSE SERPL-MCNC: 107 MG/DL (ref 74–107)
HCT VFR BLD AUTO: 39 % (ref 37–54)
HGB BLD-MCNC: 13 G/DL (ref 12.5–16.5)
MCH RBC QN AUTO: 35.1 PG (ref 26–35)
MCHC RBC AUTO-ENTMCNC: 33.3 G/DL (ref 32–34.5)
MCV RBC AUTO: 105.4 FL (ref 80–99.9)
PLATELET # BLD AUTO: 120 K/UL (ref 130–450)
PMV BLD AUTO: 11.2 FL (ref 7–12)
POTASSIUM SERPL-SCNC: 4.1 MMOL/L (ref 3.5–5)
RBC # BLD AUTO: 3.7 M/UL (ref 3.8–5.8)
SODIUM SERPL-SCNC: 136 MMOL/L (ref 132–146)
WBC OTHER # BLD: 7.3 K/UL (ref 4.5–11.5)

## 2023-07-30 PROCEDURE — 6370000000 HC RX 637 (ALT 250 FOR IP): Performed by: FAMILY MEDICINE

## 2023-07-30 PROCEDURE — 2580000003 HC RX 258: Performed by: FAMILY MEDICINE

## 2023-07-30 PROCEDURE — 99232 SBSQ HOSP IP/OBS MODERATE 35: CPT | Performed by: INTERNAL MEDICINE

## 2023-07-30 PROCEDURE — 6370000000 HC RX 637 (ALT 250 FOR IP): Performed by: INTERNAL MEDICINE

## 2023-07-30 PROCEDURE — 97530 THERAPEUTIC ACTIVITIES: CPT

## 2023-07-30 PROCEDURE — 94640 AIRWAY INHALATION TREATMENT: CPT

## 2023-07-30 PROCEDURE — 80048 BASIC METABOLIC PNL TOTAL CA: CPT

## 2023-07-30 PROCEDURE — 1200000000 HC SEMI PRIVATE

## 2023-07-30 PROCEDURE — 85027 COMPLETE CBC AUTOMATED: CPT

## 2023-07-30 PROCEDURE — 2580000003 HC RX 258

## 2023-07-30 PROCEDURE — 97110 THERAPEUTIC EXERCISES: CPT

## 2023-07-30 PROCEDURE — 36415 COLL VENOUS BLD VENIPUNCTURE: CPT

## 2023-07-30 PROCEDURE — 6370000000 HC RX 637 (ALT 250 FOR IP)

## 2023-07-30 PROCEDURE — 6360000002 HC RX W HCPCS: Performed by: FAMILY MEDICINE

## 2023-07-30 RX ADMIN — APIXABAN 5 MG: 5 TABLET, FILM COATED ORAL at 23:28

## 2023-07-30 RX ADMIN — METOPROLOL SUCCINATE 25 MG: 25 TABLET, EXTENDED RELEASE ORAL at 23:28

## 2023-07-30 RX ADMIN — METOPROLOL SUCCINATE 25 MG: 25 TABLET, EXTENDED RELEASE ORAL at 07:58

## 2023-07-30 RX ADMIN — BUDESONIDE 500 MCG: 0.5 SUSPENSION RESPIRATORY (INHALATION) at 06:52

## 2023-07-30 RX ADMIN — PHENOBARBITAL 32.4 MG: 32.4 TABLET ORAL at 23:28

## 2023-07-30 RX ADMIN — MULTIPLE VITAMINS W/ MINERALS TAB 1 TABLET: TAB at 07:58

## 2023-07-30 RX ADMIN — PANTOPRAZOLE SODIUM 40 MG: 40 TABLET, DELAYED RELEASE ORAL at 05:42

## 2023-07-30 RX ADMIN — IPRATROPIUM BROMIDE AND ALBUTEROL SULFATE 1 DOSE: .5; 2.5 SOLUTION RESPIRATORY (INHALATION) at 18:08

## 2023-07-30 RX ADMIN — LORAZEPAM 2 MG: 1 TABLET ORAL at 16:14

## 2023-07-30 RX ADMIN — IPRATROPIUM BROMIDE AND ALBUTEROL SULFATE 1 DOSE: .5; 2.5 SOLUTION RESPIRATORY (INHALATION) at 10:29

## 2023-07-30 RX ADMIN — FOLIC ACID 1 MG: 1 TABLET ORAL at 07:57

## 2023-07-30 RX ADMIN — LORAZEPAM 1 MG: 1 TABLET ORAL at 08:00

## 2023-07-30 RX ADMIN — IPRATROPIUM BROMIDE AND ALBUTEROL SULFATE 1 DOSE: .5; 2.5 SOLUTION RESPIRATORY (INHALATION) at 13:47

## 2023-07-30 RX ADMIN — ACETAMINOPHEN 650 MG: 325 TABLET ORAL at 07:58

## 2023-07-30 RX ADMIN — PHENOBARBITAL 32.4 MG: 32.4 TABLET ORAL at 07:57

## 2023-07-30 RX ADMIN — ATORVASTATIN CALCIUM 40 MG: 40 TABLET, FILM COATED ORAL at 23:28

## 2023-07-30 RX ADMIN — LORAZEPAM 1 MG: 1 TABLET ORAL at 11:13

## 2023-07-30 RX ADMIN — APIXABAN 5 MG: 5 TABLET, FILM COATED ORAL at 07:57

## 2023-07-30 RX ADMIN — LORAZEPAM 1 MG: 1 TABLET ORAL at 14:32

## 2023-07-30 RX ADMIN — Medication 10 ML: at 07:58

## 2023-07-30 RX ADMIN — BUDESONIDE 500 MCG: 0.5 SUSPENSION RESPIRATORY (INHALATION) at 18:08

## 2023-07-30 RX ADMIN — THIAMINE HCL TAB 100 MG 100 MG: 100 TAB at 11:13

## 2023-07-30 RX ADMIN — ARFORMOTEROL TARTRATE 15 MCG: 15 SOLUTION RESPIRATORY (INHALATION) at 06:52

## 2023-07-30 RX ADMIN — IPRATROPIUM BROMIDE AND ALBUTEROL SULFATE 1 DOSE: .5; 2.5 SOLUTION RESPIRATORY (INHALATION) at 06:52

## 2023-07-30 RX ADMIN — ARFORMOTEROL TARTRATE 15 MCG: 15 SOLUTION RESPIRATORY (INHALATION) at 18:08

## 2023-07-30 RX ADMIN — QUETIAPINE FUMARATE 50 MG: 25 TABLET ORAL at 23:28

## 2023-07-30 NOTE — PROGRESS NOTES
JORGE cardioversion 7/7  - continue Eliquis  - monitor telemetry  - metoprolol for rate control     Hyperglycemia but now qualifies as type II DM with A1c >/= to 6.5%  - last hgb a1c was 6.2% July of 2022  - checked Hgb A1c this admission and came back elevated at 6.8% which is c/w type II DM. - changed diet to carb control.  - monitor glucose     Hypomagnesemia  -Mg 1.3 on Friday. He was given 4 g of mag and repeat mag level up to 1.9 yesterday. Recheck Mg level tomorrow. HTN/HLD/CAD  - continue home antihypertensive, statin     COPD/JOHN  - duonebs  - brovana/pulmicort     Depression/Anxiety/Insomnia  - continue home psych meds     Disposition  -will need placement to [facility]. Apparently last admission he was only approved or 3 days due to mobility being too good but now appears to having more difficulty. SW looking into [facility] which can do both alcohol and physical rehab. [facility] accepted and precert pending. Medically stable for discharge. NOTE: This report was transcribed using voice recognition software. Every effort was made to ensure accuracy; however, inadvertent computerized transcription errors may be present.      Electronically signed by Robin Martines MD on 7/30/2023 at 1:30 PM

## 2023-07-30 NOTE — PROGRESS NOTES
Physical Therapy  Physical Therapy Treatment Note/Plan of Care    Room #:  0182/8663-47  Patient Name: Tarsha Orozco  YOB: 1960  MRN: [id number]    Date of Service: 7/30/2023     Tentative placement recommendation: Subacute Rehab  Equipment recommendation: To be determined      Evaluating Physical Therapist: Theo Stanley PT, DPT #[id number]      Specific Provider Orders/Date/Referring Provider :     07/25/23 1130    PT eval and treat  Start:  07/25/23 1130,   End:  07/25/23 1130,   ONE TIME,   Standing Count:  1 Occurrences,   Cornel Whitmore MD Acknowledge New     Admitting Diagnosis:   Alcohol withdrawal syndrome with complication, with unspecified complication [facility]) [H59.344]      Surgery: none  Visit Diagnoses         Codes    Alcohol withdrawal syndrome with complication ([address])    -  Primary F10.939    Lactic acidosis     E87.20            Patient Active Problem List   Diagnosis    Right adrenal mass ([address])    Lesion of right native kidney    Essential hypertension    Type 2 diabetes mellitus with hyperglycemia, without long-term current use of insulin (HCC)    Chronic obstructive pulmonary disease (HCC)    Hyperlipidemia    Macrocytosis    Coronary artery disease involving native coronary artery of native heart without angina pectoris    Depression with anxiety    Psychophysiological insomnia    Tobacco dependency    Atrial fibrillation with controlled ventricular rate (HCC)    Alcohol withdrawal syndrome without complication (HCC)    JOHN (obstructive sleep apnea)    Obesity (BMI 30.0-34. 9)    Acute on chronic heart failure with preserved ejection fraction (HCC)    Alcoholic liver disease (HCC)    Thrombocytopenia (HCC)    Alcohol withdrawal syndrome with complication, with unspecified complication ([address])    Exertional dyspnea    Atrial fibrillation with RVR (HCC)        ASSESSMENT of Current Deficits Patient exhibits decreased strength, balance, and endurance impairing functional and from a bed to a chair?: A Little  How much help is needed standing up from a chair using your arms?: A Little  How much help is needed walking in hospital room?: A Little  How much help is needed climbing 3-5 steps with a railing?: A Lot  AM-PAC Inpatient Mobility Raw Score : 17  AM-PAC Inpatient T-Scale Score : 42.13  Mobility Inpatient CMS 0-100% Score: 50.57  Mobility Inpatient CMS G-Code Modifier : CK    Nursing cleared patient for PT treatment. OBJECTIVE:   Initial Evaluation  Date: 7/25/2023 Treatment Date:  7/26/23   Short Term/ Long Term   Goals   Was pt agreeable to Eval/treatment? Yes yes To be met in 3 days   Pain level   5/10  R wrist and headache None reported    Bed Mobility  Using rails and head of bed elevated:     Rolling: Supervision     Supine to sit: Minimal assist of 1    Sit to supine: Minimal assist of 1    Scooting: Minimal assist of 1   Using rails and head of bed elevated:     Rolling: Supervision    Supine to sit: Supervision    Sit to supine: Not assessed patient in chair   Scooting: Supervision     Rolling: Independent    Supine to sit: Independent    Sit to supine: Independent    Scooting: Independent     Transfers Sit to stand:  Moderate assist of 1  Sit to stand: Supervision  cues for hand placement   Sit to stand: Supervision     Ambulation     10 feet using  wheeled walker with Moderate assist of 1   for walker control, walker approximation, balance, upright, weight shift, multiplane instability, and safety 80 feet, 2x15 feet using  wheeled walker with Minimal assist of 1   cues for upright posture, safety, and pacing     > 100 feet using  wheeled walker with Supervision     ROM Within functional limits   Within functional limits  Increase range of motion 10% of affected joints    Strength BUE:  refer to OT eval  RLE:  4-/5  LLE:  4-/5 BUE:  refer to OT eval  RLE:  4-/5  LLE:  4-/5 Increase strength in affected mm groups by 1/3 grade   Balance Sitting EOB:  fair with BUE

## 2023-07-31 LAB
ANION GAP SERPL CALCULATED.3IONS-SCNC: 10 MMOL/L (ref 7–16)
BUN SERPL-MCNC: 11 MG/DL (ref 6–23)
CALCIUM SERPL-MCNC: 8.9 MG/DL (ref 8.6–10.2)
CHLORIDE SERPL-SCNC: 100 MMOL/L (ref 98–107)
CO2 SERPL-SCNC: 26 MMOL/L (ref 22–29)
CREAT SERPL-MCNC: 0.9 MG/DL (ref 0.7–1.2)
ERYTHROCYTE [DISTWIDTH] IN BLOOD BY AUTOMATED COUNT: 14.4 % (ref 11.5–15)
GFR SERPL CREATININE-BSD FRML MDRD: >60 ML/MIN/1.73M2
GLUCOSE SERPL-MCNC: 116 MG/DL (ref 74–107)
HCT VFR BLD AUTO: 37.9 % (ref 37–54)
HGB BLD-MCNC: 12.6 G/DL (ref 12.5–16.5)
MAGNESIUM SERPL-MCNC: 1.6 MG/DL (ref 1.6–2.6)
MCH RBC QN AUTO: 34.8 PG (ref 26–35)
MCHC RBC AUTO-ENTMCNC: 33.2 G/DL (ref 32–34.5)
MCV RBC AUTO: 104.7 FL (ref 80–99.9)
PLATELET # BLD AUTO: 144 K/UL (ref 130–450)
PMV BLD AUTO: 11 FL (ref 7–12)
POTASSIUM SERPL-SCNC: 4 MMOL/L (ref 3.5–5)
RBC # BLD AUTO: 3.62 M/UL (ref 3.8–5.8)
SODIUM SERPL-SCNC: 136 MMOL/L (ref 132–146)
WBC OTHER # BLD: 7.1 K/UL (ref 4.5–11.5)

## 2023-07-31 PROCEDURE — 6370000000 HC RX 637 (ALT 250 FOR IP): Performed by: FAMILY MEDICINE

## 2023-07-31 PROCEDURE — 94640 AIRWAY INHALATION TREATMENT: CPT

## 2023-07-31 PROCEDURE — 2580000003 HC RX 258

## 2023-07-31 PROCEDURE — 97110 THERAPEUTIC EXERCISES: CPT

## 2023-07-31 PROCEDURE — 85027 COMPLETE CBC AUTOMATED: CPT

## 2023-07-31 PROCEDURE — 6370000000 HC RX 637 (ALT 250 FOR IP): Performed by: INTERNAL MEDICINE

## 2023-07-31 PROCEDURE — 36415 COLL VENOUS BLD VENIPUNCTURE: CPT

## 2023-07-31 PROCEDURE — 1200000000 HC SEMI PRIVATE

## 2023-07-31 PROCEDURE — 6360000002 HC RX W HCPCS: Performed by: FAMILY MEDICINE

## 2023-07-31 PROCEDURE — 97530 THERAPEUTIC ACTIVITIES: CPT

## 2023-07-31 PROCEDURE — 99232 SBSQ HOSP IP/OBS MODERATE 35: CPT | Performed by: INTERNAL MEDICINE

## 2023-07-31 PROCEDURE — 6370000000 HC RX 637 (ALT 250 FOR IP)

## 2023-07-31 PROCEDURE — 83735 ASSAY OF MAGNESIUM: CPT

## 2023-07-31 PROCEDURE — 2580000003 HC RX 258: Performed by: FAMILY MEDICINE

## 2023-07-31 PROCEDURE — 80048 BASIC METABOLIC PNL TOTAL CA: CPT

## 2023-07-31 RX ORDER — PHENOBARBITAL 32.4 MG/1
16.2 TABLET ORAL 2 TIMES DAILY
Status: COMPLETED | OUTPATIENT
Start: 2023-07-31 | End: 2023-08-01

## 2023-07-31 RX ADMIN — HYDROXYZINE PAMOATE 50 MG: 25 CAPSULE ORAL at 22:45

## 2023-07-31 RX ADMIN — BUDESONIDE 500 MCG: 0.5 SUSPENSION RESPIRATORY (INHALATION) at 17:14

## 2023-07-31 RX ADMIN — FOLIC ACID 1 MG: 1 TABLET ORAL at 09:26

## 2023-07-31 RX ADMIN — IPRATROPIUM BROMIDE AND ALBUTEROL SULFATE 1 DOSE: .5; 2.5 SOLUTION RESPIRATORY (INHALATION) at 09:48

## 2023-07-31 RX ADMIN — ATORVASTATIN CALCIUM 40 MG: 40 TABLET, FILM COATED ORAL at 22:36

## 2023-07-31 RX ADMIN — METOPROLOL SUCCINATE 25 MG: 25 TABLET, EXTENDED RELEASE ORAL at 09:26

## 2023-07-31 RX ADMIN — MULTIPLE VITAMINS W/ MINERALS TAB 1 TABLET: TAB at 09:26

## 2023-07-31 RX ADMIN — ACETAMINOPHEN 650 MG: 325 TABLET ORAL at 09:28

## 2023-07-31 RX ADMIN — METOPROLOL SUCCINATE 25 MG: 25 TABLET, EXTENDED RELEASE ORAL at 22:35

## 2023-07-31 RX ADMIN — ARFORMOTEROL TARTRATE 15 MCG: 15 SOLUTION RESPIRATORY (INHALATION) at 17:14

## 2023-07-31 RX ADMIN — PHENOBARBITAL 16.2 MG: 32.4 TABLET ORAL at 22:36

## 2023-07-31 RX ADMIN — PHENOBARBITAL 32.4 MG: 32.4 TABLET ORAL at 09:26

## 2023-07-31 RX ADMIN — THIAMINE HCL TAB 100 MG 100 MG: 100 TAB at 09:26

## 2023-07-31 RX ADMIN — PANTOPRAZOLE SODIUM 40 MG: 40 TABLET, DELAYED RELEASE ORAL at 06:56

## 2023-07-31 RX ADMIN — BUDESONIDE 500 MCG: 0.5 SUSPENSION RESPIRATORY (INHALATION) at 06:28

## 2023-07-31 RX ADMIN — APIXABAN 5 MG: 5 TABLET, FILM COATED ORAL at 22:36

## 2023-07-31 RX ADMIN — APIXABAN 5 MG: 5 TABLET, FILM COATED ORAL at 09:26

## 2023-07-31 RX ADMIN — ARFORMOTEROL TARTRATE 15 MCG: 15 SOLUTION RESPIRATORY (INHALATION) at 06:28

## 2023-07-31 RX ADMIN — LORAZEPAM 2 MG: 1 TABLET ORAL at 22:45

## 2023-07-31 RX ADMIN — Medication 10 ML: at 22:49

## 2023-07-31 RX ADMIN — Medication 10 ML: at 09:30

## 2023-07-31 RX ADMIN — Medication 10 ML: at 22:39

## 2023-07-31 RX ADMIN — IPRATROPIUM BROMIDE AND ALBUTEROL SULFATE 1 DOSE: .5; 2.5 SOLUTION RESPIRATORY (INHALATION) at 13:15

## 2023-07-31 RX ADMIN — QUETIAPINE FUMARATE 50 MG: 25 TABLET ORAL at 22:36

## 2023-07-31 RX ADMIN — LORAZEPAM 1 MG: 1 TABLET ORAL at 12:22

## 2023-07-31 RX ADMIN — IPRATROPIUM BROMIDE AND ALBUTEROL SULFATE 1 DOSE: .5; 2.5 SOLUTION RESPIRATORY (INHALATION) at 06:28

## 2023-07-31 RX ADMIN — IPRATROPIUM BROMIDE AND ALBUTEROL SULFATE 1 DOSE: .5; 2.5 SOLUTION RESPIRATORY (INHALATION) at 17:14

## 2023-07-31 ASSESSMENT — PAIN SCALES - GENERAL: PAINLEVEL_OUTOF10: 5

## 2023-07-31 ASSESSMENT — PAIN DESCRIPTION - DESCRIPTORS: DESCRIPTORS: ACHING;DISCOMFORT;NAGGING

## 2023-07-31 ASSESSMENT — PAIN DESCRIPTION - LOCATION: LOCATION: HEAD
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
## 2023-07-31 NOTE — PLAN OF CARE
Patient's chart updated to reflect:    HF discharge instructions.  -Orders: 2 gram sodium diet, daily weights, I/O.  -History of HF, not primary admission Dx, but is a 30 readmission. Patient admitted for treatment of Alcohol withdrawal, Inability to ambulate, Afib  -Inbox message sent to Cardiologist for HFU appointment to be scheduled within 7 days of hospital discharge.  Discharge disposition is for [facility] at [facility] MSN, RN  Heart Failure Navigator
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
## 2023-07-31 NOTE — PROGRESS NOTES
[address] Hospitalist   Progress Note    Admitting Date and Time: 7/24/2023  3:21 PM  Admit Dx: Alcohol withdrawal syndrome with complication, with unspecified complication ([address]) [E13.034]    Subjective/interval history:    7/25: Pt feels shaky. Nurses at bedside  Per RN, patient being medicated with Phenobarb as scheduled. 7/26: Pt resting in bed. He was asking for Ativan. Per RN, he did work with PT; with RN he was unsteady with nursing. 7/27: Pt sitting up eating dinner. He is asking for Protonix to be ordered. Per RN,  medicated as per STACI    7/28: Pt voices no complaints. Tolerating meds. 7/29: Pt feeling very shaky and anxious today. Per RN,  STACI elevated this AM at 19. He required 7 mg Ativan to get relief. 7/30: Patient lying in bed. Overall, feels better today. Per RN: Doing better today with resumption of phenobarbital yesterday afternoon. 7/31: Patient is awake, alert, denies any complaints. Awaiting subacute rehab placement.      PHENobarbital  16.2 mg Oral BID    pantoprazole  40 mg Oral QAM AC    sodium chloride flush  5-40 mL IntraVENous 2 times per day    thiamine  100 mg Oral Daily    folic acid  1 mg Oral Daily    sodium chloride flush  5-40 mL IntraVENous 2 times per day    multivitamin  1 tablet Oral Daily    apixaban  5 mg Oral BID    atorvastatin  40 mg Oral Nightly    metoprolol succinate  25 mg Oral BID    nicotine  1 patch TransDERmal Q24H    QUEtiapine  50 mg Oral Nightly    arformoterol tartrate  15 mcg Nebulization BID RT    budesonide  0.5 mg Nebulization BID RT    ipratropium 0.5 mg-albuterol 2.5 mg  1 Dose Inhalation Q4H WA RT     sodium chloride flush, 5-40 mL, PRN  LORazepam, 1 mg, Q1H PRN   Or  LORazepam, 1 mg, Q1H PRN   Or  LORazepam, 2 mg, Q1H PRN   Or  LORazepam, 2 mg, Q1H PRN   Or  LORazepam, 3 mg, Q1H PRN   Or  LORazepam, 3 mg, Q1H PRN   Or  LORazepam, 4 mg, Q1H PRN   Or  LORazepam, 4 mg, Q1H PRN  sodium chloride flush, 5-40 mL, >/= to 6.5%  - last hgb a1c was 6.2% July of 2022  - checked Hgb A1c this admission and came back elevated at 6.8% which is c/w type II DM. - changed diet to carb control.  - monitor glucose     Hypomagnesemia  -resolved with IV replacement     HTN/HLD/CAD  - continue home antihypertensive, statin     COPD/JOHN  - duonebs  - brovana/pulmicort     Depression/Anxiety/Insomnia  - continue home psych meds     Disposition  -will need placement to [facility]. Apparently last admission he was only approved or 3 days due to mobility being too good but now appears to having more difficulty. SW looking into [facility] which can do both alcohol and physical rehab. [facility] accepted and precert pending. Medically stable for discharge. NOTE: This report was transcribed using voice recognition software. Every effort was made to ensure accuracy; however, inadvertent computerized transcription errors may be present.      Electronically signed by Stacy Muñoz DO on 7/31/2023 at 5:08 PM

## 2023-08-01 PROCEDURE — 97530 THERAPEUTIC ACTIVITIES: CPT

## 2023-08-01 PROCEDURE — 97110 THERAPEUTIC EXERCISES: CPT

## 2023-08-01 PROCEDURE — 6370000000 HC RX 637 (ALT 250 FOR IP): Performed by: INTERNAL MEDICINE

## 2023-08-01 PROCEDURE — 6370000000 HC RX 637 (ALT 250 FOR IP): Performed by: FAMILY MEDICINE

## 2023-08-01 PROCEDURE — 2580000003 HC RX 258: Performed by: FAMILY MEDICINE

## 2023-08-01 PROCEDURE — 1200000000 HC SEMI PRIVATE

## 2023-08-01 PROCEDURE — 6360000002 HC RX W HCPCS: Performed by: FAMILY MEDICINE

## 2023-08-01 PROCEDURE — 2580000003 HC RX 258

## 2023-08-01 PROCEDURE — 94640 AIRWAY INHALATION TREATMENT: CPT

## 2023-08-01 PROCEDURE — 97535 SELF CARE MNGMENT TRAINING: CPT

## 2023-08-01 PROCEDURE — 6370000000 HC RX 637 (ALT 250 FOR IP)

## 2023-08-01 PROCEDURE — 99232 SBSQ HOSP IP/OBS MODERATE 35: CPT | Performed by: INTERNAL MEDICINE

## 2023-08-01 RX ADMIN — METOPROLOL SUCCINATE 25 MG: 25 TABLET, EXTENDED RELEASE ORAL at 10:11

## 2023-08-01 RX ADMIN — ATORVASTATIN CALCIUM 40 MG: 40 TABLET, FILM COATED ORAL at 20:25

## 2023-08-01 RX ADMIN — Medication 10 ML: at 20:26

## 2023-08-01 RX ADMIN — Medication 5 ML: at 10:19

## 2023-08-01 RX ADMIN — APIXABAN 5 MG: 5 TABLET, FILM COATED ORAL at 10:10

## 2023-08-01 RX ADMIN — LORAZEPAM 1 MG: 1 TABLET ORAL at 10:16

## 2023-08-01 RX ADMIN — BUDESONIDE 500 MCG: 0.5 SUSPENSION RESPIRATORY (INHALATION) at 05:43

## 2023-08-01 RX ADMIN — MULTIPLE VITAMINS W/ MINERALS TAB 1 TABLET: TAB at 10:11

## 2023-08-01 RX ADMIN — FOLIC ACID 1 MG: 1 TABLET ORAL at 10:11

## 2023-08-01 RX ADMIN — APIXABAN 5 MG: 5 TABLET, FILM COATED ORAL at 20:25

## 2023-08-01 RX ADMIN — PHENOBARBITAL 16.2 MG: 32.4 TABLET ORAL at 10:16

## 2023-08-01 RX ADMIN — THIAMINE HCL TAB 100 MG 100 MG: 100 TAB at 10:11

## 2023-08-01 RX ADMIN — QUETIAPINE FUMARATE 50 MG: 25 TABLET ORAL at 20:25

## 2023-08-01 RX ADMIN — ARFORMOTEROL TARTRATE 15 MCG: 15 SOLUTION RESPIRATORY (INHALATION) at 05:43

## 2023-08-01 RX ADMIN — METOPROLOL SUCCINATE 25 MG: 25 TABLET, EXTENDED RELEASE ORAL at 20:25

## 2023-08-01 RX ADMIN — PANTOPRAZOLE SODIUM 40 MG: 40 TABLET, DELAYED RELEASE ORAL at 05:37

## 2023-08-01 RX ADMIN — IPRATROPIUM BROMIDE AND ALBUTEROL SULFATE 1 DOSE: .5; 2.5 SOLUTION RESPIRATORY (INHALATION) at 12:50

## 2023-08-01 RX ADMIN — IPRATROPIUM BROMIDE AND ALBUTEROL SULFATE 1 DOSE: .5; 2.5 SOLUTION RESPIRATORY (INHALATION) at 09:18

## 2023-08-01 RX ADMIN — Medication 10 ML: at 10:19

## 2023-08-01 RX ADMIN — IPRATROPIUM BROMIDE AND ALBUTEROL SULFATE 1 DOSE: .5; 2.5 SOLUTION RESPIRATORY (INHALATION) at 05:43

## 2023-08-01 RX ADMIN — PHENOBARBITAL 16.2 MG: 32.4 TABLET ORAL at 20:24

## 2023-08-01 ASSESSMENT — PAIN - FUNCTIONAL ASSESSMENT
PAIN_FUNCTIONAL_ASSESSMENT: PREVENTS OR INTERFERES SOME ACTIVE ACTIVITIES AND ADLS
PAIN_FUNCTIONAL_ASSESSMENT: ACTIVITIES ARE NOT PREVENTED

## 2023-08-01 ASSESSMENT — PAIN DESCRIPTION - LOCATION
LOCATION: GENERALIZED
LOCATION: HEAD

## 2023-08-01 ASSESSMENT — PAIN DESCRIPTION - ONSET
ONSET: PROGRESSIVE
ONSET: PROGRESSIVE

## 2023-08-01 ASSESSMENT — PAIN DESCRIPTION - FREQUENCY
FREQUENCY: INTERMITTENT
FREQUENCY: CONTINUOUS

## 2023-08-01 ASSESSMENT — PAIN DESCRIPTION - PAIN TYPE
TYPE: ACUTE PAIN
TYPE: ACUTE PAIN

## 2023-08-01 ASSESSMENT — PAIN SCALES - GENERAL
PAINLEVEL_OUTOF10: 1
PAINLEVEL_OUTOF10: 2

## 2023-08-01 ASSESSMENT — PAIN DESCRIPTION - ORIENTATION: ORIENTATION: ANTERIOR;RIGHT;LEFT

## 2023-08-01 ASSESSMENT — PAIN DESCRIPTION - DESCRIPTORS
DESCRIPTORS: ACHING
DESCRIPTORS: OTHER (COMMENT)
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
## 2023-08-01 NOTE — PROGRESS NOTES
Nutrition Assessment     Type and Reason for Visit: Initial, RD Nutrition Re-Screen/LOS    Nutrition Recommendations/Plan:   Continue current diet & monitor     Nutrition Assessment:  Pt admits w/ SOB  (FTT- states he's not taking care of himself or bathing); Alcohol Problem (Drinking one liter of vodka daily) Dx: Alcohol withdrawal syndrome without complication, Lactic acidosis. Note [facility] protocol in place. Note recently d/c'd from hospital 7/10. PMHx CAD, COPD, CHF, depression, HLD, HTN, DM, alcoholism. Pt meal intake is >75%. Will monitor & provide nutrition intervention as appropriate. Nutrition Related Findings:   A/Ox 4; I/Os -1.6L; missing teeth;  soft/round/nontender abd+BS; flatus, trace/+1 edema, Wound Type: None    Current Nutrition Therapies:    ADULT DIET; Regular; 5 carb choices (75 gm/meal)    Anthropometric Measures:  Height: 5' 11\" (180.3 cm)  Current Body Wt: 237 lb 5 oz (107.6 kg) (7/24 bed)   BMI: 33.1    Nutrition Diagnosis:   No nutrition diagnosis at this time       Nutrition Interventions:   Food and/or Nutrient Delivery: Continue Current Diet  Nutrition Education/Counseling: No recommendation at this time  Coordination of Nutrition Care: Continue to monitor while inpatient     Goals:     Goals: PO intake 75% or greater     Nutrition Monitoring and Evaluation:   Behavioral-Environmental Outcomes: None Identified  Food/Nutrient Intake Outcomes: Food and Nutrient Intake  Physical Signs/Symptoms Outcomes: Biochemical Data, Chewing or Swallowing, GI Status, Fluid Status or Edema, Weight, Skin, Nutrition Focused Physical Findings    Discharge Planning:     Too soon to determine     Parul Chang RD  Contact: [phone]
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
## 2023-08-01 NOTE — PROGRESS NOTES
your back to sitting on the side of a flat bed without using bedrails?: A Little  How much help is needed moving to and from a bed to a chair?: A Little  How much help is needed standing up from a chair using your arms?: A Little  How much help is needed walking in hospital room?: A Little  How much help is needed climbing 3-5 steps with a railing?: A Little  AM-[facility] Inpatient Mobility Raw Score : 19  AM-PAC Inpatient T-Scale Score : 45.44  Mobility Inpatient CMS 0-100% Score: 41.77  Mobility Inpatient CMS G-Code Modifier : CK    Nursing cleared patient for PT treatment. OBJECTIVE:   Initial Evaluation  Date: 7/25/2023 Treatment Date:  7/26/23   Short Term/ Long Term   Goals   Was pt agreeable to Eval/treatment? Yes yes To be met in 3 days   Pain level   5/10  R wrist and headache None reported    Bed Mobility  Using rails and head of bed elevated:     Rolling: Supervision     Supine to sit: Minimal assist of 1    Sit to supine: Minimal assist of 1    Scooting: Minimal assist of 1   Using rails and head of bed elevated:     Rolling: Independent   Supine to sit: Supervision    Sit to supine: Not assessed patient in chair   Scooting: Independent    Rolling: Independent    Supine to sit: Independent    Sit to supine: Independent    Scooting: Independent     Transfers Sit to stand:  Moderate assist of 1  Sit to stand: Supervision     Sit to stand: Supervision     Ambulation     10 feet using  wheeled walker with Moderate assist of 1   for walker control, walker approximation, balance, upright, weight shift, multiplane instability, and safety 2x90', 40 feet using  no device with Minimal progressing to supervision   cues for safety and pacing  Seated rest break   > 100 feet using  wheeled walker with Supervision     ROM Within functional limits   Within functional limits  Increase range of motion 10% of affected joints    Strength BUE:  refer to OT eval  RLE:  4-/5  LLE:  4-/5 BUE:  refer to OT eval  RLE:  4-/5  LLE: minutes  1 unit(s)    Elsa Danger, PTA  #[id number]

## 2023-08-02 PROCEDURE — 2580000003 HC RX 258: Performed by: FAMILY MEDICINE

## 2023-08-02 PROCEDURE — 6370000000 HC RX 637 (ALT 250 FOR IP): Performed by: INTERNAL MEDICINE

## 2023-08-02 PROCEDURE — 2580000003 HC RX 258

## 2023-08-02 PROCEDURE — 97110 THERAPEUTIC EXERCISES: CPT | Performed by: PHYSICAL THERAPIST

## 2023-08-02 PROCEDURE — 1200000000 HC SEMI PRIVATE

## 2023-08-02 PROCEDURE — 6370000000 HC RX 637 (ALT 250 FOR IP)

## 2023-08-02 PROCEDURE — 99233 SBSQ HOSP IP/OBS HIGH 50: CPT | Performed by: INTERNAL MEDICINE

## 2023-08-02 PROCEDURE — 6370000000 HC RX 637 (ALT 250 FOR IP): Performed by: FAMILY MEDICINE

## 2023-08-02 PROCEDURE — 97530 THERAPEUTIC ACTIVITIES: CPT | Performed by: PHYSICAL THERAPIST

## 2023-08-02 PROCEDURE — 6360000002 HC RX W HCPCS: Performed by: INTERNAL MEDICINE

## 2023-08-02 PROCEDURE — 6360000002 HC RX W HCPCS: Performed by: FAMILY MEDICINE

## 2023-08-02 PROCEDURE — 94640 AIRWAY INHALATION TREATMENT: CPT

## 2023-08-02 RX ORDER — LORAZEPAM 1 MG/1
1 TABLET ORAL
Status: DISCONTINUED | OUTPATIENT
Start: 2023-08-02 | End: 2023-08-02

## 2023-08-02 RX ORDER — LORAZEPAM 2 MG/ML
1 INJECTION INTRAMUSCULAR ONCE
Status: COMPLETED | OUTPATIENT
Start: 2023-08-02 | End: 2023-08-02

## 2023-08-02 RX ORDER — M-VIT,TX,IRON,MINS/CALC/FOLIC 27MG-0.4MG
1 TABLET ORAL DAILY
DISCHARGE
Start: 2023-08-02 | End: 2023-08-03 | Stop reason: SDUPTHER

## 2023-08-02 RX ORDER — FOLIC ACID 1 MG/1
1 TABLET ORAL DAILY
Qty: 30 TABLET | Refills: 3 | DISCHARGE
Start: 2023-08-02 | End: 2023-08-03 | Stop reason: SDUPTHER

## 2023-08-02 RX ORDER — GABAPENTIN 100 MG/1
100 CAPSULE ORAL 3 TIMES DAILY
Status: DISCONTINUED | OUTPATIENT
Start: 2023-08-02 | End: 2023-08-03 | Stop reason: HOSPADM

## 2023-08-02 RX ORDER — LORAZEPAM 1 MG/1
1 TABLET ORAL
Status: DISCONTINUED | OUTPATIENT
Start: 2023-08-02 | End: 2023-08-03

## 2023-08-02 RX ADMIN — GABAPENTIN 100 MG: 100 CAPSULE ORAL at 13:09

## 2023-08-02 RX ADMIN — IPRATROPIUM BROMIDE AND ALBUTEROL SULFATE 1 DOSE: .5; 2.5 SOLUTION RESPIRATORY (INHALATION) at 09:37

## 2023-08-02 RX ADMIN — ATORVASTATIN CALCIUM 40 MG: 40 TABLET, FILM COATED ORAL at 20:04

## 2023-08-02 RX ADMIN — APIXABAN 5 MG: 5 TABLET, FILM COATED ORAL at 20:04

## 2023-08-02 RX ADMIN — APIXABAN 5 MG: 5 TABLET, FILM COATED ORAL at 09:07

## 2023-08-02 RX ADMIN — Medication 10 ML: at 09:08

## 2023-08-02 RX ADMIN — BUDESONIDE 500 MCG: 0.5 SUSPENSION RESPIRATORY (INHALATION) at 06:35

## 2023-08-02 RX ADMIN — QUETIAPINE FUMARATE 50 MG: 25 TABLET ORAL at 20:04

## 2023-08-02 RX ADMIN — IPRATROPIUM BROMIDE AND ALBUTEROL SULFATE 1 DOSE: .5; 2.5 SOLUTION RESPIRATORY (INHALATION) at 06:36

## 2023-08-02 RX ADMIN — THIAMINE HCL TAB 100 MG 100 MG: 100 TAB at 09:07

## 2023-08-02 RX ADMIN — LORAZEPAM 1 MG: 1 TABLET ORAL at 22:44

## 2023-08-02 RX ADMIN — MULTIPLE VITAMINS W/ MINERALS TAB 1 TABLET: TAB at 09:07

## 2023-08-02 RX ADMIN — GABAPENTIN 100 MG: 100 CAPSULE ORAL at 20:04

## 2023-08-02 RX ADMIN — ARFORMOTEROL TARTRATE 15 MCG: 15 SOLUTION RESPIRATORY (INHALATION) at 18:24

## 2023-08-02 RX ADMIN — LORAZEPAM 1 MG: 2 INJECTION INTRAMUSCULAR; INTRAVENOUS at 09:56

## 2023-08-02 RX ADMIN — PANTOPRAZOLE SODIUM 40 MG: 40 TABLET, DELAYED RELEASE ORAL at 05:40

## 2023-08-02 RX ADMIN — IPRATROPIUM BROMIDE AND ALBUTEROL SULFATE 1 DOSE: .5; 2.5 SOLUTION RESPIRATORY (INHALATION) at 15:16

## 2023-08-02 RX ADMIN — ARFORMOTEROL TARTRATE 15 MCG: 15 SOLUTION RESPIRATORY (INHALATION) at 06:35

## 2023-08-02 RX ADMIN — LORAZEPAM 1 MG: 1 TABLET ORAL at 20:36

## 2023-08-02 RX ADMIN — HYDROXYZINE PAMOATE 50 MG: 25 CAPSULE ORAL at 20:10

## 2023-08-02 RX ADMIN — METOPROLOL SUCCINATE 25 MG: 25 TABLET, EXTENDED RELEASE ORAL at 09:07

## 2023-08-02 RX ADMIN — FOLIC ACID 1 MG: 1 TABLET ORAL at 09:08

## 2023-08-02 RX ADMIN — IPRATROPIUM BROMIDE AND ALBUTEROL SULFATE 1 DOSE: .5; 2.5 SOLUTION RESPIRATORY (INHALATION) at 18:24

## 2023-08-02 RX ADMIN — BUDESONIDE 500 MCG: 0.5 SUSPENSION RESPIRATORY (INHALATION) at 18:24

## 2023-08-02 RX ADMIN — LORAZEPAM 1 MG: 1 TABLET ORAL at 18:25

## 2023-08-02 RX ADMIN — METOPROLOL SUCCINATE 25 MG: 25 TABLET, EXTENDED RELEASE ORAL at 20:04

## 2023-08-02 ASSESSMENT — PAIN SCALES - GENERAL: PAINLEVEL_OUTOF10: 0

## 2023-08-02 NOTE — DISCHARGE INSTRUCTIONS
Learning About Alcohol Use Disorder  What is alcohol use disorder? Alcohol use disorder means that a person drinks alcohol even though it causes harm to themselves or others. It can range from mild to severe. The more symptoms of this disorder you have, the more severe it may be. People who have it may find it hard to control their use of alcohol. People who have this disorder may argue with others about how much they're drinking. Their job may be affected because of drinking. They may drink when it's dangerous or illegal, such as when they drive. They also may have a strong need, or craving, to drink. They may feel like they must drink just to get by. Their drinking may increase their risk of getting hurt or being in a car crash. Over time, drinking too much alcohol may cause health problems. These may include high blood pressure, liver problems, or problems with digestion. What are the symptoms? Maybe you've wondered about your alcohol habits or how to tell if your drinking is becoming a problem. Here are some of the symptoms of alcohol use disorder. You may have it if you have two or more of the following symptoms: You drink larger amounts of alcohol than you ever meant to. Or you've been drinking for a longer time than you ever meant to. You can't cut down or control your use. Or you constantly wish you could cut down. You spend a lot of time getting or drinking alcohol or recovering from its effects. You have strong cravings for alcohol. You can no longer do your main jobs at work, at school, or at home. You keep drinking alcohol, even though your use hurts your relationships. You have stopped doing important activities because of your alcohol use. You drink alcohol in situations where doing so is dangerous. You keep drinking alcohol even though you know it's causing health problems.   You need more and more alcohol to get the same effect, or you get less effect from the same amount over

## 2023-08-02 NOTE — CARE COORDINATION
8/2/2023 1141 CM note:  [facility] pt for admission (for both physcial rehab and alcohol recovery) and obtained PRECERTJin ROMERO(completed by MIRIAN), NIKKY is signed. Per liaison Lester Boggs, facility cannot take pt today on ativan 1 mg po q2h prn. SNF requests update on patient status in am to discuss possible d/c in am.  Pt's niece Miald Penaloza is his contact for this admission [phone]. Milad Penaloza informed of above and relays she is not available to transport him to SNF tomorrow.  Tristan LEACH

## 2023-08-02 NOTE — PROGRESS NOTES
Physical Therapy  Physical Therapy Treatment Note/Plan of Care    Room #:  3465/4935-32  Patient Name: Jaden Zimmer  YOB: 1960  MRN: [id number]    Date of Service: 8/2/2023     Tentative placement recommendation: Subacute Rehab  Equipment recommendation: To be determined      Evaluating Physical Therapist: Biju Givens PT, DPT #[id number]      Specific Provider Orders/Date/Referring Provider :     07/25/23 1130    PT eval and treat  Start:  07/25/23 1130,   End:  07/25/23 1130,   ONE TIME,   Standing Count:  1 Occurrences,   Kimberly Gonzalez MD Acknowledge New     Admitting Diagnosis:   Alcohol withdrawal syndrome with complication, with unspecified complication [facility]) [S32.818]      Surgery: none  Visit Diagnoses         Codes    Alcohol withdrawal syndrome with complication ([address])    -  Primary F10.939    Lactic acidosis     E87.20            Patient Active Problem List   Diagnosis    Right adrenal mass ([address])    Lesion of right native kidney    Essential hypertension    Type 2 diabetes mellitus with hyperglycemia, without long-term current use of insulin (HCC)    Chronic obstructive pulmonary disease (HCC)    Hyperlipidemia    Macrocytosis    Coronary artery disease involving native coronary artery of native heart without angina pectoris    Depression with anxiety    Psychophysiological insomnia    Tobacco dependency    Atrial fibrillation with controlled ventricular rate (HCC)    Alcohol withdrawal syndrome without complication (HCC)    JOHN (obstructive sleep apnea)    Obesity (BMI 30.0-34. 9)    Acute on chronic heart failure with preserved ejection fraction (HCC)    Alcoholic liver disease (HCC)    Thrombocytopenia (HCC)    Alcohol withdrawal syndrome with complication (HCC)    Exertional dyspnea    Atrial fibrillation with RVR (HCC)        ASSESSMENT of Current Deficits Patient exhibits decreased strength, balance, and endurance impairing functional mobility, transfers, gait , gait

## 2023-08-02 NOTE — CARE COORDINATION
8/2/2023 0911 BIN note: [facility] accepted pt for admission (for both physcial rehab and alcohol recovery) and obtained PRECERT. HEATHER(completed by MIRIAN), NIKKY is signed. Pt's niece Shaun Beltran is his contact for this [phone]. Shaun Beltran informed of discharge today and she will transport pt to SNF around 4:30pm. SNF liaison [phone] and julia informed of arrangements. CM left vm message for Nikhil St CM ([phone]) regarding discharge plan and Natty;s request for [facility] to cem her. N-N [phone].  Tristan LEACH

## 2023-08-02 NOTE — PROGRESS NOTES
[facility]   [facility]    Admitting Date and Time: 7/24/2023  3:21 PM  Admit Dx: Alcohol withdrawal syndrome with complication, with unspecified complication ([address]) [P26.707]    Subjective/interval history:    7/25: Pt feels shaky. Nurses at bedside  Per RN, patient being medicated with Phenobarb as scheduled. 7/26: Pt resting in bed. He was asking for Ativan. Per RN, he did work with PT; with RN he was unsteady with nursing. 7/27: Pt sitting up eating dinner. He is asking for Protonix to be ordered. Per RN,  medicated as per STACI    7/28: Pt voices no complaints. Tolerating meds. 7/29: Pt feeling very shaky and anxious today. Per RN,  STACI elevated this AM at 19. He required 7 mg Ativan to get relief. 7/30: Patient lying in bed. Overall, feels better today. Per RN: Doing better today with resumption of phenobarbital yesterday afternoon. 7/31: Patient is awake, alert, denies any complaints. Awaiting subacute rehab placement. 8/1: Patient sitting up in bed, eating lunch. Denies any complaints. Awaiting pre-CERT.    8/2: Patient was feeling well up until yesterday evening when he started to have tremors again. This morning he is very tremulous, feels that withdrawal symptoms are recurring.      LORazepam  1 mg IntraVENous Once    pantoprazole  40 mg Oral QAM AC    sodium chloride flush  5-40 mL IntraVENous 2 times per day    thiamine  100 mg Oral Daily    folic acid  1 mg Oral Daily    sodium chloride flush  5-40 mL IntraVENous 2 times per day    multivitamin  1 tablet Oral Daily    apixaban  5 mg Oral BID    atorvastatin  40 mg Oral Nightly    metoprolol succinate  25 mg Oral BID    nicotine  1 patch TransDERmal Q24H    QUEtiapine  50 mg Oral Nightly    arformoterol tartrate  15 mcg Nebulization BID RT    budesonide  0.5 mg Nebulization BID RT    ipratropium 0.5 mg-albuterol 2.5 mg  1 Dose Inhalation Q4H WA RT     LORazepam, 1 mg, Q2H PRN  sodium chloride [[id number]] (Abnormal) Collected: 07/24/23 2208     Specimen: Urine Updated: 07/24/23 2318      Amphetamine Screen, Ur NEGATIVE      Barbiturate Screen, Ur POSITIVE      Benzodiazepine Screen, Urine POSITIVE      Cocaine Metabolite, Urine NEGATIVE      Methadone Screen, Urine NEGATIVE      Opiates, Urine NEGATIVE      Phencyclidine, Urine NEGATIVE      Cannabinoid Scrn, Ur NEGATIVE      Oxycodone Screen, Ur NEGATIVE      Fentanyl, Ur POSITIVE      Buprenorphine Urine NEGATIVE      Test Information These drug screen results are for medical purposes only and should not be considered definitive or confirmed. Radiology:   XR CHEST PORTABLE   Final Result   No acute process. Assessment and Plan:  Principal Problem:    Alcohol withdrawal syndrome with complication (HCC)  Active Problems:    Depression with anxiety    Psychophysiological insomnia    Tobacco dependency    Essential hypertension    Chronic obstructive pulmonary disease (HCC)    Hyperlipidemia    Atrial fibrillation with controlled ventricular rate (HCC)    JOHN (obstructive sleep apnea)    Obesity (BMI 30.0-34. 9)  Resolved Problems:    * No resolved hospital problems. *      Alcohol withdrawal/alcoholism with folic acid deficiency  -Withdrawal symptoms have recurred despite 9 days of treatment. We will restart Ativan; 1 mg IV x1, followed by 1 mg p.o. every 2 hours as needed. We will also start gabapentin 100 mg p.o. 3 times daily which can be continued on discharge  -continue thiamine, MVI.   - B12 wnl at 439 but folate low at 3.7. Patient was started on Folic acid 1 mg daily.     Inability to ambulate  - PT/OT eval and recommending [facility], possible transfer in the next 24 to 48 hours     Atrial fibrillation on anticoagulation  - currently in SR  - admitted 7/2 for AFib RVR and had JORGE cardioversion 7/7  - continue Eliquis  - monitor telemetry  - metoprolol for rate control     Hyperglycemia but now qualifies as type II DM with A1c >/= to

## 2023-08-03 VITALS
SYSTOLIC BLOOD PRESSURE: 154 MMHG | DIASTOLIC BLOOD PRESSURE: 74 MMHG | HEART RATE: 83 BPM | HEIGHT: 71 IN | OXYGEN SATURATION: 94 % | WEIGHT: 237.3 LBS | RESPIRATION RATE: 25 BRPM | TEMPERATURE: 97.8 F | BODY MASS INDEX: 33.22 KG/M2

## 2023-08-03 PROCEDURE — 6360000002 HC RX W HCPCS: Performed by: FAMILY MEDICINE

## 2023-08-03 PROCEDURE — 97530 THERAPEUTIC ACTIVITIES: CPT

## 2023-08-03 PROCEDURE — 2580000003 HC RX 258

## 2023-08-03 PROCEDURE — 6370000000 HC RX 637 (ALT 250 FOR IP): Performed by: FAMILY MEDICINE

## 2023-08-03 PROCEDURE — 6370000000 HC RX 637 (ALT 250 FOR IP)

## 2023-08-03 PROCEDURE — 99239 HOSP IP/OBS DSCHRG MGMT >30: CPT | Performed by: INTERNAL MEDICINE

## 2023-08-03 PROCEDURE — 6370000000 HC RX 637 (ALT 250 FOR IP): Performed by: INTERNAL MEDICINE

## 2023-08-03 PROCEDURE — 94640 AIRWAY INHALATION TREATMENT: CPT

## 2023-08-03 PROCEDURE — 2580000003 HC RX 258: Performed by: FAMILY MEDICINE

## 2023-08-03 PROCEDURE — 97110 THERAPEUTIC EXERCISES: CPT

## 2023-08-03 RX ORDER — THIAMINE MONONITRATE (VIT B1) 100 MG
100 TABLET ORAL DAILY
Qty: 30 TABLET | Refills: 0 | Status: SHIPPED | OUTPATIENT
Start: 2023-08-03 | End: 2024-08-03

## 2023-08-03 RX ORDER — LORAZEPAM 1 MG/1
1 TABLET ORAL EVERY 4 HOURS PRN
Qty: 18 TABLET | Refills: 0 | Status: SHIPPED | OUTPATIENT
Start: 2023-08-03 | End: 2023-08-06

## 2023-08-03 RX ORDER — GABAPENTIN 100 MG/1
100 CAPSULE ORAL 3 TIMES DAILY
Qty: 90 CAPSULE | Refills: 0 | DISCHARGE
Start: 2023-08-03 | End: 2023-09-02

## 2023-08-03 RX ORDER — LORAZEPAM 1 MG/1
1 TABLET ORAL EVERY 4 HOURS PRN
Status: DISCONTINUED | OUTPATIENT
Start: 2023-08-03 | End: 2023-08-03 | Stop reason: HOSPADM

## 2023-08-03 RX ORDER — ATORVASTATIN CALCIUM 40 MG/1
40 TABLET, FILM COATED ORAL NIGHTLY
Qty: 30 TABLET | Refills: 0 | Status: SHIPPED | OUTPATIENT
Start: 2023-08-03 | End: 2024-08-03

## 2023-08-03 RX ORDER — NICOTINE 21 MG/24HR
1 PATCH, TRANSDERMAL 24 HOURS TRANSDERMAL EVERY 24 HOURS
Qty: 14 PATCH | Refills: 0 | Status: SHIPPED | OUTPATIENT
Start: 2023-08-03 | End: 2023-08-17

## 2023-08-03 RX ORDER — TRAZODONE HYDROCHLORIDE 100 MG/1
100 TABLET ORAL NIGHTLY PRN
Qty: 30 TABLET | Refills: 0 | Status: SHIPPED | OUTPATIENT
Start: 2023-08-03 | End: 2024-08-03

## 2023-08-03 RX ORDER — QUETIAPINE FUMARATE 25 MG/1
50 TABLET, FILM COATED ORAL NIGHTLY
Qty: 60 TABLET | Refills: 0 | Status: SHIPPED | OUTPATIENT
Start: 2023-08-03 | End: 2023-09-02

## 2023-08-03 RX ORDER — LORAZEPAM 1 MG/1
1 TABLET ORAL EVERY 4 HOURS PRN
Qty: 18 TABLET | Status: SHIPPED | DISCHARGE
Start: 2023-08-03 | End: 2023-08-03 | Stop reason: SDUPTHER

## 2023-08-03 RX ORDER — M-VIT,TX,IRON,MINS/CALC/FOLIC 27MG-0.4MG
1 TABLET ORAL DAILY
Qty: 30 TABLET | Refills: 0 | Status: SHIPPED | OUTPATIENT
Start: 2023-08-03

## 2023-08-03 RX ORDER — FLUTICASONE PROPIONATE 50 MCG
2 SPRAY, SUSPENSION (ML) NASAL DAILY
Qty: 1 EACH | Refills: 0 | Status: SHIPPED | OUTPATIENT
Start: 2023-08-03 | End: 2024-08-03

## 2023-08-03 RX ORDER — METOPROLOL SUCCINATE 25 MG/1
25 TABLET, EXTENDED RELEASE ORAL 2 TIMES DAILY
Qty: 30 TABLET | Refills: 0 | Status: SHIPPED | OUTPATIENT
Start: 2023-08-03 | End: 2024-08-03

## 2023-08-03 RX ORDER — ALBUTEROL SULFATE 90 UG/1
2 AEROSOL, METERED RESPIRATORY (INHALATION) EVERY 6 HOURS PRN
Qty: 1 EACH | Refills: 11 | Status: SHIPPED | OUTPATIENT
Start: 2023-08-03 | End: 2024-08-03

## 2023-08-03 RX ORDER — LORAZEPAM 1 MG/1
1 TABLET ORAL 2 TIMES DAILY PRN
Qty: 2 TABLET | Refills: 0 | Status: SHIPPED | OUTPATIENT
Start: 2023-08-03 | End: 2023-08-04

## 2023-08-03 RX ORDER — OMEPRAZOLE 40 MG/1
40 CAPSULE, DELAYED RELEASE ORAL DAILY PRN
Qty: 30 CAPSULE | Refills: 0 | Status: SHIPPED | OUTPATIENT
Start: 2023-08-03 | End: 2024-08-03

## 2023-08-03 RX ORDER — FOLIC ACID 1 MG/1
1 TABLET ORAL DAILY
Qty: 30 TABLET | Refills: 0 | Status: SHIPPED | OUTPATIENT
Start: 2023-08-03

## 2023-08-03 RX ORDER — HYDROXYZINE PAMOATE 50 MG/1
50 CAPSULE ORAL NIGHTLY PRN
Qty: 30 CAPSULE | Refills: 0 | Status: SHIPPED | OUTPATIENT
Start: 2023-08-03 | End: 2024-08-03

## 2023-08-03 RX ADMIN — GABAPENTIN 100 MG: 100 CAPSULE ORAL at 13:54

## 2023-08-03 RX ADMIN — LORAZEPAM 1 MG: 1 TABLET ORAL at 16:34

## 2023-08-03 RX ADMIN — APIXABAN 5 MG: 5 TABLET, FILM COATED ORAL at 09:03

## 2023-08-03 RX ADMIN — IPRATROPIUM BROMIDE AND ALBUTEROL SULFATE 1 DOSE: .5; 2.5 SOLUTION RESPIRATORY (INHALATION) at 09:06

## 2023-08-03 RX ADMIN — Medication 10 ML: at 09:05

## 2023-08-03 RX ADMIN — PANTOPRAZOLE SODIUM 40 MG: 40 TABLET, DELAYED RELEASE ORAL at 06:44

## 2023-08-03 RX ADMIN — LORAZEPAM 1 MG: 1 TABLET ORAL at 12:28

## 2023-08-03 RX ADMIN — Medication 10 ML: at 09:11

## 2023-08-03 RX ADMIN — THIAMINE HCL TAB 100 MG 100 MG: 100 TAB at 09:03

## 2023-08-03 RX ADMIN — FOLIC ACID 1 MG: 1 TABLET ORAL at 09:03

## 2023-08-03 RX ADMIN — LORAZEPAM 1 MG: 1 TABLET ORAL at 07:00

## 2023-08-03 RX ADMIN — METOPROLOL SUCCINATE 25 MG: 25 TABLET, EXTENDED RELEASE ORAL at 09:02

## 2023-08-03 RX ADMIN — ARFORMOTEROL TARTRATE 15 MCG: 15 SOLUTION RESPIRATORY (INHALATION) at 05:00

## 2023-08-03 RX ADMIN — MULTIPLE VITAMINS W/ MINERALS TAB 1 TABLET: TAB at 09:03

## 2023-08-03 RX ADMIN — Medication 10 ML: at 00:12

## 2023-08-03 RX ADMIN — LORAZEPAM 1 MG: 1 TABLET ORAL at 00:46

## 2023-08-03 RX ADMIN — GABAPENTIN 100 MG: 100 CAPSULE ORAL at 09:03

## 2023-08-03 RX ADMIN — IPRATROPIUM BROMIDE AND ALBUTEROL SULFATE 1 DOSE: .5; 2.5 SOLUTION RESPIRATORY (INHALATION) at 05:00

## 2023-08-03 RX ADMIN — TRAZODONE HYDROCHLORIDE 100 MG: 50 TABLET ORAL at 00:46

## 2023-08-03 RX ADMIN — BUDESONIDE 500 MCG: 0.5 SUSPENSION RESPIRATORY (INHALATION) at 05:00

## 2023-08-03 ASSESSMENT — PAIN DESCRIPTION - LOCATION: LOCATION: GENERALIZED

## 2023-08-03 ASSESSMENT — PAIN - FUNCTIONAL ASSESSMENT: PAIN_FUNCTIONAL_ASSESSMENT: ACTIVITIES ARE NOT PREVENTED

## 2023-08-03 ASSESSMENT — PAIN DESCRIPTION - FREQUENCY: FREQUENCY: INTERMITTENT

## 2023-08-03 ASSESSMENT — PAIN DESCRIPTION - ORIENTATION: ORIENTATION: LOWER;INNER

## 2023-08-03 ASSESSMENT — PAIN SCALES - GENERAL: PAINLEVEL_OUTOF10: 3

## 2023-08-03 NOTE — CARE COORDINATION
8/3/2023 1312 BIN note:  [facility] accepted pt for admission (for both physcial rehab and alcohol recovery) and obtained PRECERT. HENS(completed by MIRIAN), NIKKY is signed. Per reece Simmons, facility can take pt with ativan 1mg q4h prn rx. Arrangements made for pt to be transported to SNF at  3pm via [facility] wheelchair transport. (David Diaz from [employer] [phone], stated transportation could be arranged with [facility]). Pt, teamlead, SNF reece Simmons informed of arrangements. CM left  message for pt's niece Keturah Proud regarding arrangements. N-N [phone] OSMANY Cook RN
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
## 2023-08-03 NOTE — PROGRESS NOTES
Called [address] [place] [address] for nurse to nurse report, nobody answer's phone, voicemail full.

## 2023-08-03 NOTE — PROGRESS NOTES
CLINICAL PHARMACY NOTE: MEDS TO BEDS    Total # of Prescriptions Filled: 1   The following medications were delivered to the patient:  Lorazepam 1 mg    Additional Documentation:

## 2023-08-03 NOTE — PROGRESS NOTES
Physical Therapy  Physical Therapy Treatment Note/Plan of Care    Room #:  8949/3498-32  Patient Name: Sherly Barker  YOB: 1960  MRN: [id number]    Date of Service: 8/3/2023     Tentative placement recommendation: Subacute Rehab  Equipment recommendation: To be determined      Evaluating Physical Therapist: Guy Richards PT, DPT #[id number]      Specific Provider Orders/Date/Referring Provider :     07/25/23 1130    PT eval and treat  Start:  07/25/23 1130,   End:  07/25/23 1130,   ONE TIME,   Standing Count:  1 Occurrences,   Mj Mcmahan MD Acknowledge New     Admitting Diagnosis:   Alcohol withdrawal syndrome with complication, with unspecified complication [facility]) [W02.871]      Surgery: none  Visit Diagnoses         Codes    Alcohol withdrawal syndrome with complication ([address])    -  Primary F10.939    Lactic acidosis     E87.20    Hospital discharge follow-up     Z09    Encounter for medication refill     Z76.0    CAD in native artery     I25.10    Chronic sinusitis, unspecified location     J32.9            Patient Active Problem List   Diagnosis    Right adrenal mass ([address])    Lesion of right native kidney    Essential hypertension    Type 2 diabetes mellitus with hyperglycemia, without long-term current use of insulin (HCC)    Chronic obstructive pulmonary disease (HCC)    Hyperlipidemia    Macrocytosis    Coronary artery disease involving native coronary artery of native heart without angina pectoris    Depression with anxiety    Psychophysiological insomnia    Tobacco dependency    Atrial fibrillation with controlled ventricular rate (HCC)    Alcohol withdrawal syndrome without complication (HCC)    JOHN (obstructive sleep apnea)    Obesity (BMI 30.0-34. 9)    Acute on chronic heart failure with preserved ejection fraction (HCC)    Alcoholic liver disease (HCC)    Thrombocytopenia (HCC)    Alcohol withdrawal syndrome with complication (HCC)    Exertional dyspnea    Atrial flat bed without using bedrails?: None  How much help is needed moving from lying on your back to sitting on the side of a flat bed without using bedrails?: None  How much help is needed moving to and from a bed to a chair?: None  How much help is needed standing up from a chair using your arms?: None  How much help is needed walking in hospital room?: A Little  How much help is needed climbing 3-5 steps with a railing?: A Little  AM-PAC Inpatient Mobility Raw Score : 22  AM-PAC Inpatient T-Scale Score : 53.28  Mobility Inpatient CMS 0-100% Score: 20.91  Mobility Inpatient CMS G-Code Modifier : [facility] cleared patient for PT treatment. OBJECTIVE:   Initial Evaluation  Date: 7/25/2023 Treatment Date:  7/26/23   Short Term/ Long Term   Goals   Was pt agreeable to Eval/treatment? Yes yes To be met in 3 days   Pain level   5/10  R wrist and headache None reported    Bed Mobility  Using rails and head of bed elevated:     Rolling: Supervision     Supine to sit: Minimal assist of 1    Sit to supine: Minimal assist of 1    Scooting: Minimal assist of 1   Using rails and head of bed elevated:     Rolling: Not assessed patient in chair    Rolling: Independent    Supine to sit: Independent    Sit to supine: Independent    Scooting: Independent     Transfers Sit to stand:  Moderate assist of 1  Sit to stand: Independent    Sit to stand: Supervision     Ambulation     10 feet using  wheeled walker with Moderate assist of 1   for walker control, walker approximation, balance, upright, weight shift, multiplane instability, and safety 90', 2x125 feet using  no device with Supervision    cues for safety and pacing and pursed lip breathing    Seated rest break needed   > 100 feet using  wheeled walker with Supervision     ROM Within functional limits   Within functional limits  Increase range of motion 10% of affected joints    Strength BUE:  refer to OT eval  RLE:  4-/5  LLE:  4-/5 BUE:  refer to OT eval  RLE:  4-/5  LLE:

## 2023-08-03 NOTE — DISCHARGE SUMMARY
[facility] Physician Discharge Summary       Carire Burgos, [address] [facility] [id number] [phone]    Follow up        Activity level: Up with assistance    Diet: ADULT DIET; Regular; 5 carb choices (75 gm/meal)    Labs: Per primary care physician    Condition at discharge: Stable    Dispo: [facility]    Patient ID:  Bianca Howell  [id number]  61 y.o.  1960    Admit date: 7/24/2023    Discharge date and time:  8/3/2023  12:34 PM    Admission Diagnoses: Principal Problem:    Alcohol withdrawal syndrome with complication ([address])  Active Problems:    Depression with anxiety    Psychophysiological insomnia    Tobacco dependency    Essential hypertension    Chronic obstructive pulmonary disease ([address])    Hyperlipidemia    Atrial fibrillation with controlled ventricular rate (HCC)    JOHN (obstructive sleep apnea)    Obesity (BMI 30.0-34. 9)  Resolved Problems:    * No resolved hospital problems. *      Discharge Diagnoses: Principal Problem:    Alcohol withdrawal syndrome with complication (HCC)  Active Problems:    Depression with anxiety    Psychophysiological insomnia    Tobacco dependency    Essential hypertension    Chronic obstructive pulmonary disease (HCC)    Hyperlipidemia    Atrial fibrillation with controlled ventricular rate (HCC)    JOHN (obstructive sleep apnea)    Obesity (BMI 30.0-34. 9)  Resolved Problems:    * No resolved hospital problems. *      Consults:  IP CONSULT TO SOCIAL WORK  IP CONSULT TO SOCIAL WORK  IP CONSULT TO SOCIAL WORK    Procedures: None    Hospital Course: This is a 40-year-old male with a history significant for alcohol abuse, hypertension, COPD, coronary artery disease, congestive heart failure that was admitted to the hospital with alcohol withdrawal syndrome.   Couple weeks prior to this admission, he was hospitalized for alcohol withdrawal syndrome and atrial fibrillation with rapid ventricular

## 2023-08-03 NOTE — PLAN OF CARE
Problem: Discharge Planning  Goal: Discharge to home or other facility with appropriate resources  Outcome: Progressing     Problem: Pain  Goal: Verbalizes/displays adequate comfort level or baseline comfort level  Outcome: Progressing     Problem: Safety - Adult  Goal: Free from fall injury  Outcome: Progressing     Problem: Drug Abuse/Detox  Goal: Will have no detox symptoms and will verbalize plan for changing drug-related behavior  Description: INTERVENTIONS:  1. Administer medication as ordered  2. Monitor physical status  3. Provide emotional support with 1:1 interaction with staff  4.  Encourage  recovery focused treatment   Outcome: Progressing

## 2023-12-10 ENCOUNTER — APPOINTMENT (OUTPATIENT)
Dept: GENERAL RADIOLOGY | Age: 63
End: 2023-12-10
Payer: COMMERCIAL

## 2023-12-10 ENCOUNTER — APPOINTMENT (OUTPATIENT)
Dept: CT IMAGING | Age: 63
End: 2023-12-10
Payer: COMMERCIAL

## 2023-12-10 ENCOUNTER — HOSPITAL ENCOUNTER (EMERGENCY)
Age: 63
Discharge: HOME | End: 2023-12-11
Attending: EMERGENCY MEDICINE
Payer: COMMERCIAL
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
## 2023-12-10 DIAGNOSIS — F10.920 ACUTE ALCOHOLIC INTOXICATION WITHOUT COMPLICATION (HCC): Primary | ICD-10-CM

## 2023-12-10 DIAGNOSIS — S01.81XA FACIAL LACERATION, INITIAL ENCOUNTER: ICD-10-CM

## 2023-12-10 LAB
ALBUMIN SERPL-MCNC: 4 G/DL (ref 3.5–5.2)
ALP SERPL-CCNC: 126 U/L (ref 40–129)
ALT SERPL-CCNC: 14 U/L (ref 0–40)
ANION GAP SERPL CALCULATED.3IONS-SCNC: 14 MMOL/L (ref 7–16)
APAP SERPL-MCNC: <5 UG/ML (ref 10–30)
AST SERPL-CCNC: 15 U/L (ref 0–39)
BASOPHILS # BLD: 0.07 K/UL (ref 0–0.2)
BASOPHILS NFR BLD: 0 % (ref 0–2)
BILIRUB SERPL-MCNC: 0.3 MG/DL (ref 0–1.2)
BUN SERPL-MCNC: 11 MG/DL (ref 6–23)
CALCIUM SERPL-MCNC: 8.9 MG/DL (ref 8.6–10.2)
CHLORIDE SERPL-SCNC: 104 MMOL/L (ref 98–107)
CO2 SERPL-SCNC: 22 MMOL/L (ref 22–29)
CREAT SERPL-MCNC: 1 MG/DL (ref 0.7–1.2)
EOSINOPHIL # BLD: 0.16 K/UL (ref 0.05–0.5)
EOSINOPHILS RELATIVE PERCENT: 1 % (ref 0–6)
ERYTHROCYTE [DISTWIDTH] IN BLOOD BY AUTOMATED COUNT: 12.6 % (ref 11.5–15)
ETHANOLAMINE SERPL-MCNC: 220 MG/DL
GFR SERPL CREATININE-BSD FRML MDRD: >60 ML/MIN/1.73M2
GLUCOSE SERPL-MCNC: 153 MG/DL (ref 74–99)
HCT VFR BLD AUTO: 45.7 % (ref 37–54)
HGB BLD-MCNC: 15.2 G/DL (ref 12.5–16.5)
IMM GRANULOCYTES # BLD AUTO: 0.14 K/UL (ref 0–0.58)
IMM GRANULOCYTES NFR BLD: 1 % (ref 0–5)
LYMPHOCYTES NFR BLD: 4.91 K/UL (ref 1.5–4)
LYMPHOCYTES RELATIVE PERCENT: 27 % (ref 20–42)
MCH RBC QN AUTO: 31.7 PG (ref 26–35)
MCHC RBC AUTO-ENTMCNC: 33.3 G/DL (ref 32–34.5)
MCV RBC AUTO: 95.4 FL (ref 80–99.9)
MONOCYTES NFR BLD: 0.71 K/UL (ref 0.1–0.95)
MONOCYTES NFR BLD: 4 % (ref 2–12)
NEUTROPHILS NFR BLD: 67 % (ref 43–80)
NEUTS SEG NFR BLD: 11.99 K/UL (ref 1.8–7.3)
PLATELET # BLD AUTO: 182 K/UL (ref 130–450)
PMV BLD AUTO: 10.7 FL (ref 7–12)
POTASSIUM SERPL-SCNC: 4.1 MMOL/L (ref 3.5–5)
PROT SERPL-MCNC: 6.8 G/DL (ref 6.4–8.3)
RBC # BLD AUTO: 4.79 M/UL (ref 3.8–5.8)
SALICYLATES SERPL-MCNC: <0.3 MG/DL (ref 0–30)
SODIUM SERPL-SCNC: 140 MMOL/L (ref 132–146)
TOXIC TRICYCLIC SC,BLOOD: NEGATIVE
TROPONIN I SERPL HS-MCNC: 20 NG/L (ref 0–11)
WBC OTHER # BLD: 18 K/UL (ref 4.5–11.5)

## 2023-12-10 PROCEDURE — 80179 DRUG ASSAY SALICYLATE: CPT

## 2023-12-10 PROCEDURE — 85025 COMPLETE CBC W/AUTO DIFF WBC: CPT

## 2023-12-10 PROCEDURE — 99285 EMERGENCY DEPT VISIT HI MDM: CPT

## 2023-12-10 PROCEDURE — G0480 DRUG TEST DEF 1-7 CLASSES: HCPCS

## 2023-12-10 PROCEDURE — 84484 ASSAY OF TROPONIN QUANT: CPT

## 2023-12-10 PROCEDURE — 80143 DRUG ASSAY ACETAMINOPHEN: CPT

## 2023-12-10 PROCEDURE — 70450 CT HEAD/BRAIN W/O DYE: CPT

## 2023-12-10 PROCEDURE — 72125 CT NECK SPINE W/O DYE: CPT

## 2023-12-10 PROCEDURE — 80307 DRUG TEST PRSMV CHEM ANLYZR: CPT

## 2023-12-10 PROCEDURE — 72131 CT LUMBAR SPINE W/O DYE: CPT

## 2023-12-10 PROCEDURE — 80053 COMPREHEN METABOLIC PANEL: CPT

## 2023-12-10 PROCEDURE — 71045 X-RAY EXAM CHEST 1 VIEW: CPT

## 2023-12-10 ASSESSMENT — LIFESTYLE VARIABLES
HOW OFTEN DO YOU HAVE A DRINK CONTAINING ALCOHOL: PATIENT DECLINED
HOW MANY STANDARD DRINKS CONTAINING ALCOHOL DO YOU HAVE ON A TYPICAL DAY: PATIENT DECLINED

## 2023-12-11 VITALS
RESPIRATION RATE: 18 BRPM | OXYGEN SATURATION: 99 % | SYSTOLIC BLOOD PRESSURE: 134 MMHG | DIASTOLIC BLOOD PRESSURE: 72 MMHG | TEMPERATURE: 98 F | HEART RATE: 82 BPM

## 2023-12-11 LAB
BILIRUB UR QL STRIP: NEGATIVE
CLARITY UR: CLEAR
COLOR UR: YELLOW
COMMENT: NORMAL
EKG ATRIAL RATE: 83 BPM
EKG P AXIS: 78 DEGREES
EKG P-R INTERVAL: 184 MS
EKG Q-T INTERVAL: 390 MS
EKG QRS DURATION: 88 MS
EKG QTC CALCULATION (BAZETT): 458 MS
EKG R AXIS: -33 DEGREES
EKG T AXIS: 126 DEGREES
EKG VENTRICULAR RATE: 83 BPM
GLUCOSE UR STRIP-MCNC: NEGATIVE MG/DL
HGB UR QL STRIP.AUTO: NEGATIVE
KETONES UR STRIP-MCNC: NEGATIVE MG/DL
LEUKOCYTE ESTERASE UR QL STRIP: NEGATIVE
NITRITE UR QL STRIP: NEGATIVE
PH UR STRIP: 6 [PH] (ref 5–9)
PROT UR STRIP-MCNC: NEGATIVE MG/DL
SP GR UR STRIP: 1.01 (ref 1–1.03)
TROPONIN I SERPL HS-MCNC: 8 NG/L (ref 0–11)
UROBILINOGEN UR STRIP-ACNC: 0.2 EU/DL (ref 0–1)

## 2023-12-11 PROCEDURE — 81003 URINALYSIS AUTO W/O SCOPE: CPT

## 2023-12-11 PROCEDURE — 93005 ELECTROCARDIOGRAM TRACING: CPT | Performed by: EMERGENCY MEDICINE

## 2023-12-11 PROCEDURE — 84484 ASSAY OF TROPONIN QUANT: CPT

## 2023-12-11 PROCEDURE — 93010 ELECTROCARDIOGRAM REPORT: CPT | Performed by: INTERNAL MEDICINE

## 2023-12-11 PROCEDURE — 6370000000 HC RX 637 (ALT 250 FOR IP): Performed by: EMERGENCY MEDICINE

## 2023-12-11 RX ORDER — BACITRACIN ZINC 500 [USP'U]/G
OINTMENT TOPICAL ONCE
Status: COMPLETED | OUTPATIENT
Start: 2023-12-11 | End: 2023-12-11

## 2023-12-11 RX ADMIN — BACITRACIN ZINC: 500 OINTMENT TOPICAL at 02:57

## 2023-12-11 NOTE — ED PROVIDER NOTES
awake alert oriented x 3 heart exam normal lungs are clear abdomen soft nontender. Patient able to move all extremities he has some mild tenderness to his lower lumbar spine. Patient has superficial laceration to his right medial cheek. Patient has no thoracic tenderness. Patient has no cervical spine tenderness on exam he has no jaw tenderness. Patient differential includes subarachnoid as well as subdural hematoma as well as cervical spine fracture as well as lumbar spine fracture as well as electro imbalance and alcohol intoxication. Patient while here in emergency room had lab work obtained white count was 18,000 I suspect stress-induced due to fall. Patient afebrile here and patient's hemoglobin is 15 platelet count was 261 sodium is 140 potassium is 4.1 BUN is 11 creatinine is 1 patient's alk phos is 126 troponin was 20 repeat was 8 urine was nitrite and leukocyte esterase negative alcohol level 220 chest x-ray showed dense opacity cannot exclude pleural effusion on left I looked at the old chest x-ray looks unchanged per my standpoint CT head and neck showed no intracranial findings such as subdural subarachnoid hemorrhage CT cervical spine showed no fracture or dislocation CT lumbar spine showed a 50% lumbar compression fracture uncertain chronicity. Patient was able to stand and walk. Patient aware of results and plan will be to discharge back to facility he will follow-up with his PCP as well as was given referral to neurosurgery. Patient reports his tetanus is up-to-date.   Patient had bacitracin applied to the superficial laceration to his right medial cheek  Social Determinants affecting Dx or Tx: Patient does have a history of tobacco use    Chronic Conditions: Patient has history of heart failure history of coronary disease history of COPD diabetes hyperlipidemia hypertension    Records Reviewed:   Patient was last seen on 7/24/2023 for alcohol withdrawal      Re-Evaluations:  Patient was

## 2024-07-03 ENCOUNTER — APPOINTMENT (OUTPATIENT)
Dept: CT IMAGING | Age: 64
End: 2024-07-03
Payer: COMMERCIAL

## 2024-07-03 ENCOUNTER — APPOINTMENT (OUTPATIENT)
Dept: GENERAL RADIOLOGY | Age: 64
End: 2024-07-03
Payer: COMMERCIAL

## 2024-07-03 ENCOUNTER — HOSPITAL ENCOUNTER (EMERGENCY)
Age: 64
Discharge: HOME | End: 2024-07-03
Attending: EMERGENCY MEDICINE
Payer: COMMERCIAL

## 2024-07-03 VITALS
TEMPERATURE: 98.4 F | WEIGHT: 261 LBS | DIASTOLIC BLOOD PRESSURE: 94 MMHG | HEIGHT: 72 IN | RESPIRATION RATE: 16 BRPM | BODY MASS INDEX: 35.35 KG/M2 | SYSTOLIC BLOOD PRESSURE: 162 MMHG | OXYGEN SATURATION: 97 % | HEART RATE: 99 BPM

## 2024-07-03 DIAGNOSIS — S43.005A SHOULDER DISLOCATION, LEFT, INITIAL ENCOUNTER: Primary | ICD-10-CM

## 2024-07-03 DIAGNOSIS — I48.20 CHRONIC ATRIAL FIBRILLATION (HCC): ICD-10-CM

## 2024-07-03 DIAGNOSIS — I48.91 ATRIAL FIBRILLATION WITH RVR (HCC): ICD-10-CM

## 2024-07-03 LAB
ALBUMIN SERPL-MCNC: 4.3 G/DL (ref 3.5–5.2)
ALP SERPL-CCNC: 122 U/L (ref 40–129)
ALT SERPL-CCNC: 13 U/L (ref 0–40)
ANION GAP SERPL CALCULATED.3IONS-SCNC: 11 MMOL/L (ref 7–16)
AST SERPL-CCNC: 14 U/L (ref 0–39)
BASOPHILS # BLD: 0.06 K/UL (ref 0–0.2)
BASOPHILS NFR BLD: 0 % (ref 0–2)
BILIRUB SERPL-MCNC: 0.6 MG/DL (ref 0–1.2)
BUN SERPL-MCNC: 13 MG/DL (ref 6–23)
CALCIUM SERPL-MCNC: 9.2 MG/DL (ref 8.6–10.2)
CHLORIDE SERPL-SCNC: 99 MMOL/L (ref 98–107)
CO2 SERPL-SCNC: 26 MMOL/L (ref 22–29)
CREAT SERPL-MCNC: 0.9 MG/DL (ref 0.7–1.2)
EKG ATRIAL RATE: 326 BPM
EKG Q-T INTERVAL: 304 MS
EKG QRS DURATION: 78 MS
EKG QTC CALCULATION (BAZETT): 462 MS
EKG R AXIS: -22 DEGREES
EKG T AXIS: 120 DEGREES
EKG VENTRICULAR RATE: 139 BPM
EOSINOPHIL # BLD: 0.09 K/UL (ref 0.05–0.5)
EOSINOPHILS RELATIVE PERCENT: 1 % (ref 0–6)
ERYTHROCYTE [DISTWIDTH] IN BLOOD BY AUTOMATED COUNT: 13.7 % (ref 11.5–15)
GFR, ESTIMATED: >90 ML/MIN/1.73M2
GLUCOSE SERPL-MCNC: 166 MG/DL (ref 74–99)
HCT VFR BLD AUTO: 39.8 % (ref 37–54)
HGB BLD-MCNC: 13.6 G/DL (ref 12.5–16.5)
IMM GRANULOCYTES # BLD AUTO: 0.13 K/UL (ref 0–0.58)
IMM GRANULOCYTES NFR BLD: 1 % (ref 0–5)
LYMPHOCYTES NFR BLD: 2.38 K/UL (ref 1.5–4)
LYMPHOCYTES RELATIVE PERCENT: 18 % (ref 20–42)
MCH RBC QN AUTO: 34.4 PG (ref 26–35)
MCHC RBC AUTO-ENTMCNC: 34.2 G/DL (ref 32–34.5)
MCV RBC AUTO: 100.8 FL (ref 80–99.9)
MONOCYTES NFR BLD: 1.06 K/UL (ref 0.1–0.95)
MONOCYTES NFR BLD: 8 % (ref 2–12)
NEUTROPHILS NFR BLD: 72 % (ref 43–80)
NEUTS SEG NFR BLD: 9.78 K/UL (ref 1.8–7.3)
PLATELET # BLD AUTO: 167 K/UL (ref 130–450)
PMV BLD AUTO: 10.7 FL (ref 7–12)
POTASSIUM SERPL-SCNC: 4.5 MMOL/L (ref 3.5–5)
PROT SERPL-MCNC: 7.4 G/DL (ref 6.4–8.3)
RBC # BLD AUTO: 3.95 M/UL (ref 3.8–5.8)
SODIUM SERPL-SCNC: 136 MMOL/L (ref 132–146)
TROPONIN I SERPL HS-MCNC: 20 NG/L (ref 0–11)
TROPONIN I SERPL HS-MCNC: 27 NG/L (ref 0–11)
WBC OTHER # BLD: 13.5 K/UL (ref 4.5–11.5)

## 2024-07-03 PROCEDURE — 73020 X-RAY EXAM OF SHOULDER: CPT

## 2024-07-03 PROCEDURE — 70450 CT HEAD/BRAIN W/O DYE: CPT

## 2024-07-03 PROCEDURE — 23655 CLTX SHO DSLC W/MNPJ W/ANES: CPT

## 2024-07-03 PROCEDURE — 96376 TX/PRO/DX INJ SAME DRUG ADON: CPT

## 2024-07-03 PROCEDURE — 93010 ELECTROCARDIOGRAM REPORT: CPT | Performed by: INTERNAL MEDICINE

## 2024-07-03 PROCEDURE — 84484 ASSAY OF TROPONIN QUANT: CPT

## 2024-07-03 PROCEDURE — 6370000000 HC RX 637 (ALT 250 FOR IP)

## 2024-07-03 PROCEDURE — 6360000002 HC RX W HCPCS

## 2024-07-03 PROCEDURE — 96375 TX/PRO/DX INJ NEW DRUG ADDON: CPT

## 2024-07-03 PROCEDURE — 93005 ELECTROCARDIOGRAM TRACING: CPT

## 2024-07-03 PROCEDURE — 2500000003 HC RX 250 WO HCPCS

## 2024-07-03 PROCEDURE — 73030 X-RAY EXAM OF SHOULDER: CPT

## 2024-07-03 PROCEDURE — 96374 THER/PROPH/DIAG INJ IV PUSH: CPT

## 2024-07-03 PROCEDURE — 99285 EMERGENCY DEPT VISIT HI MDM: CPT

## 2024-07-03 PROCEDURE — 80053 COMPREHEN METABOLIC PANEL: CPT

## 2024-07-03 PROCEDURE — 2580000003 HC RX 258

## 2024-07-03 PROCEDURE — 85025 COMPLETE CBC W/AUTO DIFF WBC: CPT

## 2024-07-03 RX ORDER — PROPOFOL 10 MG/ML
INJECTION, EMULSION INTRAVENOUS
Status: COMPLETED
Start: 2024-07-03 | End: 2024-07-03

## 2024-07-03 RX ORDER — METOPROLOL TARTRATE 1 MG/ML
10 INJECTION, SOLUTION INTRAVENOUS ONCE
Status: COMPLETED | OUTPATIENT
Start: 2024-07-03 | End: 2024-07-03

## 2024-07-03 RX ORDER — METOPROLOL TARTRATE 1 MG/ML
5 INJECTION, SOLUTION INTRAVENOUS ONCE
Status: COMPLETED | OUTPATIENT
Start: 2024-07-03 | End: 2024-07-03

## 2024-07-03 RX ORDER — PROPOFOL 10 MG/ML
100 INJECTION, EMULSION INTRAVENOUS ONCE
Status: COMPLETED | OUTPATIENT
Start: 2024-07-03 | End: 2024-07-03

## 2024-07-03 RX ORDER — METOPROLOL SUCCINATE 25 MG/1
25 TABLET, EXTENDED RELEASE ORAL ONCE
Status: COMPLETED | OUTPATIENT
Start: 2024-07-03 | End: 2024-07-03

## 2024-07-03 RX ORDER — METOPROLOL TARTRATE 1 MG/ML
5 INJECTION, SOLUTION INTRAVENOUS ONCE
Status: DISCONTINUED | OUTPATIENT
Start: 2024-07-03 | End: 2024-07-03

## 2024-07-03 RX ORDER — 0.9 % SODIUM CHLORIDE 0.9 %
1000 INTRAVENOUS SOLUTION INTRAVENOUS ONCE
Status: COMPLETED | OUTPATIENT
Start: 2024-07-03 | End: 2024-07-03

## 2024-07-03 RX ORDER — FENTANYL CITRATE 0.05 MG/ML
50 INJECTION, SOLUTION INTRAMUSCULAR; INTRAVENOUS ONCE
Status: COMPLETED | OUTPATIENT
Start: 2024-07-03 | End: 2024-07-03

## 2024-07-03 RX ADMIN — METOPROLOL TARTRATE 10 MG: 1 INJECTION, SOLUTION INTRAVENOUS at 14:10

## 2024-07-03 RX ADMIN — FENTANYL CITRATE 50 MCG: 50 INJECTION INTRAMUSCULAR; INTRAVENOUS at 07:41

## 2024-07-03 RX ADMIN — PROPOFOL 100 MG: 10 INJECTION, EMULSION INTRAVENOUS at 09:25

## 2024-07-03 RX ADMIN — METOPROLOL TARTRATE 5 MG: 1 INJECTION, SOLUTION INTRAVENOUS at 13:14

## 2024-07-03 RX ADMIN — METOPROLOL SUCCINATE 25 MG: 25 TABLET, EXTENDED RELEASE ORAL at 10:40

## 2024-07-03 RX ADMIN — SODIUM CHLORIDE 1000 ML: 9 INJECTION, SOLUTION INTRAVENOUS at 09:43

## 2024-07-03 RX ADMIN — METOPROLOL TARTRATE 5 MG: 1 INJECTION, SOLUTION INTRAVENOUS at 11:48

## 2024-07-03 RX ADMIN — FENTANYL CITRATE 50 MCG: 50 INJECTION INTRAMUSCULAR; INTRAVENOUS at 13:14

## 2024-07-03 ASSESSMENT — PAIN DESCRIPTION - ORIENTATION
ORIENTATION: LEFT
ORIENTATION: LEFT

## 2024-07-03 ASSESSMENT — PAIN SCALES - GENERAL
PAINLEVEL_OUTOF10: 8
PAINLEVEL_OUTOF10: 10

## 2024-07-03 ASSESSMENT — PAIN DESCRIPTION - LOCATION
LOCATION: SHOULDER
LOCATION: SHOULDER

## 2024-07-03 ASSESSMENT — PAIN DESCRIPTION - DESCRIPTORS: DESCRIPTORS: ACHING;DISCOMFORT;DULL;THROBBING

## 2024-07-03 NOTE — ED PROVIDER NOTES
R-0DC to SNF      hydrOXYzine pamoate (VISTARIL) 50 MG capsule Take 1 capsule by mouth nightly as needed (sleep), Disp-30 capsule, R-07/17/23: DISREGARD PREVIOUS PRESCRIPTIONS AND REFILLS; HONOR THIS PRESCRIPTION AND REFILLSNormal      albuterol sulfate HFA (PROAIR HFA) 108 (90 Base) MCG/ACT inhaler Inhale 2 puffs into the lungs every 6 hours as needed for Wheezing, Disp-1 each, R-117/17/23: DISREGARD PREVIOUS PRESCRIPTIONS AND REFILLS; HONOR THIS PRESCRIPTION AND REFILLS. Patient reports suboptimal control from Breo, Dulera, Advair.  Furthermore,  none of the aforementioned inhalers are covered by insuranceNormal      mometasone-formoterol (DULERA) 200-5 MCG/ACT inhaler Inhale 2 puffs into the lungs in the morning and 2 puffs in the evening., Disp-1 each, R-117/17/23: DISREGARD PREVIOUS PRESCRIPTIONS AND REFILLS; HONOR THIS PRESCRIPTION AND REFILLSNormal      apixaban (ELIQUIS) 5 MG TABS tablet Take 1 tablet by mouth 2 times daily, Disp-60 tablet, R-117/17/23: DISREGARD PREVIOUS PRESCRIPTIONS AND REFILLS; HONOR THIS PRESCRIPTION AND REFILLSNormal      traZODone (DESYREL) 100 MG tablet Take 1 tablet by mouth nightly as needed for Sleep, Disp-30 tablet, R-07/17/23: DISREGARD PREVIOUS PRESCRIPTIONS AND REFILLS; HONOR THIS PRESCRIPTION AND REFILLSNormal      atorvastatin (LIPITOR) 40 MG tablet Take 1 tablet by mouth nightly, Disp-30 tablet, R-07/17/23: DISREGARD PREVIOUS PRESCRIPTIONS AND REFILLS; HONOR THIS PRESCRIPTION AND REFILLSNormal      QUEtiapine (SEROQUEL) 25 MG tablet Take 2 tablets by mouth nightly, Disp-60 tablet, R-07/17/23: DISREGARD PREVIOUS PRESCRIPTIONS AND REFILLS; HONOR THIS PRESCRIPTION AND REFILLSNormal      metoprolol succinate (TOPROL XL) 25 MG extended release tablet Take 1 tablet by mouth in the morning and at bedtime, Disp-30 tablet, R-07/17/23: DISREGARD PREVIOUS PRESCRIPTIONS AND REFILLS; HONOR THIS PRESCRIPTION AND REFILLSNormal      fluticasone (FLONASE) 50 MCG/ACT nasal spray 2 sprays by

## 2024-07-03 NOTE — CARE COORDINATION
SS Note: Pt here from home for Shoulder pain. Pt has dx of HTN. SW identified pt as potential for Remote Patient Monitoring (RPM) program. MIRIAN notified [facility] Mgr that pt is potential candidate for Remote Patient Monitoring (RPM) program. After further review it was noted [facility] SW spoke to pt who notes he has been living @ [facility] since January this year. He wants to switch to another PCP but indicates the facility encourages you to use their Physician- as it is easier to get med. SW did provide him w/PCP list. SW did leave  @ [place] to see who current PCP is.   Pt was d/c'd.  Electronically signed by AVERY Espinal on 7/3/2024 at 3:01 PM

## 2025-03-17 ENCOUNTER — HOSPITAL ENCOUNTER (OUTPATIENT)
Age: 65
Discharge: HOME | End: 2025-03-17
Payer: COMMERCIAL

## 2025-03-17 ENCOUNTER — HOSPITAL ENCOUNTER (OUTPATIENT)
Dept: MRI IMAGING | Age: 65
Discharge: HOME | End: 2025-03-19
Payer: COMMERCIAL

## 2025-03-17 DIAGNOSIS — H93.13 TINNITUS, BILATERAL: ICD-10-CM

## 2025-03-17 DIAGNOSIS — H90.3 SENSORINEURAL HEARING LOSS (SNHL), BILATERAL: ICD-10-CM

## 2025-03-17 LAB
BUN SERPL-MCNC: 16 MG/DL (ref 6–23)
CREAT SERPL-MCNC: 1.1 MG/DL (ref 0.7–1.2)
GFR, ESTIMATED: 77 ML/MIN/1.73M2

## 2025-03-17 PROCEDURE — 36415 COLL VENOUS BLD VENIPUNCTURE: CPT

## 2025-03-17 PROCEDURE — 84520 ASSAY OF UREA NITROGEN: CPT

## 2025-03-17 PROCEDURE — 82565 ASSAY OF CREATININE: CPT

## 2025-03-17 PROCEDURE — A9577 INJ MULTIHANCE: HCPCS | Performed by: RADIOLOGY

## 2025-03-17 PROCEDURE — 6360000004 HC RX CONTRAST MEDICATION: Performed by: RADIOLOGY

## 2025-03-17 PROCEDURE — 70553 MRI BRAIN STEM W/O & W/DYE: CPT

## 2025-03-17 RX ADMIN — GADOBENATE DIMEGLUMINE 20 ML: 529 INJECTION, SOLUTION INTRAVENOUS at 09:40

## 2025-08-29 ENCOUNTER — OFFICE VISIT (OUTPATIENT)
Age: 65
End: 2025-08-29

## 2025-08-29 VITALS — WEIGHT: 280 LBS | BODY MASS INDEX: 37.93 KG/M2 | HEIGHT: 72 IN

## 2025-08-29 DIAGNOSIS — S43.015D ANTERIOR DISLOCATION OF LEFT SHOULDER, SUBSEQUENT ENCOUNTER: Primary | ICD-10-CM

## 2025-08-29 RX ORDER — NALTREXONE HYDROCHLORIDE 50 MG/1
TABLET, FILM COATED ORAL
COMMUNITY
Start: 2025-08-20

## 2025-08-29 RX ORDER — CHOLECALCIFEROL (VITAMIN D3) 1250 MCG
CAPSULE ORAL
COMMUNITY
Start: 2025-06-07

## 2025-08-29 RX ORDER — PREGABALIN 100 MG/1
CAPSULE ORAL
COMMUNITY
Start: 2025-08-09

## 2025-08-29 RX ORDER — INSULIN LISPRO 100 [IU]/ML
INJECTION, SOLUTION INTRAVENOUS; SUBCUTANEOUS
COMMUNITY
Start: 2024-05-01

## 2025-08-29 RX ORDER — DAPAGLIFLOZIN 5 MG/1
1 TABLET, FILM COATED ORAL
COMMUNITY

## 2025-08-29 RX ORDER — PREGABALIN 50 MG/1
CAPSULE ORAL
COMMUNITY
Start: 2025-08-01

## 2025-08-29 RX ORDER — CHLORAL HYDRATE 500 MG
2 CAPSULE ORAL
COMMUNITY
Start: 2024-12-05

## 2025-08-29 RX ORDER — LANOLIN ALCOHOL/MO/W.PET/CERES
CREAM (GRAM) TOPICAL
COMMUNITY
Start: 2025-07-10

## 2025-08-29 RX ORDER — DULAGLUTIDE 1.5 MG/.5ML
INJECTION, SOLUTION SUBCUTANEOUS
COMMUNITY
Start: 2024-12-05

## 2025-08-29 RX ORDER — LOSARTAN POTASSIUM 25 MG/1
1 TABLET ORAL
COMMUNITY

## 2025-08-29 RX ORDER — EZETIMIBE 10 MG/1
1 TABLET ORAL
COMMUNITY

## (undated) DEVICE — PATIENT RETURN ELECTRODE, SINGLE-USE, CONTACT QUALITY MONITORING, ADULT, WITH 9FT CORD, FOR PATIENTS WEIGING OVER 33LBS. (15KG): Brand: MEGADYNE

## (undated) DEVICE — NDL CNTR 40CT FM MAG: Brand: MEDLINE INDUSTRIES, INC.

## (undated) DEVICE — TUBING, SUCTION, 1/4" X 10', STRAIGHT: Brand: MEDLINE

## (undated) DEVICE — TOWEL,OR,DSP,ST,BLUE,STD,6/PK,12PK/CS: Brand: MEDLINE

## (undated) DEVICE — NEEDLE HYPO 25GA L1.5IN BLU POLYPR HUB S STL REG BVL STR

## (undated) DEVICE — BLADE ES ELASTOMERIC COAT INSUL DURABLE BEND UPTO 90DEG

## (undated) DEVICE — DOUBLE BASIN SET: Brand: MEDLINE INDUSTRIES, INC.

## (undated) DEVICE — SET SURG INSTR DISSECT

## (undated) DEVICE — YANKAUER,BULB TIP,W/O VENT,RIGID,STERILE: Brand: MEDLINE

## (undated) DEVICE — MARKER,SKIN,WI/RULER AND LABELS: Brand: MEDLINE

## (undated) DEVICE — CHLORAPREP 26ML ORANGE

## (undated) DEVICE — SYRINGE IRRIG 60ML SFT PLIABLE BLB EZ TO GRP 1 HND USE W/

## (undated) DEVICE — SYRINGE MED 10ML TRNSLUC BRL PLUNG BLK MRK POLYPR CTRL

## (undated) DEVICE — GOWN,SIRUS,NONRNF,SETINSLV,XL,20/CS: Brand: MEDLINE

## (undated) DEVICE — GLOVE ORANGE PI 7 1/2   MSG9075

## (undated) DEVICE — PACK,LAPAROTOMY,NO GOWNS: Brand: MEDLINE

## (undated) DEVICE — ELECTROSURGICAL PENCIL BUTTON SWITCH E-Z CLEAN COATED BLADE ELECTRODE 10 FT (3 M) CORD HOLSTER: Brand: MEGADYNE

## (undated) DEVICE — PAD,NON-ADHERENT,3X8,STERILE,LF,1/PK: Brand: MEDLINE

## (undated) DEVICE — GOWN,SIRUS,FABRNF,XL,20/CS: Brand: MEDLINE

## (undated) DEVICE — GAUZE,SPONGE,4"X4",16PLY,STRL,LF,10/TRAY: Brand: MEDLINE

## (undated) DEVICE — GAUZE,SPONGE,4"X4",16PLY,XRAY,STRL,LF: Brand: MEDLINE

## (undated) DEVICE — COVER,LIGHT HANDLE,FLX,1/PK: Brand: MEDLINE INDUSTRIES, INC.

## (undated) DEVICE — INTENDED FOR TISSUE SEPARATION, AND OTHER PROCEDURES THAT REQUIRE A SHARP SURGICAL BLADE TO PUNCTURE OR CUT.: Brand: BARD-PARKER ® STAINLESS STEEL BLADES